# Patient Record
Sex: MALE | Race: OTHER | Employment: OTHER | URBAN - METROPOLITAN AREA
[De-identification: names, ages, dates, MRNs, and addresses within clinical notes are randomized per-mention and may not be internally consistent; named-entity substitution may affect disease eponyms.]

---

## 2019-08-02 ENCOUNTER — HOSPITAL ENCOUNTER (OUTPATIENT)
Dept: INFUSION THERAPY | Age: 64
Discharge: HOME | End: 2019-08-02
Payer: OTHER GOVERNMENT

## 2019-08-02 VITALS
DIASTOLIC BLOOD PRESSURE: 90 MMHG | SYSTOLIC BLOOD PRESSURE: 150 MMHG | TEMPERATURE: 97.9 F | HEART RATE: 88 BPM | RESPIRATION RATE: 16 BRPM

## 2019-08-02 PROCEDURE — 74011250636 HC RX REV CODE- 250/636: Performed by: NURSE PRACTITIONER

## 2019-08-02 PROCEDURE — 96365 THER/PROPH/DIAG IV INF INIT: CPT

## 2019-08-02 PROCEDURE — 96366 THER/PROPH/DIAG IV INF ADDON: CPT

## 2019-08-02 PROCEDURE — 74011250636 HC RX REV CODE- 250/636

## 2019-08-02 RX ORDER — ALLOPURINOL 100 MG/1
TABLET ORAL DAILY
COMMUNITY

## 2019-08-02 RX ORDER — ASPIRIN 81 MG/1
TABLET ORAL DAILY
COMMUNITY

## 2019-08-02 RX ORDER — SODIUM CHLORIDE 0.9 % (FLUSH) 0.9 %
5-10 SYRINGE (ML) INJECTION AS NEEDED
Status: DISCONTINUED | OUTPATIENT
Start: 2019-08-02 | End: 2019-08-03 | Stop reason: HOSPADM

## 2019-08-02 RX ORDER — HEPARIN 100 UNIT/ML
200 SYRINGE INTRAVENOUS AS NEEDED
Status: DISCONTINUED | OUTPATIENT
Start: 2019-08-02 | End: 2019-08-03 | Stop reason: HOSPADM

## 2019-08-02 RX ORDER — URSODIOL 300 MG/1
300 CAPSULE ORAL 2 TIMES DAILY
COMMUNITY
End: 2019-08-02

## 2019-08-02 RX ADMIN — Medication 10 ML: at 10:20

## 2019-08-02 RX ADMIN — Medication 10 ML: at 10:19

## 2019-08-02 RX ADMIN — VANCOMYCIN HYDROCHLORIDE 1750 MG: 1 INJECTION, POWDER, LYOPHILIZED, FOR SOLUTION INTRAVENOUS at 11:08

## 2019-08-02 RX ADMIN — Medication 200 UNITS: at 13:17

## 2019-08-02 NOTE — PROGRESS NOTES
[address] at [address]    8692 Patient arrives for Daily Antibiotics without acute problems. Please see connect care for complete assessment and education provided. All central lines follow the [facility]. Vital signs stable throughout and prior to discharge, Pt. Tolerated treatment well and discharged without incident. Patient is aware of next [facility] appointment on 8/2/2019. Medications Verified by Kely Nunes RN & Abigail Reyna RN RN via [employer]:  1. Vancomycin 1750mg  2.  Heparin flush X2    VITAL SIGNS   Patient Vitals for the past 12 hrs:   Temp Pulse Resp BP   08/02/19 1319 97.9 °F (36.6 °C) 88 16 150/90   08/02/19 1020 97.5 °F (36.4 °C) 87 16 141/90

## 2019-08-03 ENCOUNTER — HOSPITAL ENCOUNTER (OUTPATIENT)
Dept: INFUSION THERAPY | Age: 64
Discharge: HOME | End: 2019-08-03
Payer: OTHER GOVERNMENT

## 2019-08-03 VITALS
RESPIRATION RATE: 18 BRPM | HEART RATE: 76 BPM | DIASTOLIC BLOOD PRESSURE: 85 MMHG | SYSTOLIC BLOOD PRESSURE: 127 MMHG | TEMPERATURE: 97.6 F

## 2019-08-03 PROCEDURE — 74011250636 HC RX REV CODE- 250/636

## 2019-08-03 PROCEDURE — 74011250636 HC RX REV CODE- 250/636: Performed by: NURSE PRACTITIONER

## 2019-08-03 PROCEDURE — 96366 THER/PROPH/DIAG IV INF ADDON: CPT

## 2019-08-03 PROCEDURE — 96365 THER/PROPH/DIAG IV INF INIT: CPT

## 2019-08-03 RX ORDER — SODIUM CHLORIDE 0.9 % (FLUSH) 0.9 %
5-10 SYRINGE (ML) INJECTION AS NEEDED
Status: DISCONTINUED | OUTPATIENT
Start: 2019-08-03 | End: 2019-08-04 | Stop reason: HOSPADM

## 2019-08-03 RX ORDER — HEPARIN 100 UNIT/ML
500 SYRINGE INTRAVENOUS AS NEEDED
Status: DISCONTINUED | OUTPATIENT
Start: 2019-08-03 | End: 2019-08-04 | Stop reason: HOSPADM

## 2019-08-03 RX ADMIN — VANCOMYCIN HYDROCHLORIDE 1750 MG: 1 INJECTION, POWDER, LYOPHILIZED, FOR SOLUTION INTRAVENOUS at 08:30

## 2019-08-03 RX ADMIN — Medication 10 ML: at 08:29

## 2019-08-03 RX ADMIN — Medication 500 UNITS: at 10:33

## 2019-08-03 RX ADMIN — Medication 10 ML: at 10:33

## 2019-08-03 NOTE — PROGRESS NOTES
Outpatient Infusion Center Progress Note    0800 Pt admit to [facility] for daily antibiotics ambulatory in stable condition. Assessment completed. No new concerns voiced. Double lumen PICC CDI, flushed with positive blood return. Visit Vitals  /85   Pulse 76   Temp 97.6 °F (36.4 °C)   Resp 18       Medications:  Medications Administered     heparin (porcine) pf 500 Units     Admin Date  08/03/2019 Action  Given Dose  500 Units Route  IntraVENous Administered By  Lindsey Gil RN          sodium chloride (NS) flush 5-10 mL     Admin Date  08/03/2019 Action  Given Dose  10 mL Route  IntraVENous Administered By  Lindsey Gil RN           Admin Date  08/03/2019 Action  Given Dose  10 mL Route  IntraVENous Administered By  Lindsey Gil RN          vancomycin (VANCOCIN) 1,750 mg in 0.9% sodium chloride 500 mL IVPB     Admin Date  08/03/2019 Action  New Bag Dose  1750 mg Rate  250 mL/hr Route  IntraVENous Administered By  Lindsey Gil, GABRIEL                1030 Pt tolerated treatment well. D/c home ambulatory in no distress. Pt aware of next appointment scheduled for 8/4/19.

## 2019-08-04 ENCOUNTER — HOSPITAL ENCOUNTER (OUTPATIENT)
Dept: INFUSION THERAPY | Age: 64
Discharge: HOME | End: 2019-08-04
Payer: OTHER GOVERNMENT

## 2019-08-04 VITALS
RESPIRATION RATE: 18 BRPM | HEART RATE: 74 BPM | DIASTOLIC BLOOD PRESSURE: 87 MMHG | TEMPERATURE: 97.8 F | SYSTOLIC BLOOD PRESSURE: 127 MMHG

## 2019-08-04 PROCEDURE — 74011250636 HC RX REV CODE- 250/636: Performed by: INTERNAL MEDICINE

## 2019-08-04 PROCEDURE — 96365 THER/PROPH/DIAG IV INF INIT: CPT

## 2019-08-04 PROCEDURE — 96366 THER/PROPH/DIAG IV INF ADDON: CPT

## 2019-08-04 PROCEDURE — 74011250636 HC RX REV CODE- 250/636

## 2019-08-04 RX ORDER — HEPARIN 100 UNIT/ML
500 SYRINGE INTRAVENOUS AS NEEDED
Status: DISCONTINUED | OUTPATIENT
Start: 2019-08-04 | End: 2019-08-05 | Stop reason: HOSPADM

## 2019-08-04 RX ORDER — SODIUM CHLORIDE 0.9 % (FLUSH) 0.9 %
5-10 SYRINGE (ML) INJECTION AS NEEDED
Status: DISCONTINUED | OUTPATIENT
Start: 2019-08-04 | End: 2019-08-05 | Stop reason: HOSPADM

## 2019-08-04 RX ADMIN — Medication 10 ML: at 08:22

## 2019-08-04 RX ADMIN — Medication 10 ML: at 08:26

## 2019-08-04 RX ADMIN — VANCOMYCIN HYDROCHLORIDE 1750 MG: 1 INJECTION, POWDER, LYOPHILIZED, FOR SOLUTION INTRAVENOUS at 08:18

## 2019-08-04 RX ADMIN — Medication 500 UNITS: at 08:22

## 2019-08-04 RX ADMIN — Medication 500 UNITS: at 08:26

## 2019-08-04 NOTE — PROGRESS NOTES
[facility] Outpatient Infusion Center Note:  8894AL arrived at [facility] ambulatory and in no distress for daily antibiotic    Assessment stable, no new complaints voiced. Medications received:  Vancomycin     Tolerated treatment well, no adverse reaction noted. D/Cd from [facility] ambulatory and in no distress accompanied by self. Next appt 8/5  Visit Vitals  /87   Pulse 74   Temp 97.8 °F (36.6 °C)   Resp 18     No results found for this or any previous visit (from the past 12 hour(s)).

## 2019-08-05 ENCOUNTER — HOSPITAL ENCOUNTER (OUTPATIENT)
Dept: INFUSION THERAPY | Age: 64
Discharge: HOME | End: 2019-08-05
Payer: OTHER GOVERNMENT

## 2019-08-05 VITALS
TEMPERATURE: 98.1 F | SYSTOLIC BLOOD PRESSURE: 125 MMHG | DIASTOLIC BLOOD PRESSURE: 84 MMHG | RESPIRATION RATE: 18 BRPM | HEART RATE: 78 BPM

## 2019-08-05 LAB
ANION GAP SERPL CALC-SCNC: 9 MMOL/L (ref 5–15)
BASOPHILS # BLD: 0.1 K/UL (ref 0–0.1)
BASOPHILS NFR BLD: 1 % (ref 0–1)
BUN SERPL-MCNC: 21 MG/DL (ref 6–20)
BUN/CREAT SERPL: 14 (ref 12–20)
CALCIUM SERPL-MCNC: 9.2 MG/DL (ref 8.5–10.1)
CHLORIDE SERPL-SCNC: 108 MMOL/L (ref 97–108)
CO2 SERPL-SCNC: 26 MMOL/L (ref 21–32)
CREAT SERPL-MCNC: 1.54 MG/DL (ref 0.7–1.3)
DATE LAST DOSE: ABNORMAL
DIFFERENTIAL METHOD BLD: ABNORMAL
EOSINOPHIL # BLD: 0.4 K/UL (ref 0–0.4)
EOSINOPHIL NFR BLD: 4 % (ref 0–7)
ERYTHROCYTE [DISTWIDTH] IN BLOOD BY AUTOMATED COUNT: 13.7 % (ref 11.5–14.5)
GLUCOSE SERPL-MCNC: 64 MG/DL (ref 65–100)
HCT VFR BLD AUTO: 43 % (ref 36.6–50.3)
HGB BLD-MCNC: 13.6 G/DL (ref 12.1–17)
IMM GRANULOCYTES # BLD AUTO: 0.2 K/UL (ref 0–0.04)
IMM GRANULOCYTES NFR BLD AUTO: 2 % (ref 0–0.5)
LYMPHOCYTES # BLD: 1.9 K/UL (ref 0.8–3.5)
LYMPHOCYTES NFR BLD: 18 % (ref 12–49)
MCH RBC QN AUTO: 28 PG (ref 26–34)
MCHC RBC AUTO-ENTMCNC: 31.6 G/DL (ref 30–36.5)
MCV RBC AUTO: 88.5 FL (ref 80–99)
MONOCYTES # BLD: 1.1 K/UL (ref 0–1)
MONOCYTES NFR BLD: 11 % (ref 5–13)
NEUTS SEG # BLD: 6.8 K/UL (ref 1.8–8)
NEUTS SEG NFR BLD: 64 % (ref 32–75)
NRBC # BLD: 0 K/UL (ref 0–0.01)
NRBC BLD-RTO: 0 PER 100 WBC
PLATELET # BLD AUTO: 331 K/UL (ref 150–400)
PMV BLD AUTO: 10.2 FL (ref 8.9–12.9)
POTASSIUM SERPL-SCNC: 4.5 MMOL/L (ref 3.5–5.1)
RBC # BLD AUTO: 4.86 M/UL (ref 4.1–5.7)
REPORTED DOSE,DOSE: ABNORMAL UNITS
REPORTED DOSE/TIME,TMG: ABNORMAL
SODIUM SERPL-SCNC: 143 MMOL/L (ref 136–145)
VANCOMYCIN TROUGH SERPL-MCNC: 10.5 UG/ML (ref 5–10)
WBC # BLD AUTO: 10.3 K/UL (ref 4.1–11.1)

## 2019-08-05 PROCEDURE — 96365 THER/PROPH/DIAG IV INF INIT: CPT

## 2019-08-05 PROCEDURE — 80202 ASSAY OF VANCOMYCIN: CPT

## 2019-08-05 PROCEDURE — 85025 COMPLETE CBC W/AUTO DIFF WBC: CPT

## 2019-08-05 PROCEDURE — 74011000258 HC RX REV CODE- 258

## 2019-08-05 PROCEDURE — 74011250636 HC RX REV CODE- 250/636: Performed by: NURSE PRACTITIONER

## 2019-08-05 PROCEDURE — 74011250636 HC RX REV CODE- 250/636

## 2019-08-05 PROCEDURE — 96366 THER/PROPH/DIAG IV INF ADDON: CPT

## 2019-08-05 PROCEDURE — 36415 COLL VENOUS BLD VENIPUNCTURE: CPT

## 2019-08-05 PROCEDURE — 80048 BASIC METABOLIC PNL TOTAL CA: CPT

## 2019-08-05 RX ORDER — SODIUM CHLORIDE 0.9 % (FLUSH) 0.9 %
5-10 SYRINGE (ML) INJECTION AS NEEDED
Status: DISCONTINUED | OUTPATIENT
Start: 2019-08-05 | End: 2019-08-06 | Stop reason: HOSPADM

## 2019-08-05 RX ORDER — SODIUM CHLORIDE 9 MG/ML
25 INJECTION, SOLUTION INTRAVENOUS CONTINUOUS
Status: DISCONTINUED | OUTPATIENT
Start: 2019-08-05 | End: 2019-08-06 | Stop reason: HOSPADM

## 2019-08-05 RX ORDER — HEPARIN 100 UNIT/ML
500 SYRINGE INTRAVENOUS AS NEEDED
Status: DISCONTINUED | OUTPATIENT
Start: 2019-08-05 | End: 2019-08-06 | Stop reason: HOSPADM

## 2019-08-05 RX ADMIN — Medication 500 UNITS: at 15:25

## 2019-08-05 RX ADMIN — SODIUM CHLORIDE 25 ML/HR: 900 INJECTION, SOLUTION INTRAVENOUS at 15:25

## 2019-08-05 RX ADMIN — Medication 500 UNITS: at 18:04

## 2019-08-05 RX ADMIN — Medication 10 ML: at 18:04

## 2019-08-05 RX ADMIN — VANCOMYCIN HYDROCHLORIDE 1750 MG: 1 INJECTION, POWDER, LYOPHILIZED, FOR SOLUTION INTRAVENOUS at 15:59

## 2019-08-05 RX ADMIN — Medication 10 ML: at 15:25

## 2019-08-05 NOTE — PROGRESS NOTES
[facility] Short Note                       Date: 2019    Name: Misael Hart    MRN: [id number]         : 1955      1515 Pt admit to [facility] for daily Vancomycin. Pt ambulatory in stable condition. Assessment completed. No new concerns voiced. Please review further pending lab results in [address] [place] [address]. Mr. Hu Arellano vitals were reviewed prior to and after treatment. Patient Vitals for the past 12 hrs:   Temp Pulse Resp BP   19 1520 98.1 °F (36.7 °C) 78 18 125/84       Lab results were obtained and reviewed. Recent Results (from the past 12 hour(s))   CBC WITH AUTOMATED DIFF    Collection Time: 19  3:20 PM   Result Value Ref Range    WBC 10.3 4.1 - 11.1 K/uL    RBC 4.86 4.10 - 5.70 M/uL    HGB 13.6 12.1 - 17.0 g/dL    HCT 43.0 36.6 - 50.3 %    MCV 88.5 80.0 - 99.0 FL    MCH 28.0 26.0 - 34.0 PG    MCHC 31.6 30.0 - 36.5 g/dL    RDW 13.7 11.5 - 14.5 %    PLATELET 283 864 - 818 K/uL    MPV 10.2 8.9 - 12.9 FL    NRBC 0.0 0  WBC    ABSOLUTE NRBC 0.00 0.00 - 0.01 K/uL    NEUTROPHILS 64 32 - 75 %    LYMPHOCYTES 18 12 - 49 %    MONOCYTES 11 5 - 13 %    EOSINOPHILS 4 0 - 7 %    BASOPHILS 1 0 - 1 %    IMMATURE GRANULOCYTES 2 (H) 0.0 - 0.5 %    ABS. NEUTROPHILS 6.8 1.8 - 8.0 K/UL    ABS. LYMPHOCYTES 1.9 0.8 - 3.5 K/UL    ABS. MONOCYTES 1.1 (H) 0.0 - 1.0 K/UL    ABS. EOSINOPHILS 0.4 0.0 - 0.4 K/UL    ABS. BASOPHILS 0.1 0.0 - 0.1 K/UL    ABS. IMM.  GRANS. 0.2 (H) 0.00 - 0.04 K/UL    DF AUTOMATED         Medications given:   Medications Administered     0.9% sodium chloride infusion     Admin Date  2019 Action  New Bag Dose  25 mL/hr Rate  25 mL/hr Route  IntraVENous Administered By  Beth Shell RN          heparin (porcine) pf 500 Units     Admin Date  2019 Action  Given Dose  500 Units Route  IntraVENous Administered By  Beth Shell RN           Admin Date  2019 Action  Given Dose  500 Units Route  IntraVENous Administered By  Beth Shell RN          sodium chloride (NS) flush 5-10 mL     Admin Date  08/05/2019 Action  Given Dose  10 mL Route  IntraVENous Administered By  Neda Camarena RN           Admin Date  08/05/2019 Action  Given Dose  10 mL Route  IntraVENous Administered By  Neda Camarena RN          vancomycin (VANCOCIN) 1,750 mg in 0.9% sodium chloride 500 mL IVPB     Admin Date  08/05/2019 Action  New Bag Dose  1750 mg Rate  250 mL/hr Route  IntraVENous Administered By  Neda Camarena RN                PICC Line flushed, heparinized and capped with curos. Mr. Valarie Espinoza tolerated the infusion, and had no complaints. Mr. Valarie Espinoza was discharged from [place] [address] in stable condition at [address] [facility].      Future Appointments   Date Time Provider Scott Hartman   8/6/2019  3:00 PM BREMO FT CHAIR 1 [facility]   8/7/2019  3:00 PM BREMO FT CHAIR 2 [facility]   8/8/2019  3:00 PM BREMO INFUSION NURSE 3 [facility]   8/9/2019  3:00 PM BREMO INFUSION NURSE 4 [facility]   8/10/2019  8:00 AM BREMO INFUSION NURSE 4 [facility]   8/11/2019  8:00 AM BREMO INFUSION NURSE 4 [facility]   8/12/2019 11:30 AM BREMO FT CHAIR 2 [facility]   8/13/2019  3:00 PM BREMO FT CHAIR 2 [facility]   8/14/2019  3:00 PM BREMO FT CHAIR 2 [facility]   8/15/2019  3:00 PM BREMO FT CHAIR 2 [facility]   8/16/2019  3:00 PM BREMO FT CHAIR 2 [facility]   8/17/2019  8:00 AM BREMO FT CHAIR 2 [facility]   8/18/2019  8:00 AM BREMO FT CHAIR 2 [facility]   8/19/2019  3:00 PM BREMO FT CHAIR 2 Mick Laughlin RN  August 5, 2019  7:20 PM

## 2019-08-06 ENCOUNTER — HOSPITAL ENCOUNTER (OUTPATIENT)
Dept: INFUSION THERAPY | Age: 64
Discharge: HOME | End: 2019-08-06
Payer: OTHER GOVERNMENT

## 2019-08-06 VITALS
SYSTOLIC BLOOD PRESSURE: 126 MMHG | TEMPERATURE: 98.7 F | HEART RATE: 78 BPM | DIASTOLIC BLOOD PRESSURE: 87 MMHG | RESPIRATION RATE: 18 BRPM

## 2019-08-06 PROCEDURE — 74011250636 HC RX REV CODE- 250/636

## 2019-08-06 PROCEDURE — 96366 THER/PROPH/DIAG IV INF ADDON: CPT

## 2019-08-06 PROCEDURE — 74011250636 HC RX REV CODE- 250/636: Performed by: NURSE PRACTITIONER

## 2019-08-06 PROCEDURE — 96365 THER/PROPH/DIAG IV INF INIT: CPT

## 2019-08-06 RX ORDER — HEPARIN 100 UNIT/ML
500 SYRINGE INTRAVENOUS AS NEEDED
Status: DISCONTINUED | OUTPATIENT
Start: 2019-08-06 | End: 2019-08-07 | Stop reason: HOSPADM

## 2019-08-06 RX ORDER — SODIUM CHLORIDE 0.9 % (FLUSH) 0.9 %
5-10 SYRINGE (ML) INJECTION AS NEEDED
Status: DISCONTINUED | OUTPATIENT
Start: 2019-08-06 | End: 2019-08-07 | Stop reason: HOSPADM

## 2019-08-06 RX ADMIN — Medication 500 UNITS: at 15:00

## 2019-08-06 RX ADMIN — Medication 10 ML: at 15:00

## 2019-08-06 RX ADMIN — Medication 500 UNITS: at 18:40

## 2019-08-06 RX ADMIN — VANCOMYCIN HYDROCHLORIDE 1750 MG: 1 INJECTION, POWDER, LYOPHILIZED, FOR SOLUTION INTRAVENOUS at 16:06

## 2019-08-06 RX ADMIN — Medication 10 ML: at 18:40

## 2019-08-06 NOTE — PROGRESS NOTES
[facility] Outpatient Infusion Center Note:  1500Pt arrived at [facility] ambulatory and in no distress for daily antibiotic. Assessment stable, no new complaints voiced. Medications received:  *Vancomycin over 2 hours    1815 Tolerated treatment well, no adverse reaction noted. D/Cd from [facility] ambulatory and in no distress accompanied bywife. Next appt 8/7  1500    Blood pressure 126/87, pulse 78, temperature 98.7 °F (37.1 °C), resp. rate 18.

## 2019-08-07 ENCOUNTER — HOSPITAL ENCOUNTER (OUTPATIENT)
Dept: INFUSION THERAPY | Age: 64
Discharge: HOME | End: 2019-08-07
Payer: OTHER GOVERNMENT

## 2019-08-07 VITALS
DIASTOLIC BLOOD PRESSURE: 85 MMHG | HEART RATE: 79 BPM | TEMPERATURE: 98.1 F | RESPIRATION RATE: 18 BRPM | SYSTOLIC BLOOD PRESSURE: 131 MMHG

## 2019-08-07 PROCEDURE — 74011000250 HC RX REV CODE- 250

## 2019-08-07 PROCEDURE — 96375 TX/PRO/DX INJ NEW DRUG ADDON: CPT

## 2019-08-07 PROCEDURE — 96365 THER/PROPH/DIAG IV INF INIT: CPT

## 2019-08-07 PROCEDURE — 74011250636 HC RX REV CODE- 250/636: Performed by: NURSE PRACTITIONER

## 2019-08-07 PROCEDURE — 96366 THER/PROPH/DIAG IV INF ADDON: CPT

## 2019-08-07 PROCEDURE — 74011250636 HC RX REV CODE- 250/636

## 2019-08-07 PROCEDURE — 36593 DECLOT VASCULAR DEVICE: CPT

## 2019-08-07 RX ADMIN — ALTEPLASE 2 MG: 2.2 INJECTION, POWDER, LYOPHILIZED, FOR SOLUTION INTRAVENOUS at 16:13

## 2019-08-07 RX ADMIN — VANCOMYCIN HYDROCHLORIDE 1750 MG: 1 INJECTION, POWDER, LYOPHILIZED, FOR SOLUTION INTRAVENOUS at 16:14

## 2019-08-07 NOTE — PROGRESS NOTES
Outpatient Infusion Center Progress Note    7465 Pt admit to [facility] for daily antibiotics ambulatory in stable condition. Assessment completed. No new concerns voiced. Double lumen PICC CDI, flushed without positive blood return. Informed provider, Cathflow ordered and administered. PIV started with out difficulty in right hand and connected to IVF. Blood return noted 1 hour post cathflow. Visit Vitals  /85   Pulse 79   Temp 98.1 °F (36.7 °C)   Resp 18       Medications Administered     alteplase (CATHFLO) 2 mg in sterile water (preservative free) 2 mL injection     Admin Date  08/07/2019 Action  Given Dose  2 mg Route  InterCATHeter Administered By  Terry Rosales RN          vancomycin (VANCOCIN) 1,750 mg in 0.9% sodium chloride 500 mL IVPB     Admin Date  08/07/2019 Action  New Bag Dose  1750 mg Rate  250 mL/hr Route  IntraVENous Administered By  Terry Rosales, GABRIEL                8271 Pt tolerated treatment well. D/c home ambulatory in no distress. Pt aware of next appointment scheduled for 8/08/19.

## 2019-08-08 ENCOUNTER — HOSPITAL ENCOUNTER (OUTPATIENT)
Dept: INFUSION THERAPY | Age: 64
Discharge: HOME | End: 2019-08-08
Payer: OTHER GOVERNMENT

## 2019-08-08 VITALS
HEART RATE: 74 BPM | SYSTOLIC BLOOD PRESSURE: 125 MMHG | TEMPERATURE: 97.7 F | DIASTOLIC BLOOD PRESSURE: 78 MMHG | RESPIRATION RATE: 18 BRPM

## 2019-08-08 LAB
ANION GAP SERPL CALC-SCNC: 5 MMOL/L (ref 5–15)
BASOPHILS # BLD: 0.1 K/UL (ref 0–0.1)
BASOPHILS NFR BLD: 1 % (ref 0–1)
BUN SERPL-MCNC: 20 MG/DL (ref 6–20)
BUN/CREAT SERPL: 13 (ref 12–20)
CALCIUM SERPL-MCNC: 9.2 MG/DL (ref 8.5–10.1)
CHLORIDE SERPL-SCNC: 111 MMOL/L (ref 97–108)
CO2 SERPL-SCNC: 26 MMOL/L (ref 21–32)
CREAT SERPL-MCNC: 1.58 MG/DL (ref 0.7–1.3)
DATE LAST DOSE: ABNORMAL
DIFFERENTIAL METHOD BLD: ABNORMAL
EOSINOPHIL # BLD: 0.4 K/UL (ref 0–0.4)
EOSINOPHIL NFR BLD: 4 % (ref 0–7)
ERYTHROCYTE [DISTWIDTH] IN BLOOD BY AUTOMATED COUNT: 14.2 % (ref 11.5–14.5)
GLUCOSE SERPL-MCNC: 93 MG/DL (ref 65–100)
HCT VFR BLD AUTO: 41.6 % (ref 36.6–50.3)
HGB BLD-MCNC: 13.5 G/DL (ref 12.1–17)
IMM GRANULOCYTES # BLD AUTO: 0.1 K/UL (ref 0–0.04)
IMM GRANULOCYTES NFR BLD AUTO: 1 % (ref 0–0.5)
LYMPHOCYTES # BLD: 1.9 K/UL (ref 0.8–3.5)
LYMPHOCYTES NFR BLD: 19 % (ref 12–49)
MCH RBC QN AUTO: 29 PG (ref 26–34)
MCHC RBC AUTO-ENTMCNC: 32.5 G/DL (ref 30–36.5)
MCV RBC AUTO: 89.3 FL (ref 80–99)
MONOCYTES # BLD: 0.9 K/UL (ref 0–1)
MONOCYTES NFR BLD: 9 % (ref 5–13)
NEUTS SEG # BLD: 6.5 K/UL (ref 1.8–8)
NEUTS SEG NFR BLD: 66 % (ref 32–75)
NRBC # BLD: 0 K/UL (ref 0–0.01)
NRBC BLD-RTO: 0 PER 100 WBC
PLATELET # BLD AUTO: 303 K/UL (ref 150–400)
PMV BLD AUTO: 11.1 FL (ref 8.9–12.9)
POTASSIUM SERPL-SCNC: 4.5 MMOL/L (ref 3.5–5.1)
RBC # BLD AUTO: 4.66 M/UL (ref 4.1–5.7)
REPORTED DOSE,DOSE: ABNORMAL UNITS
REPORTED DOSE/TIME,TMG: ABNORMAL
SODIUM SERPL-SCNC: 142 MMOL/L (ref 136–145)
VANCOMYCIN TROUGH SERPL-MCNC: 13.2 UG/ML (ref 5–10)
WBC # BLD AUTO: 9.9 K/UL (ref 4.1–11.1)

## 2019-08-08 PROCEDURE — 80048 BASIC METABOLIC PNL TOTAL CA: CPT

## 2019-08-08 PROCEDURE — 80202 ASSAY OF VANCOMYCIN: CPT

## 2019-08-08 PROCEDURE — 36415 COLL VENOUS BLD VENIPUNCTURE: CPT

## 2019-08-08 PROCEDURE — 85025 COMPLETE CBC W/AUTO DIFF WBC: CPT

## 2019-08-08 PROCEDURE — 96365 THER/PROPH/DIAG IV INF INIT: CPT

## 2019-08-08 PROCEDURE — 74011250636 HC RX REV CODE- 250/636: Performed by: NURSE PRACTITIONER

## 2019-08-08 PROCEDURE — 96366 THER/PROPH/DIAG IV INF ADDON: CPT

## 2019-08-08 PROCEDURE — 36592 COLLECT BLOOD FROM PICC: CPT

## 2019-08-08 PROCEDURE — 77030020847 HC STATLOK BARD -A

## 2019-08-08 RX ADMIN — VANCOMYCIN HYDROCHLORIDE 1750 MG: 1 INJECTION, POWDER, LYOPHILIZED, FOR SOLUTION INTRAVENOUS at 15:14

## 2019-08-08 NOTE — PROGRESS NOTES
Outpatient Infusion Center Progress Note    1500 Pt admit to [facility] for Vancomycin ambulatory in stable condition. Assessment completed. No new concerns voiced. Visit Vitals  /78   Pulse 74   Temp 97.7 °F (36.5 °C)   Resp 18     Double lumen PICC flushed with positve blood return, both lumens, labs drawn per orders and sent. Dressing changed per protocol. Medications:  Vancomycin over 2 hours    1745 Pt tolerated treatment well. D/c home ambulatory in no distress. Pt aware of next appointment scheduled for 08/09/19. Recent Results (from the past 12 hour(s))   CBC WITH AUTOMATED DIFF    Collection Time: 08/08/19  3:03 PM   Result Value Ref Range    WBC 9.9 4.1 - 11.1 K/uL    RBC 4.66 4.10 - 5.70 M/uL    HGB 13.5 12.1 - 17.0 g/dL    HCT 41.6 36.6 - 50.3 %    MCV 89.3 80.0 - 99.0 FL    MCH 29.0 26.0 - 34.0 PG    MCHC 32.5 30.0 - 36.5 g/dL    RDW 14.2 11.5 - 14.5 %    PLATELET 492 472 - 814 K/uL    MPV 11.1 8.9 - 12.9 FL    NRBC 0.0 0  WBC    ABSOLUTE NRBC 0.00 0.00 - 0.01 K/uL    NEUTROPHILS 66 32 - 75 %    LYMPHOCYTES 19 12 - 49 %    MONOCYTES 9 5 - 13 %    EOSINOPHILS 4 0 - 7 %    BASOPHILS 1 0 - 1 %    IMMATURE GRANULOCYTES 1 (H) 0.0 - 0.5 %    ABS. NEUTROPHILS 6.5 1.8 - 8.0 K/UL    ABS. LYMPHOCYTES 1.9 0.8 - 3.5 K/UL    ABS. MONOCYTES 0.9 0.0 - 1.0 K/UL    ABS. EOSINOPHILS 0.4 0.0 - 0.4 K/UL    ABS. BASOPHILS 0.1 0.0 - 0.1 K/UL    ABS. IMM.  GRANS. 0.1 (H) 0.00 - 0.04 K/UL    DF AUTOMATED     METABOLIC PANEL, BASIC    Collection Time: 08/08/19  3:03 PM   Result Value Ref Range    Sodium 142 136 - 145 mmol/L    Potassium 4.5 3.5 - 5.1 mmol/L    Chloride 111 (H) 97 - 108 mmol/L    CO2 26 21 - 32 mmol/L    Anion gap 5 5 - 15 mmol/L    Glucose 93 65 - 100 mg/dL    BUN 20 6 - 20 MG/DL    Creatinine 1.58 (H) 0.70 - 1.30 MG/DL    BUN/Creatinine ratio 13 12 - 20      GFR est AA 54 (L) >60 ml/min/1.73m2    GFR est non-AA 45 (L) >60 ml/min/1.73m2    Calcium 9.2 8.5 - 10.1 MG/DL   VANCOMYCIN, TROUGH Collection Time: 08/08/19  3:03 PM   Result Value Ref Range    Vancomycin,trough 13.2 (H) 5.0 - 10.0 ug/mL    Reported dose date: NOT PROVIDED      Reported dose time: NOT PROVIDED      Reported dose: NOT PROVIDED UNITS

## 2019-08-09 ENCOUNTER — HOSPITAL ENCOUNTER (OUTPATIENT)
Dept: INFUSION THERAPY | Age: 64
Discharge: HOME | End: 2019-08-09
Payer: OTHER GOVERNMENT

## 2019-08-09 VITALS
SYSTOLIC BLOOD PRESSURE: 137 MMHG | TEMPERATURE: 97.9 F | RESPIRATION RATE: 18 BRPM | HEART RATE: 87 BPM | DIASTOLIC BLOOD PRESSURE: 82 MMHG

## 2019-08-09 PROCEDURE — 74011250636 HC RX REV CODE- 250/636: Performed by: NURSE PRACTITIONER

## 2019-08-09 PROCEDURE — 96366 THER/PROPH/DIAG IV INF ADDON: CPT

## 2019-08-09 PROCEDURE — 96365 THER/PROPH/DIAG IV INF INIT: CPT

## 2019-08-09 RX ORDER — SODIUM CHLORIDE 0.9 % (FLUSH) 0.9 %
10 SYRINGE (ML) INJECTION AS NEEDED
Status: DISCONTINUED | OUTPATIENT
Start: 2019-08-09 | End: 2019-08-13 | Stop reason: HOSPADM

## 2019-08-09 RX ORDER — HEPARIN 100 UNIT/ML
500 SYRINGE INTRAVENOUS AS NEEDED
Status: DISCONTINUED | OUTPATIENT
Start: 2019-08-09 | End: 2019-08-10 | Stop reason: HOSPADM

## 2019-08-09 RX ADMIN — VANCOMYCIN HYDROCHLORIDE 2000 MG: 1 INJECTION, POWDER, LYOPHILIZED, FOR SOLUTION INTRAVENOUS at 15:30

## 2019-08-09 RX ADMIN — Medication 10 ML: at 17:33

## 2019-08-09 RX ADMIN — Medication 10 ML: at 17:31

## 2019-08-09 RX ADMIN — Medication 500 UNITS: at 17:35

## 2019-08-09 RX ADMIN — Medication 10 ML: at 17:32

## 2019-08-09 RX ADMIN — Medication 10 ML: at 15:25

## 2019-08-09 RX ADMIN — Medication 500 UNITS: at 17:34

## 2019-08-09 NOTE — PROGRESS NOTES
Pharmacist Note - Vancomycin Dosing  Therapy day 8  Indication: necrotizing fasciitis   Current regimen: 1750 mg IV Q24H    A Trough Level resulted at 13.2 mcg/mL which was obtained 23 hrs post-dose. The extrapolated \"true\" trough is approximately 12.61 mcg/mL based on the patient's known kinetics. Goal trough: 15 - 20 mcg/mL     Plan: Change to 2000 mg IV Q24H for predicted trough ~ 15 mcg/mL . Pharmacy will continue to monitor this patient daily for changes in clinical status and renal function.     Patricia Umaña, KarlosD

## 2019-08-09 NOTE — PROGRESS NOTES
Outpatient Infusion Center Progress Note    4582 Pt admit to [facility] for Vancomycin ambulatory in stable condition. Assessment completed. No new concerns voiced. Double lumen PICC flushed with positve blood return. Blood pressure 137/82, pulse 87, temperature 97.9 °F (36.6 °C), resp. rate 18. Medications:  Vancomycin over 2 hours    1735 Pt tolerated treatment well. D/c home ambulatory in no distress. Pt aware of next appointment scheduled for 08/10/19.

## 2019-08-10 ENCOUNTER — HOSPITAL ENCOUNTER (OUTPATIENT)
Dept: INFUSION THERAPY | Age: 64
Discharge: HOME | End: 2019-08-10
Payer: OTHER GOVERNMENT

## 2019-08-10 VITALS
DIASTOLIC BLOOD PRESSURE: 87 MMHG | HEART RATE: 82 BPM | RESPIRATION RATE: 18 BRPM | SYSTOLIC BLOOD PRESSURE: 138 MMHG | TEMPERATURE: 99 F

## 2019-08-10 PROCEDURE — 96365 THER/PROPH/DIAG IV INF INIT: CPT

## 2019-08-10 PROCEDURE — 96366 THER/PROPH/DIAG IV INF ADDON: CPT

## 2019-08-10 PROCEDURE — 74011250636 HC RX REV CODE- 250/636: Performed by: NURSE PRACTITIONER

## 2019-08-10 RX ADMIN — VANCOMYCIN HYDROCHLORIDE 2000 MG: 1 INJECTION, POWDER, LYOPHILIZED, FOR SOLUTION INTRAVENOUS at 08:13

## 2019-08-11 ENCOUNTER — HOSPITAL ENCOUNTER (OUTPATIENT)
Dept: INFUSION THERAPY | Age: 64
Discharge: HOME | End: 2019-08-11
Payer: OTHER GOVERNMENT

## 2019-08-11 VITALS
TEMPERATURE: 98 F | SYSTOLIC BLOOD PRESSURE: 136 MMHG | HEART RATE: 81 BPM | DIASTOLIC BLOOD PRESSURE: 85 MMHG | RESPIRATION RATE: 16 BRPM

## 2019-08-11 PROCEDURE — 96366 THER/PROPH/DIAG IV INF ADDON: CPT

## 2019-08-11 PROCEDURE — 96365 THER/PROPH/DIAG IV INF INIT: CPT

## 2019-08-11 PROCEDURE — 74011250636 HC RX REV CODE- 250/636: Performed by: NURSE PRACTITIONER

## 2019-08-11 RX ADMIN — VANCOMYCIN HYDROCHLORIDE 2000 MG: 10 INJECTION, POWDER, LYOPHILIZED, FOR SOLUTION INTRAVENOUS at 08:45

## 2019-08-12 ENCOUNTER — HOSPITAL ENCOUNTER (OUTPATIENT)
Dept: INFUSION THERAPY | Age: 64
Discharge: HOME | End: 2019-08-12
Payer: OTHER GOVERNMENT

## 2019-08-12 VITALS
HEART RATE: 93 BPM | DIASTOLIC BLOOD PRESSURE: 86 MMHG | SYSTOLIC BLOOD PRESSURE: 139 MMHG | RESPIRATION RATE: 16 BRPM | TEMPERATURE: 97.9 F

## 2019-08-12 LAB
ANION GAP SERPL CALC-SCNC: 8 MMOL/L (ref 5–15)
BASOPHILS # BLD: 0.1 K/UL (ref 0–0.1)
BASOPHILS NFR BLD: 1 % (ref 0–1)
BUN SERPL-MCNC: 22 MG/DL (ref 6–20)
BUN/CREAT SERPL: 14 (ref 12–20)
CALCIUM SERPL-MCNC: 9.4 MG/DL (ref 8.5–10.1)
CHLORIDE SERPL-SCNC: 108 MMOL/L (ref 97–108)
CO2 SERPL-SCNC: 25 MMOL/L (ref 21–32)
CREAT SERPL-MCNC: 1.54 MG/DL (ref 0.7–1.3)
DATE LAST DOSE: ABNORMAL
DIFFERENTIAL METHOD BLD: ABNORMAL
EOSINOPHIL # BLD: 0.5 K/UL (ref 0–0.4)
EOSINOPHIL NFR BLD: 6 % (ref 0–7)
ERYTHROCYTE [DISTWIDTH] IN BLOOD BY AUTOMATED COUNT: 14.1 % (ref 11.5–14.5)
GLUCOSE SERPL-MCNC: 188 MG/DL (ref 65–100)
HCT VFR BLD AUTO: 41.9 % (ref 36.6–50.3)
HGB BLD-MCNC: 13.5 G/DL (ref 12.1–17)
IMM GRANULOCYTES # BLD AUTO: 0.1 K/UL (ref 0–0.04)
IMM GRANULOCYTES NFR BLD AUTO: 1 % (ref 0–0.5)
LYMPHOCYTES # BLD: 1.6 K/UL (ref 0.8–3.5)
LYMPHOCYTES NFR BLD: 18 % (ref 12–49)
MCH RBC QN AUTO: 28.4 PG (ref 26–34)
MCHC RBC AUTO-ENTMCNC: 32.2 G/DL (ref 30–36.5)
MCV RBC AUTO: 88 FL (ref 80–99)
MONOCYTES # BLD: 0.7 K/UL (ref 0–1)
MONOCYTES NFR BLD: 9 % (ref 5–13)
NEUTS SEG # BLD: 5.6 K/UL (ref 1.8–8)
NEUTS SEG NFR BLD: 65 % (ref 32–75)
NRBC # BLD: 0 K/UL (ref 0–0.01)
NRBC BLD-RTO: 0 PER 100 WBC
PLATELET # BLD AUTO: 297 K/UL (ref 150–400)
PMV BLD AUTO: 10.2 FL (ref 8.9–12.9)
POTASSIUM SERPL-SCNC: 3.8 MMOL/L (ref 3.5–5.1)
RBC # BLD AUTO: 4.76 M/UL (ref 4.1–5.7)
REPORTED DOSE,DOSE: ABNORMAL UNITS
REPORTED DOSE/TIME,TMG: ABNORMAL
SODIUM SERPL-SCNC: 141 MMOL/L (ref 136–145)
VANCOMYCIN TROUGH SERPL-MCNC: 13.3 UG/ML (ref 5–10)
WBC # BLD AUTO: 8.5 K/UL (ref 4.1–11.1)

## 2019-08-12 PROCEDURE — 80048 BASIC METABOLIC PNL TOTAL CA: CPT

## 2019-08-12 PROCEDURE — 36415 COLL VENOUS BLD VENIPUNCTURE: CPT

## 2019-08-12 PROCEDURE — 74011250636 HC RX REV CODE- 250/636: Performed by: NURSE PRACTITIONER

## 2019-08-12 PROCEDURE — 85025 COMPLETE CBC W/AUTO DIFF WBC: CPT

## 2019-08-12 PROCEDURE — 96366 THER/PROPH/DIAG IV INF ADDON: CPT

## 2019-08-12 PROCEDURE — 96365 THER/PROPH/DIAG IV INF INIT: CPT

## 2019-08-12 PROCEDURE — 80202 ASSAY OF VANCOMYCIN: CPT

## 2019-08-12 RX ADMIN — VANCOMYCIN HYDROCHLORIDE 2000 MG: 1 INJECTION, POWDER, LYOPHILIZED, FOR SOLUTION INTRAVENOUS at 11:56

## 2019-08-12 NOTE — PROGRESS NOTES
1135  Pt arrived ambulatory and in no distress for Vancomycin IV. Assessment completed. No new complaints voiced. Labs drawn per orders. Vital Signs  Patient Vitals for the past 12 hrs:   Temp Pulse Resp BP   08/12/19 1152 97.9 °F (36.6 °C) 93 16 139/86     Labs  Recent Results (from the past 12 hour(s))   VANCOMYCIN, TROUGH    Collection Time: 08/12/19 11:43 AM   Result Value Ref Range    Vancomycin,trough 13.3 (H) 5.0 - 10.0 ug/mL    Reported dose date: NOT PROVIDED      Reported dose time: NOT PROVIDED      Reported dose: NOT PROVIDED UNITS   CBC WITH AUTOMATED DIFF    Collection Time: 08/12/19 11:43 AM   Result Value Ref Range    WBC 8.5 4.1 - 11.1 K/uL    RBC 4.76 4.10 - 5.70 M/uL    HGB 13.5 12.1 - 17.0 g/dL    HCT 41.9 36.6 - 50.3 %    MCV 88.0 80.0 - 99.0 FL    MCH 28.4 26.0 - 34.0 PG    MCHC 32.2 30.0 - 36.5 g/dL    RDW 14.1 11.5 - 14.5 %    PLATELET 430 227 - 370 K/uL    MPV 10.2 8.9 - 12.9 FL    NRBC 0.0 0  WBC    ABSOLUTE NRBC 0.00 0.00 - 0.01 K/uL    NEUTROPHILS 65 32 - 75 %    LYMPHOCYTES 18 12 - 49 %    MONOCYTES 9 5 - 13 %    EOSINOPHILS 6 0 - 7 %    BASOPHILS 1 0 - 1 %    IMMATURE GRANULOCYTES 1 (H) 0.0 - 0.5 %    ABS. NEUTROPHILS 5.6 1.8 - 8.0 K/UL    ABS. LYMPHOCYTES 1.6 0.8 - 3.5 K/UL    ABS. MONOCYTES 0.7 0.0 - 1.0 K/UL    ABS. EOSINOPHILS 0.5 (H) 0.0 - 0.4 K/UL    ABS. BASOPHILS 0.1 0.0 - 0.1 K/UL    ABS. IMM.  GRANS. 0.1 (H) 0.00 - 0.04 K/UL    DF AUTOMATED     METABOLIC PANEL, BASIC    Collection Time: 08/12/19 11:43 AM   Result Value Ref Range    Sodium 141 136 - 145 mmol/L    Potassium 3.8 3.5 - 5.1 mmol/L    Chloride 108 97 - 108 mmol/L    CO2 25 21 - 32 mmol/L    Anion gap 8 5 - 15 mmol/L    Glucose 188 (H) 65 - 100 mg/dL    BUN 22 (H) 6 - 20 MG/DL    Creatinine 1.54 (H) 0.70 - 1.30 MG/DL    BUN/Creatinine ratio 14 12 - 20      GFR est AA 56 (L) >60 ml/min/1.73m2    GFR est non-AA 46 (L) >60 ml/min/1.73m2    Calcium 9.4 8.5 - 10.1 MG/DL       Medications:  Vancomycin 2g IV over 2 hours    1405:  Discharged home ambulatory and in no distress, accompanied by wife. Next appointment 8/12/19.

## 2019-08-13 ENCOUNTER — HOSPITAL ENCOUNTER (OUTPATIENT)
Dept: INFUSION THERAPY | Age: 64
Discharge: HOME | End: 2019-08-13
Payer: OTHER GOVERNMENT

## 2019-08-13 VITALS
RESPIRATION RATE: 16 BRPM | DIASTOLIC BLOOD PRESSURE: 79 MMHG | SYSTOLIC BLOOD PRESSURE: 127 MMHG | TEMPERATURE: 98 F | HEART RATE: 87 BPM

## 2019-08-13 PROCEDURE — 96366 THER/PROPH/DIAG IV INF ADDON: CPT

## 2019-08-13 PROCEDURE — 74011250636 HC RX REV CODE- 250/636: Performed by: NURSE PRACTITIONER

## 2019-08-13 PROCEDURE — 96365 THER/PROPH/DIAG IV INF INIT: CPT

## 2019-08-13 RX ADMIN — VANCOMYCIN HYDROCHLORIDE 2000 MG: 1 INJECTION, POWDER, LYOPHILIZED, FOR SOLUTION INTRAVENOUS at 15:59

## 2019-08-13 NOTE — PROGRESS NOTES
1550 Pt admit to [facility] for Vancomycin ambulatory in stable condition. Assessment completed. No new concerns voiced. PICC flushed with positive blood return. Visit Vitals  /79   Pulse 87   Temp 98 °F (36.7 °C)   Resp 16     Medications Administered     vancomycin (VANCOCIN) 2,000 mg in 0.9% sodium chloride 500 mL IVPB     Admin Date  08/13/2019 Action  New Bag Dose  2000 mg Rate  250 mL/hr Route  IntraVENous Administered By  Chelsea Encarnacion, RN                    4294 Pt tolerated treatment well. PICC maintained positive blood return throughout treatment, flushed with positive blood return at conclusion, heparinized and curos capes placed on end claves. D/c home ambulatory in no distress. Pt aware of next [facility] appointment scheduled for 8/14/19.

## 2019-08-13 NOTE — PROGRESS NOTES
Pharmacist Note - Vancomycin Dosing  Therapy day 12  Indication: Necrotizing fasciitis   Current regimen: 2000 mg IV Q24H    A Trough Level resulted at 13.3 mcg/mL which was obtained 27 hrs post-dose. The extrapolated \"true\" trough is approximately 15.19 mcg/mL based on the patient's known kinetics. Goal trough: 15 - 20 mcg/mL     Plan: Continue current regimen. Pharmacy will continue to monitor this patient daily for changes in clinical status and renal function.      Evangelina Jesus, KarlosD

## 2019-08-14 ENCOUNTER — HOSPITAL ENCOUNTER (OUTPATIENT)
Dept: INFUSION THERAPY | Age: 64
Discharge: HOME | End: 2019-08-14
Payer: OTHER GOVERNMENT

## 2019-08-14 VITALS
RESPIRATION RATE: 16 BRPM | TEMPERATURE: 97.3 F | HEART RATE: 79 BPM | SYSTOLIC BLOOD PRESSURE: 141 MMHG | DIASTOLIC BLOOD PRESSURE: 89 MMHG

## 2019-08-14 PROCEDURE — 74011250636 HC RX REV CODE- 250/636: Performed by: NURSE PRACTITIONER

## 2019-08-14 PROCEDURE — 96365 THER/PROPH/DIAG IV INF INIT: CPT

## 2019-08-14 PROCEDURE — 74011250636 HC RX REV CODE- 250/636

## 2019-08-14 PROCEDURE — 96366 THER/PROPH/DIAG IV INF ADDON: CPT

## 2019-08-14 RX ORDER — HEPARIN 100 UNIT/ML
500 SYRINGE INTRAVENOUS AS NEEDED
Status: DISCONTINUED | OUTPATIENT
Start: 2019-08-14 | End: 2019-08-15 | Stop reason: HOSPADM

## 2019-08-14 RX ORDER — SODIUM CHLORIDE 0.9 % (FLUSH) 0.9 %
5-10 SYRINGE (ML) INJECTION AS NEEDED
Status: DISCONTINUED | OUTPATIENT
Start: 2019-08-14 | End: 2019-08-15 | Stop reason: HOSPADM

## 2019-08-14 RX ADMIN — Medication 500 UNITS: at 18:29

## 2019-08-14 RX ADMIN — Medication 10 ML: at 18:30

## 2019-08-14 RX ADMIN — Medication 500 UNITS: at 18:30

## 2019-08-14 RX ADMIN — VANCOMYCIN HYDROCHLORIDE 2000 MG: 1 INJECTION, POWDER, LYOPHILIZED, FOR SOLUTION INTRAVENOUS at 16:18

## 2019-08-14 RX ADMIN — Medication 10 ML: at 18:29

## 2019-08-14 NOTE — PROGRESS NOTES
[facility] Short Note                       Date: 2019    Name: Reena Little    MRN: [id number]         : 1955      1545 Pt admit to [facility] for daily Vancomycin. Pt ambulatory in stable condition. Assessment completed. No new concerns voiced. Double lumen PICC flushes with positive blood return. Mr. Ruddy Gonzalez vitals were reviewed prior to and after treatment. Patient Vitals for the past 12 hrs:   Temp Pulse Resp BP   19 1548 97.3 °F (36.3 °C) 79 16 141/89     Medications given:   Medications Administered     heparin (porcine) pf 500 Units     Admin Date  2019 Action  Given Dose  500 Units Route  IntraVENous Administered By  Tashi Holm RN           Admin Date  2019 Action  Given Dose  500 Units Route  IntraVENous Administered By  Tashi Holm RN          sodium chloride (NS) flush 5-10 mL     Admin Date  2019 Action  Given Dose  10 mL Route  IntraVENous Administered By  Tashi Holm RN           Admin Date  2019 Action  Given Dose  10 mL Route  IntraVENous Administered By  Tashi Holm RN          vancomycin (VANCOCIN) 2,000 mg in 0.9% sodium chloride 500 mL IVPB     Admin Date  2019 Action  New Bag Dose  2000 mg Rate  250 mL/hr Route  IntraVENous Administered By  Tashi Holm RN                PICC flushed, heparinized and capped per protocol. Mr. Og Munson tolerated the infusion, and had no complaints. Mr. Og Munson was discharged from [place] [address] in stable condition at 31 75 62.      Future Appointments   Date Time Provider Scott Hartman   8/15/2019  3:00 PM [facility]   2019  3:00 PM BREMO FT CHAIR 2 [facility]   2019  8:00 AM [facility]   2019  8:00 AM BREMO FT CHAIR 2 [facility]   2019  3:00 PM BREMO FT CHAIR 2 [facility]   2019  3:00 PM BREMO FT CHAIR 2 [facility]   2019  4:00 PM BREMO FT CHAIR 2 [facility]   8/22/2019  3:00 PM TORIE FT CHAIR 2 [facility]   8/23/2019  3:00 PM TORIE FT CHAIR 2 [facility]   8/24/2019  9:00 AM TORIE FT CHAIR 2 [facility]   8/25/2019  8:00 AM TORIE FT CHAIR 2 [facility]   8/26/2019  3:00 PM TORIE FT CHAIR 2 Mick Laughlin RN  August 14, 2019  7:31 PM

## 2019-08-15 ENCOUNTER — HOSPITAL ENCOUNTER (OUTPATIENT)
Dept: INFUSION THERAPY | Age: 64
Discharge: HOME | End: 2019-08-15
Payer: OTHER GOVERNMENT

## 2019-08-15 VITALS
HEART RATE: 80 BPM | RESPIRATION RATE: 16 BRPM | DIASTOLIC BLOOD PRESSURE: 85 MMHG | SYSTOLIC BLOOD PRESSURE: 137 MMHG | TEMPERATURE: 97.1 F

## 2019-08-15 LAB
ANION GAP SERPL CALC-SCNC: 8 MMOL/L (ref 5–15)
BASOPHILS # BLD: 0.1 K/UL (ref 0–0.1)
BASOPHILS NFR BLD: 1 % (ref 0–1)
BUN SERPL-MCNC: 19 MG/DL (ref 6–20)
BUN/CREAT SERPL: 12 (ref 12–20)
CALCIUM SERPL-MCNC: 9 MG/DL (ref 8.5–10.1)
CHLORIDE SERPL-SCNC: 108 MMOL/L (ref 97–108)
CO2 SERPL-SCNC: 27 MMOL/L (ref 21–32)
CREAT SERPL-MCNC: 1.6 MG/DL (ref 0.7–1.3)
DATE LAST DOSE: ABNORMAL
DIFFERENTIAL METHOD BLD: ABNORMAL
EOSINOPHIL # BLD: 0.4 K/UL (ref 0–0.4)
EOSINOPHIL NFR BLD: 7 % (ref 0–7)
ERYTHROCYTE [DISTWIDTH] IN BLOOD BY AUTOMATED COUNT: 14.1 % (ref 11.5–14.5)
GLUCOSE SERPL-MCNC: 111 MG/DL (ref 65–100)
HCT VFR BLD AUTO: 40 % (ref 36.6–50.3)
HGB BLD-MCNC: 12.8 G/DL (ref 12.1–17)
IMM GRANULOCYTES # BLD AUTO: 0 K/UL (ref 0–0.04)
IMM GRANULOCYTES NFR BLD AUTO: 1 % (ref 0–0.5)
LYMPHOCYTES # BLD: 1.4 K/UL (ref 0.8–3.5)
LYMPHOCYTES NFR BLD: 21 % (ref 12–49)
MCH RBC QN AUTO: 28.4 PG (ref 26–34)
MCHC RBC AUTO-ENTMCNC: 32 G/DL (ref 30–36.5)
MCV RBC AUTO: 88.7 FL (ref 80–99)
MONOCYTES # BLD: 0.8 K/UL (ref 0–1)
MONOCYTES NFR BLD: 13 % (ref 5–13)
NEUTS SEG # BLD: 3.8 K/UL (ref 1.8–8)
NEUTS SEG NFR BLD: 57 % (ref 32–75)
NRBC # BLD: 0 K/UL (ref 0–0.01)
NRBC BLD-RTO: 0 PER 100 WBC
PLATELET # BLD AUTO: 264 K/UL (ref 150–400)
PMV BLD AUTO: 10.3 FL (ref 8.9–12.9)
POTASSIUM SERPL-SCNC: 4.2 MMOL/L (ref 3.5–5.1)
RBC # BLD AUTO: 4.51 M/UL (ref 4.1–5.7)
REPORTED DOSE,DOSE: ABNORMAL UNITS
REPORTED DOSE/TIME,TMG: ABNORMAL
SODIUM SERPL-SCNC: 143 MMOL/L (ref 136–145)
VANCOMYCIN TROUGH SERPL-MCNC: 14.6 UG/ML (ref 5–10)
WBC # BLD AUTO: 6.5 K/UL (ref 4.1–11.1)

## 2019-08-15 PROCEDURE — 80048 BASIC METABOLIC PNL TOTAL CA: CPT

## 2019-08-15 PROCEDURE — 96365 THER/PROPH/DIAG IV INF INIT: CPT

## 2019-08-15 PROCEDURE — 85025 COMPLETE CBC W/AUTO DIFF WBC: CPT

## 2019-08-15 PROCEDURE — 80202 ASSAY OF VANCOMYCIN: CPT

## 2019-08-15 PROCEDURE — 74011250636 HC RX REV CODE- 250/636: Performed by: NURSE PRACTITIONER

## 2019-08-15 PROCEDURE — 36415 COLL VENOUS BLD VENIPUNCTURE: CPT

## 2019-08-15 PROCEDURE — 96366 THER/PROPH/DIAG IV INF ADDON: CPT

## 2019-08-15 RX ADMIN — VANCOMYCIN HYDROCHLORIDE 2000 MG: 1 INJECTION, POWDER, LYOPHILIZED, FOR SOLUTION INTRAVENOUS at 16:30

## 2019-08-16 ENCOUNTER — HOSPITAL ENCOUNTER (OUTPATIENT)
Dept: INFUSION THERAPY | Age: 64
Discharge: HOME | End: 2019-08-16
Payer: OTHER GOVERNMENT

## 2019-08-16 VITALS
SYSTOLIC BLOOD PRESSURE: 138 MMHG | HEART RATE: 98 BPM | RESPIRATION RATE: 16 BRPM | TEMPERATURE: 97.6 F | DIASTOLIC BLOOD PRESSURE: 87 MMHG

## 2019-08-16 PROCEDURE — 96365 THER/PROPH/DIAG IV INF INIT: CPT

## 2019-08-16 PROCEDURE — 96366 THER/PROPH/DIAG IV INF ADDON: CPT

## 2019-08-16 PROCEDURE — 74011250636 HC RX REV CODE- 250/636: Performed by: NURSE PRACTITIONER

## 2019-08-16 RX ADMIN — VANCOMYCIN HYDROCHLORIDE 2000 MG: 1 INJECTION, POWDER, LYOPHILIZED, FOR SOLUTION INTRAVENOUS at 16:30

## 2019-08-16 NOTE — PROGRESS NOTES
[facility] Outpatient Infusion Center Note:  1500Pt arrived at [facility] ambulatory and in no distress for daily antibiotic. Assessment stable, no new complaints voiced. Labs drwn and dressing done    Medications received:  *Vacomycin    1900 Tolerated treatment well, no adverse reaction noted. D/Cd from [facility] ambulatory and in no distress accompanied by wife. Next appt 8/16  Visit Vitals  /85   Pulse 80   Temp 97.1 °F (36.2 °C)   Resp 16     Recent Results (from the past 12 hour(s))   VANCOMYCIN, TROUGH    Collection Time: 08/15/19  3:41 PM   Result Value Ref Range    Vancomycin,trough 14.6 (H) 5.0 - 10.0 ug/mL    Reported dose date: NOT PROVIDED      Reported dose time: NOT PROVIDED      Reported dose: NOT PROVIDED UNITS   METABOLIC PANEL, BASIC    Collection Time: 08/15/19  3:41 PM   Result Value Ref Range    Sodium 143 136 - 145 mmol/L    Potassium 4.2 3.5 - 5.1 mmol/L    Chloride 108 97 - 108 mmol/L    CO2 27 21 - 32 mmol/L    Anion gap 8 5 - 15 mmol/L    Glucose 111 (H) 65 - 100 mg/dL    BUN 19 6 - 20 MG/DL    Creatinine 1.60 (H) 0.70 - 1.30 MG/DL    BUN/Creatinine ratio 12 12 - 20      GFR est AA 53 (L) >60 ml/min/1.73m2    GFR est non-AA 44 (L) >60 ml/min/1.73m2    Calcium 9.0 8.5 - 10.1 MG/DL   CBC WITH AUTOMATED DIFF    Collection Time: 08/15/19  3:41 PM   Result Value Ref Range    WBC 6.5 4.1 - 11.1 K/uL    RBC 4.51 4.10 - 5.70 M/uL    HGB 12.8 12.1 - 17.0 g/dL    HCT 40.0 36.6 - 50.3 %    MCV 88.7 80.0 - 99.0 FL    MCH 28.4 26.0 - 34.0 PG    MCHC 32.0 30.0 - 36.5 g/dL    RDW 14.1 11.5 - 14.5 %    PLATELET 686 723 - 132 K/uL    MPV 10.3 8.9 - 12.9 FL    NRBC 0.0 0  WBC    ABSOLUTE NRBC 0.00 0.00 - 0.01 K/uL    NEUTROPHILS 57 32 - 75 %    LYMPHOCYTES 21 12 - 49 %    MONOCYTES 13 5 - 13 %    EOSINOPHILS 7 0 - 7 %    BASOPHILS 1 0 - 1 %    IMMATURE GRANULOCYTES 1 (H) 0.0 - 0.5 %    ABS. NEUTROPHILS 3.8 1.8 - 8.0 K/UL    ABS. LYMPHOCYTES 1.4 0.8 - 3.5 K/UL    ABS. MONOCYTES 0.8 0.0 - 1.0 K/UL    ABS. EOSINOPHILS 0.4 0.0 - 0.4 K/UL    ABS. BASOPHILS 0.1 0.0 - 0.1 K/UL    ABS. IMM.  GRANS. 0.0 0.00 - 0.04 K/UL    DF AUTOMATED

## 2019-08-16 NOTE — PROGRESS NOTES
5414 [facility] Pt admit to [facility] for Vancomycin ambulatory in stable condition. Assessment completed. No new concerns voiced. PICC flushed with positive blood return. Visit Vitals  /87   Pulse 98   Temp 97.6 °F (36.4 °C)   Resp 16     Medication:  Vancomycin       1845 Pt tolerated treatment well. PICC maintained positive blood return throughout treatment, flushed with positive blood return at conclusion, heparinized and curos capes placed on end claves. D/c home ambulatory in no distress. Pt aware of next [facility] appointment scheduled for 8/17/19.

## 2019-08-17 ENCOUNTER — HOSPITAL ENCOUNTER (OUTPATIENT)
Dept: INFUSION THERAPY | Age: 64
Discharge: HOME | End: 2019-08-17
Payer: OTHER GOVERNMENT

## 2019-08-17 VITALS
RESPIRATION RATE: 16 BRPM | HEART RATE: 74 BPM | DIASTOLIC BLOOD PRESSURE: 87 MMHG | SYSTOLIC BLOOD PRESSURE: 142 MMHG | TEMPERATURE: 97.4 F

## 2019-08-17 PROCEDURE — 96365 THER/PROPH/DIAG IV INF INIT: CPT

## 2019-08-17 PROCEDURE — 96366 THER/PROPH/DIAG IV INF ADDON: CPT

## 2019-08-17 PROCEDURE — 74011250636 HC RX REV CODE- 250/636

## 2019-08-17 PROCEDURE — 74011250636 HC RX REV CODE- 250/636: Performed by: NURSE PRACTITIONER

## 2019-08-17 RX ORDER — HEPARIN 100 UNIT/ML
500 SYRINGE INTRAVENOUS AS NEEDED
Status: DISCONTINUED | OUTPATIENT
Start: 2019-08-17 | End: 2019-08-18 | Stop reason: HOSPADM

## 2019-08-17 RX ORDER — SODIUM CHLORIDE 0.9 % (FLUSH) 0.9 %
5-10 SYRINGE (ML) INJECTION AS NEEDED
Status: DISCONTINUED | OUTPATIENT
Start: 2019-08-17 | End: 2019-08-18 | Stop reason: HOSPADM

## 2019-08-17 RX ADMIN — Medication 10 ML: at 10:16

## 2019-08-17 RX ADMIN — Medication 10 ML: at 10:15

## 2019-08-17 RX ADMIN — Medication 500 UNITS: at 10:16

## 2019-08-17 RX ADMIN — VANCOMYCIN HYDROCHLORIDE 2000 MG: 1 INJECTION, POWDER, LYOPHILIZED, FOR SOLUTION INTRAVENOUS at 08:11

## 2019-08-17 RX ADMIN — Medication 500 UNITS: at 10:15

## 2019-08-17 RX ADMIN — Medication 10 ML: at 08:08

## 2019-08-17 RX ADMIN — Medication 10 ML: at 08:09

## 2019-08-17 NOTE — PROGRESS NOTES
Outpatient Infusion Center Short Visit Progress Note    5166 Patient admitted to [facility] for Vancomycin ambulatory in stable condition. Assessment completed. No new concerns voiced. PICC line flushed with positive blood return. Vital Signs:  Visit Vitals  BP (!) 147/93 (BP 1 Location: Left arm, BP Patient Position: Sitting)   Pulse 85   Temp 97.6 °F (36.4 °C)   Resp 16     Patient Vitals for the past 12 hrs:   Temp Pulse Resp BP   08/17/19 1018 97.4 °F (36.3 °C) 74 16 142/87   08/17/19 0808 97.6 °F (36.4 °C) 85 16 (!) 147/93       Medications:  Medications Administered     heparin (porcine) pf 500 Units     Admin Date  08/17/2019 Action  Given Dose  500 Units Route  IntraVENous Administered By  Salty Monroe RN           Admin Date  08/17/2019 Action  Given Dose  500 Units Route  IntraVENous Administered By  Salty Monroe RN          sodium chloride (NS) flush 5-10 mL     Admin Date  08/17/2019 Action  Given Dose  10 mL Route  IntraVENous Administered By  Salty Monroe RN           Admin Date  08/17/2019 Action  Given Dose  10 mL Route  IntraVENous Administered By  Salty Monroe RN           Admin Date  08/17/2019 Action  Given Dose  10 mL Route  IntraVENous Administered By  Salty Monroe RN           Admin Date  08/17/2019 Action  Given Dose  10 mL Route  IntraVENous Administered By  Salty Monroe RN          vancomycin (VANCOCIN) 2,000 mg in 0.9% sodium chloride 500 mL IVPB     Admin Date  08/17/2019 Action  New Bag Dose  2000 mg Rate  250 mL/hr Route  IntraVENous Administered By  Salty Monroe RN              0200 Patient tolerated treatment well. PICC flushed with positive blood return, heparinized, and capped per protocol. Patient discharged from [place] [address] ambulatory in no distress at 1040. Patient aware of next appointment.     Future Appointments   Date Time Provider Scott Hartman   8/18/2019  8:00 AM [facility]    8/19/2019  3:00 PM [facility] FT CHAIR 2 [facility]  8/20/2019  3:00 PM BREMO FT CHAIR 2 [facility]   8/21/2019  4:00 PM BREMO FT CHAIR 2 [facility]   8/22/2019  3:00 PM BREMO FT CHAIR 2 [facility]   8/23/2019  3:00 PM BREMO FT CHAIR 2 [facility]   8/24/2019  9:00 AM BREMO FT CHAIR 2 [facility]   8/25/2019  8:00 AM BREMO FT CHAIR 2 [facility]   8/26/2019  3:00 PM BREMO FT CHAIR 2 [facility]   8/27/2019  3:00 PM BREMO FT CHAIR 2 [facility]   8/28/2019  4:00 PM BREMO FT CHAIR 2 [facility]   8/29/2019  3:00 PM BREMO FT CHAIR 2 [facility]   8/30/2019  3:00 PM BREMO FT CHAIR 2 [facility]   8/31/2019  9:00 AM BREMO FT CHAIR 2 [facility]   9/1/2019  9:00 AM BREMO FT CHAIR 2 [facility]   9/2/2019  9:00 AM BREMO FT CHAIR 2 [facility]   9/3/2019  3:00 PM BREMO FT CHAIR 2 [facility]   9/4/2019  4:00 PM BREMO FT CHAIR 2 [facility]   9/5/2019  3:00 PM BREMO FT CHAIR 2 [facility]   9/6/2019  3:00 PM BREMO FT CHAIR 2 [facility]   9/7/2019  3:00 PM BREMO FT CHAIR 2 [facility]   9/8/2019  3:00 PM BREMO FT CHAIR 2 [facility]

## 2019-08-18 ENCOUNTER — HOSPITAL ENCOUNTER (OUTPATIENT)
Dept: INFUSION THERAPY | Age: 64
Discharge: HOME | End: 2019-08-18
Payer: OTHER GOVERNMENT

## 2019-08-18 VITALS
SYSTOLIC BLOOD PRESSURE: 145 MMHG | HEART RATE: 74 BPM | RESPIRATION RATE: 18 BRPM | TEMPERATURE: 97.7 F | DIASTOLIC BLOOD PRESSURE: 92 MMHG

## 2019-08-18 PROCEDURE — 96365 THER/PROPH/DIAG IV INF INIT: CPT

## 2019-08-18 PROCEDURE — 74011250636 HC RX REV CODE- 250/636

## 2019-08-18 PROCEDURE — 74011250636 HC RX REV CODE- 250/636: Performed by: NURSE PRACTITIONER

## 2019-08-18 PROCEDURE — 96366 THER/PROPH/DIAG IV INF ADDON: CPT

## 2019-08-18 RX ORDER — HEPARIN 100 UNIT/ML
500 SYRINGE INTRAVENOUS AS NEEDED
Status: DISCONTINUED | OUTPATIENT
Start: 2019-08-18 | End: 2019-08-19 | Stop reason: HOSPADM

## 2019-08-18 RX ORDER — SODIUM CHLORIDE 0.9 % (FLUSH) 0.9 %
5-10 SYRINGE (ML) INJECTION AS NEEDED
Status: DISCONTINUED | OUTPATIENT
Start: 2019-08-18 | End: 2019-08-19 | Stop reason: HOSPADM

## 2019-08-18 RX ADMIN — Medication 10 ML: at 10:35

## 2019-08-18 RX ADMIN — Medication 500 UNITS: at 08:14

## 2019-08-18 RX ADMIN — VANCOMYCIN HYDROCHLORIDE 2000 MG: 1 INJECTION, POWDER, LYOPHILIZED, FOR SOLUTION INTRAVENOUS at 08:15

## 2019-08-18 RX ADMIN — Medication 500 UNITS: at 10:35

## 2019-08-18 RX ADMIN — Medication 10 ML: at 08:15

## 2019-08-18 RX ADMIN — Medication 10 ML: at 08:14

## 2019-08-18 NOTE — PROGRESS NOTES
[facility] Progress Note    Date: 2019    Name: Fly Mclaughlin    MRN: [id number]         : 1955    0810. Mr. Claudell Shock Arrived ambulatory and in no distress for Daily Antibitoics. Assessment was completed, no acute issues at this time, no new complaints voiced. RUE PICC with + blood return, dressing CDI. Mr. King Riva vitals were reviewed. Patient Vitals for the past 12 hrs:   Temp Pulse Resp BP   19 0812 97.7 °F (36.5 °C) 74 18 (!) 145/92       Medications:  Vancomycin IV    1035. Mr. Claudell Shock tolerated treatment well and was discharged from [place] [address] in stable condition. He is to return on 19 for his next appointment.     Vasu Payan RN  2019

## 2019-08-19 ENCOUNTER — HOSPITAL ENCOUNTER (OUTPATIENT)
Dept: INFUSION THERAPY | Age: 64
Discharge: HOME | End: 2019-08-19
Payer: OTHER GOVERNMENT

## 2019-08-19 LAB
ANION GAP SERPL CALC-SCNC: 8 MMOL/L (ref 5–15)
BASOPHILS # BLD: 0.1 K/UL (ref 0–0.1)
BASOPHILS NFR BLD: 1 % (ref 0–1)
BUN SERPL-MCNC: 18 MG/DL (ref 6–20)
BUN/CREAT SERPL: 10 (ref 12–20)
CALCIUM SERPL-MCNC: 8.9 MG/DL (ref 8.5–10.1)
CHLORIDE SERPL-SCNC: 106 MMOL/L (ref 97–108)
CO2 SERPL-SCNC: 26 MMOL/L (ref 21–32)
CREAT SERPL-MCNC: 1.81 MG/DL (ref 0.7–1.3)
DATE LAST DOSE: ABNORMAL
DIFFERENTIAL METHOD BLD: ABNORMAL
EOSINOPHIL # BLD: 0.5 K/UL (ref 0–0.4)
EOSINOPHIL NFR BLD: 7 % (ref 0–7)
ERYTHROCYTE [DISTWIDTH] IN BLOOD BY AUTOMATED COUNT: 14.1 % (ref 11.5–14.5)
GLUCOSE SERPL-MCNC: 104 MG/DL (ref 65–100)
HCT VFR BLD AUTO: 40.9 % (ref 36.6–50.3)
HGB BLD-MCNC: 13.4 G/DL (ref 12.1–17)
IMM GRANULOCYTES # BLD AUTO: 0.1 K/UL (ref 0–0.04)
IMM GRANULOCYTES NFR BLD AUTO: 1 % (ref 0–0.5)
LYMPHOCYTES # BLD: 1.9 K/UL (ref 0.8–3.5)
LYMPHOCYTES NFR BLD: 25 % (ref 12–49)
MCH RBC QN AUTO: 28.7 PG (ref 26–34)
MCHC RBC AUTO-ENTMCNC: 32.8 G/DL (ref 30–36.5)
MCV RBC AUTO: 87.6 FL (ref 80–99)
MONOCYTES # BLD: 1 K/UL (ref 0–1)
MONOCYTES NFR BLD: 13 % (ref 5–13)
NEUTS SEG # BLD: 4.1 K/UL (ref 1.8–8)
NEUTS SEG NFR BLD: 53 % (ref 32–75)
NRBC # BLD: 0 K/UL (ref 0–0.01)
NRBC BLD-RTO: 0 PER 100 WBC
PLATELET # BLD AUTO: 257 K/UL (ref 150–400)
PMV BLD AUTO: 10.7 FL (ref 8.9–12.9)
POTASSIUM SERPL-SCNC: 3.7 MMOL/L (ref 3.5–5.1)
RBC # BLD AUTO: 4.67 M/UL (ref 4.1–5.7)
REPORTED DOSE,DOSE: ABNORMAL UNITS
REPORTED DOSE/TIME,TMG: ABNORMAL
SODIUM SERPL-SCNC: 140 MMOL/L (ref 136–145)
VANCOMYCIN TROUGH SERPL-MCNC: 14 UG/ML (ref 5–10)
WBC # BLD AUTO: 7.7 K/UL (ref 4.1–11.1)

## 2019-08-19 PROCEDURE — 36415 COLL VENOUS BLD VENIPUNCTURE: CPT

## 2019-08-19 PROCEDURE — 80048 BASIC METABOLIC PNL TOTAL CA: CPT

## 2019-08-19 PROCEDURE — 85025 COMPLETE CBC W/AUTO DIFF WBC: CPT

## 2019-08-19 PROCEDURE — 80202 ASSAY OF VANCOMYCIN: CPT

## 2019-08-19 PROCEDURE — 74011250636 HC RX REV CODE- 250/636: Performed by: NURSE PRACTITIONER

## 2019-08-19 PROCEDURE — 96366 THER/PROPH/DIAG IV INF ADDON: CPT

## 2019-08-19 PROCEDURE — 96365 THER/PROPH/DIAG IV INF INIT: CPT

## 2019-08-19 RX ORDER — SODIUM CHLORIDE 9 MG/ML
10 INJECTION INTRAMUSCULAR; INTRAVENOUS; SUBCUTANEOUS AS NEEDED
Status: DISCONTINUED | OUTPATIENT
Start: 2019-08-19 | End: 2019-08-20 | Stop reason: HOSPADM

## 2019-08-19 RX ORDER — HEPARIN 100 UNIT/ML
500 SYRINGE INTRAVENOUS AS NEEDED
Status: DISCONTINUED | OUTPATIENT
Start: 2019-08-19 | End: 2019-08-20 | Stop reason: HOSPADM

## 2019-08-19 RX ADMIN — VANCOMYCIN HYDROCHLORIDE 2000 MG: 1 INJECTION, POWDER, LYOPHILIZED, FOR SOLUTION INTRAVENOUS at 15:54

## 2019-08-19 RX ADMIN — SODIUM CHLORIDE 10 ML: 9 INJECTION INTRAMUSCULAR; INTRAVENOUS; SUBCUTANEOUS at 18:13

## 2019-08-19 RX ADMIN — SODIUM CHLORIDE 10 ML: 9 INJECTION INTRAMUSCULAR; INTRAVENOUS; SUBCUTANEOUS at 15:17

## 2019-08-19 RX ADMIN — SODIUM CHLORIDE, PRESERVATIVE FREE 500 UNITS: 5 INJECTION INTRAVENOUS at 18:13

## 2019-08-19 RX ADMIN — SODIUM CHLORIDE, PRESERVATIVE FREE 500 UNITS: 5 INJECTION INTRAVENOUS at 15:17

## 2019-08-20 ENCOUNTER — HOSPITAL ENCOUNTER (OUTPATIENT)
Dept: INFUSION THERAPY | Age: 64
Discharge: HOME | End: 2019-08-20
Payer: OTHER GOVERNMENT

## 2019-08-20 VITALS
RESPIRATION RATE: 18 BRPM | DIASTOLIC BLOOD PRESSURE: 87 MMHG | TEMPERATURE: 97.1 F | HEART RATE: 94 BPM | SYSTOLIC BLOOD PRESSURE: 137 MMHG

## 2019-08-20 PROCEDURE — 96365 THER/PROPH/DIAG IV INF INIT: CPT

## 2019-08-20 PROCEDURE — 74011250636 HC RX REV CODE- 250/636: Performed by: NURSE PRACTITIONER

## 2019-08-20 PROCEDURE — 96366 THER/PROPH/DIAG IV INF ADDON: CPT

## 2019-08-20 RX ADMIN — VANCOMYCIN HYDROCHLORIDE 1750 MG: 1 INJECTION, POWDER, LYOPHILIZED, FOR SOLUTION INTRAVENOUS at 15:38

## 2019-08-20 NOTE — PROGRESS NOTES
hospitals Short Note                       Date: 2019    Name: Nash Huff    MRN: [id number]         : 1955      1505. Pt admit to [facility] for IV vancomycin ambulatory in stable condition. Assessment completed. No new concerns voiced. PICC line flushed with positive blood return. Mr. Diamond Kerr vitals were reviewed prior to treatment. Medications Administered     vancomycin (VANCOCIN) 1,750 mg in 0.9% sodium chloride 500 mL IVPB     Admin Date  2019 Action  New Bag Dose  1750 mg Rate  250 mL/hr Route  IntraVENous Administered By  Leslie Townsend RN              PICC line flushed and heparinized, and green capped. (NS flush, heparin flush)    Mr. Taylor Mckeon tolerated the infusion, and had no complaints.    1800. Mr. Taylor Mckeon was discharged from [place] [address] in stable condition.  .     Future Appointments   Date Time Provider Scott Hartman   2019  4:00 PM [facility]MO FT CHAIR 2 [facility]   2019  3:00 PM BREMO FT CHAIR 2 [facility]   2019  3:00 PM BREMO FT CHAIR 2 [facility]   2019  9:00 AM BREMO FT CHAIR 2 [facility]   2019  8:00 AM BREMO FT CHAIR 2 [facility]   2019  3:00 PM BREMO FT CHAIR 2 [facility]   2019  3:00 PM BREMO FT CHAIR 2 [facility]   2019  4:00 PM BREMO FT CHAIR 2 [facility]   2019  3:00 PM BREMO FT CHAIR 2 [facility]   2019  3:00 PM BREMO FT CHAIR 2 [facility]   2019  9:00 AM BREMO FT CHAIR 2 [facility]   2019  9:00 AM BREMO FT CHAIR 2 [facility]   2019  9:00 AM BREMO FT CHAIR 2 [facility]   9/3/2019  3:00 PM BREMO FT CHAIR 2 [facility]   2019  4:00 PM BREMO FT CHAIR 2 [facility]   2019  3:00 PM BREMO FT CHAIR 2 [facility]   2019  3:00 PM BREMO FT CHAIR 2 [facility]   2019  3:00 PM BREMO FT CHAIR 2 [address]   9/8/2019  3:00 PM TORIE BRASHER CHAIR Anthony Morales [address], RN  August 20, 2019

## 2019-08-20 NOTE — PROGRESS NOTES
Pharmacist Note - Vancomycin Dosing  Therapy day 19  Indication: necrotizing fasciitis   Current regimen: 2000 mg IV Q24H    A Trough Level resulted at 14 mcg/mL which was obtained 31 hrs post-dose. The extrapolated \"true\" trough is approximately 18.36 mcg/mL based on the patient's known kinetics. Goal trough: 15 - 20 mcg/mL     Plan: Change to 1750 mg IV Q24H for predicted trough 16 mcg/mL . Pharmacy will continue to monitor this patient daily for changes in clinical status and renal function.     Evangelina Jesus, KarlosD

## 2019-08-21 ENCOUNTER — HOSPITAL ENCOUNTER (OUTPATIENT)
Dept: INFUSION THERAPY | Age: 64
Discharge: HOME | End: 2019-08-21
Payer: OTHER GOVERNMENT

## 2019-08-21 VITALS
OXYGEN SATURATION: 95 % | HEART RATE: 87 BPM | DIASTOLIC BLOOD PRESSURE: 86 MMHG | SYSTOLIC BLOOD PRESSURE: 135 MMHG | TEMPERATURE: 97.1 F | RESPIRATION RATE: 16 BRPM

## 2019-08-21 PROCEDURE — 96366 THER/PROPH/DIAG IV INF ADDON: CPT

## 2019-08-21 PROCEDURE — 74011250636 HC RX REV CODE- 250/636: Performed by: NURSE PRACTITIONER

## 2019-08-21 PROCEDURE — 96365 THER/PROPH/DIAG IV INF INIT: CPT

## 2019-08-21 RX ADMIN — VANCOMYCIN HYDROCHLORIDE 1750 MG: 1 INJECTION, POWDER, LYOPHILIZED, FOR SOLUTION INTRAVENOUS at 17:00

## 2019-08-21 NOTE — PROGRESS NOTES
1605 Pt admit to [facility] for Vancomycin ambulatory in stable condition. Assessment completed. No new concerns voiced. PICC flushed with positive blood return. Patient Vitals for the past 12 hrs:   Temp Pulse Resp BP SpO2   08/21/19 1614 97.1 °F (36.2 °C) 87 16 135/86 95 %       Medication:  Vancomycin 1750 mg      1915 Pt tolerated treatment well. PICC maintained positive blood return throughout treatment, flushed with positive blood return at conclusion, heparinized and curos capes placed on end claves. D/c home ambulatory in no distress. Pt aware of next [facility] appointment scheduled for 8/22/19.

## 2019-08-22 ENCOUNTER — HOSPITAL ENCOUNTER (OUTPATIENT)
Dept: INFUSION THERAPY | Age: 64
Discharge: HOME | End: 2019-08-22
Payer: OTHER GOVERNMENT

## 2019-08-22 VITALS
RESPIRATION RATE: 16 BRPM | DIASTOLIC BLOOD PRESSURE: 85 MMHG | SYSTOLIC BLOOD PRESSURE: 141 MMHG | HEART RATE: 85 BPM | TEMPERATURE: 98 F

## 2019-08-22 LAB
ANION GAP SERPL CALC-SCNC: 8 MMOL/L (ref 5–15)
BASOPHILS # BLD: 0.1 K/UL (ref 0–0.1)
BASOPHILS NFR BLD: 1 % (ref 0–1)
BUN SERPL-MCNC: 19 MG/DL (ref 6–20)
BUN/CREAT SERPL: 12 (ref 12–20)
CALCIUM SERPL-MCNC: 9 MG/DL (ref 8.5–10.1)
CHLORIDE SERPL-SCNC: 107 MMOL/L (ref 97–108)
CO2 SERPL-SCNC: 27 MMOL/L (ref 21–32)
CREAT SERPL-MCNC: 1.59 MG/DL (ref 0.7–1.3)
DATE LAST DOSE: ABNORMAL
DIFFERENTIAL METHOD BLD: ABNORMAL
EOSINOPHIL # BLD: 0.5 K/UL (ref 0–0.4)
EOSINOPHIL NFR BLD: 7 % (ref 0–7)
ERYTHROCYTE [DISTWIDTH] IN BLOOD BY AUTOMATED COUNT: 14.4 % (ref 11.5–14.5)
GLUCOSE SERPL-MCNC: 84 MG/DL (ref 65–100)
HCT VFR BLD AUTO: 41 % (ref 36.6–50.3)
HGB BLD-MCNC: 13 G/DL (ref 12.1–17)
IMM GRANULOCYTES # BLD AUTO: 0 K/UL (ref 0–0.04)
IMM GRANULOCYTES NFR BLD AUTO: 1 % (ref 0–0.5)
LYMPHOCYTES # BLD: 1.7 K/UL (ref 0.8–3.5)
LYMPHOCYTES NFR BLD: 23 % (ref 12–49)
MCH RBC QN AUTO: 28.1 PG (ref 26–34)
MCHC RBC AUTO-ENTMCNC: 31.7 G/DL (ref 30–36.5)
MCV RBC AUTO: 88.6 FL (ref 80–99)
MONOCYTES # BLD: 0.9 K/UL (ref 0–1)
MONOCYTES NFR BLD: 13 % (ref 5–13)
NEUTS SEG # BLD: 4.2 K/UL (ref 1.8–8)
NEUTS SEG NFR BLD: 55 % (ref 32–75)
NRBC # BLD: 0 K/UL (ref 0–0.01)
NRBC BLD-RTO: 0 PER 100 WBC
PLATELET # BLD AUTO: 250 K/UL (ref 150–400)
PMV BLD AUTO: 10.4 FL (ref 8.9–12.9)
POTASSIUM SERPL-SCNC: 3.9 MMOL/L (ref 3.5–5.1)
RBC # BLD AUTO: 4.63 M/UL (ref 4.1–5.7)
REPORTED DOSE,DOSE: ABNORMAL UNITS
REPORTED DOSE/TIME,TMG: ABNORMAL
SODIUM SERPL-SCNC: 142 MMOL/L (ref 136–145)
VANCOMYCIN TROUGH SERPL-MCNC: 15.8 UG/ML (ref 5–10)
WBC # BLD AUTO: 7.4 K/UL (ref 4.1–11.1)

## 2019-08-22 PROCEDURE — 96366 THER/PROPH/DIAG IV INF ADDON: CPT

## 2019-08-22 PROCEDURE — 80048 BASIC METABOLIC PNL TOTAL CA: CPT

## 2019-08-22 PROCEDURE — 85025 COMPLETE CBC W/AUTO DIFF WBC: CPT

## 2019-08-22 PROCEDURE — 80202 ASSAY OF VANCOMYCIN: CPT

## 2019-08-22 PROCEDURE — 36592 COLLECT BLOOD FROM PICC: CPT

## 2019-08-22 PROCEDURE — 77030020847 HC STATLOK BARD -A

## 2019-08-22 PROCEDURE — 96365 THER/PROPH/DIAG IV INF INIT: CPT

## 2019-08-22 PROCEDURE — 36415 COLL VENOUS BLD VENIPUNCTURE: CPT

## 2019-08-22 PROCEDURE — 74011250636 HC RX REV CODE- 250/636: Performed by: NURSE PRACTITIONER

## 2019-08-22 RX ADMIN — VANCOMYCIN HYDROCHLORIDE 1750 MG: 1 INJECTION, POWDER, LYOPHILIZED, FOR SOLUTION INTRAVENOUS at 16:00

## 2019-08-22 NOTE — PROGRESS NOTES
Pt admit to [facility] for Vancomycin ambulatory in stable condition. Assessment completed. No new concerns voiced. PICC flushed with positive blood return, labs drawn and sent per orders. Dressing changed per protocol. Patient Vitals for the past 12 hrs:   Temp Pulse Resp BP   08/22/19 1913 98 °F (36.7 °C) 85 16 141/85       Medication:  Vancomycin 1750 mg      1900 Pt tolerated treatment well. PICC maintained positive blood return throughout treatment, flushed with positive blood return at conclusion, heparinized and curos capes placed on end claves. D/c home ambulatory in no distress. Pt aware of next [facility] appointment scheduled for 8/23/19. Recent Results (from the past 12 hour(s))   CBC WITH AUTOMATED DIFF    Collection Time: 08/22/19  3:55 PM   Result Value Ref Range    WBC 7.4 4.1 - 11.1 K/uL    RBC 4.63 4.10 - 5.70 M/uL    HGB 13.0 12.1 - 17.0 g/dL    HCT 41.0 36.6 - 50.3 %    MCV 88.6 80.0 - 99.0 FL    MCH 28.1 26.0 - 34.0 PG    MCHC 31.7 30.0 - 36.5 g/dL    RDW 14.4 11.5 - 14.5 %    PLATELET 319 754 - 265 K/uL    MPV 10.4 8.9 - 12.9 FL    NRBC 0.0 0  WBC    ABSOLUTE NRBC 0.00 0.00 - 0.01 K/uL    NEUTROPHILS 55 32 - 75 %    LYMPHOCYTES 23 12 - 49 %    MONOCYTES 13 5 - 13 %    EOSINOPHILS 7 0 - 7 %    BASOPHILS 1 0 - 1 %    IMMATURE GRANULOCYTES 1 (H) 0.0 - 0.5 %    ABS. NEUTROPHILS 4.2 1.8 - 8.0 K/UL    ABS. LYMPHOCYTES 1.7 0.8 - 3.5 K/UL    ABS. MONOCYTES 0.9 0.0 - 1.0 K/UL    ABS. EOSINOPHILS 0.5 (H) 0.0 - 0.4 K/UL    ABS. BASOPHILS 0.1 0.0 - 0.1 K/UL    ABS. IMM.  GRANS. 0.0 0.00 - 0.04 K/UL    DF AUTOMATED     METABOLIC PANEL, BASIC    Collection Time: 08/22/19  3:55 PM   Result Value Ref Range    Sodium 142 136 - 145 mmol/L    Potassium 3.9 3.5 - 5.1 mmol/L    Chloride 107 97 - 108 mmol/L    CO2 27 21 - 32 mmol/L    Anion gap 8 5 - 15 mmol/L    Glucose 84 65 - 100 mg/dL    BUN 19 6 - 20 MG/DL    Creatinine 1.59 (H) 0.70 - 1.30 MG/DL    BUN/Creatinine ratio 12 12 - 20      GFR est AA 54 (L) >60 ml/min/1.73m2    GFR est non-AA 44 (L) >60 ml/min/1.73m2    Calcium 9.0 8.5 - 10.1 MG/DL   VANCOMYCIN, TROUGH    Collection Time: 08/22/19  3:55 PM   Result Value Ref Range    Vancomycin,trough 15.8 (H) 5.0 - 10.0 ug/mL    Reported dose date: NOT PROVIDED      Reported dose time: NOT PROVIDED      Reported dose: NOT PROVIDED UNITS

## 2019-08-23 ENCOUNTER — HOSPITAL ENCOUNTER (OUTPATIENT)
Dept: INFUSION THERAPY | Age: 64
Discharge: HOME | End: 2019-08-23
Payer: OTHER GOVERNMENT

## 2019-08-23 VITALS
DIASTOLIC BLOOD PRESSURE: 90 MMHG | HEART RATE: 87 BPM | TEMPERATURE: 96.9 F | SYSTOLIC BLOOD PRESSURE: 141 MMHG | RESPIRATION RATE: 18 BRPM

## 2019-08-23 PROCEDURE — 96366 THER/PROPH/DIAG IV INF ADDON: CPT

## 2019-08-23 PROCEDURE — 74011250636 HC RX REV CODE- 250/636: Performed by: NURSE PRACTITIONER

## 2019-08-23 PROCEDURE — 96365 THER/PROPH/DIAG IV INF INIT: CPT

## 2019-08-23 RX ADMIN — VANCOMYCIN HYDROCHLORIDE 1750 MG: 1 INJECTION, POWDER, LYOPHILIZED, FOR SOLUTION INTRAVENOUS at 16:17

## 2019-08-23 NOTE — PROGRESS NOTES
[facility] Short Visit Note:    1500:  Pt arrived ambulatory and in no distress for VANCOMYCIN IV and tolerated well. PICC line with positive blood return. Flushed and capped. Patient left [facility] ambulatory and in no distress. Next visit 8/24.   Medications Administered     vancomycin (VANCOCIN) 1,750 mg in 0.9% sodium chloride 500 mL IVPB     Admin Date  08/23/2019 Action  New Bag Dose  1750 mg Rate  250 mL/hr Route  IntraVENous Administered By  Heidi Santiago RN              Visit Vitals  /90   Pulse 87   Temp 96.9 °F (36.1 °C)   Resp 18

## 2019-08-24 ENCOUNTER — HOSPITAL ENCOUNTER (OUTPATIENT)
Dept: INFUSION THERAPY | Age: 64
Discharge: HOME | End: 2019-08-24
Payer: OTHER GOVERNMENT

## 2019-08-24 VITALS
DIASTOLIC BLOOD PRESSURE: 84 MMHG | RESPIRATION RATE: 18 BRPM | TEMPERATURE: 96.8 F | HEART RATE: 86 BPM | SYSTOLIC BLOOD PRESSURE: 134 MMHG

## 2019-08-24 PROCEDURE — 96365 THER/PROPH/DIAG IV INF INIT: CPT

## 2019-08-24 PROCEDURE — 74011250636 HC RX REV CODE- 250/636: Performed by: NURSE PRACTITIONER

## 2019-08-24 PROCEDURE — 74011250636 HC RX REV CODE- 250/636

## 2019-08-24 PROCEDURE — 96366 THER/PROPH/DIAG IV INF ADDON: CPT

## 2019-08-24 RX ORDER — SODIUM CHLORIDE 0.9 % (FLUSH) 0.9 %
5-10 SYRINGE (ML) INJECTION AS NEEDED
Status: DISCONTINUED | OUTPATIENT
Start: 2019-08-24 | End: 2019-08-25 | Stop reason: HOSPADM

## 2019-08-24 RX ORDER — HEPARIN 100 UNIT/ML
500 SYRINGE INTRAVENOUS AS NEEDED
Status: DISCONTINUED | OUTPATIENT
Start: 2019-08-24 | End: 2019-08-25 | Stop reason: HOSPADM

## 2019-08-24 RX ADMIN — Medication 10 ML: at 09:20

## 2019-08-24 RX ADMIN — SODIUM CHLORIDE, PRESERVATIVE FREE 500 UNITS: 5 INJECTION INTRAVENOUS at 11:38

## 2019-08-24 RX ADMIN — VANCOMYCIN HYDROCHLORIDE 1750 MG: 1 INJECTION, POWDER, LYOPHILIZED, FOR SOLUTION INTRAVENOUS at 09:23

## 2019-08-24 RX ADMIN — SODIUM CHLORIDE, PRESERVATIVE FREE 500 UNITS: 5 INJECTION INTRAVENOUS at 09:20

## 2019-08-24 RX ADMIN — Medication 10 ML: at 11:38

## 2019-08-24 NOTE — PROGRESS NOTES
[facility] Progress Note    Date: 2019    Name: Renata Corona    MRN: [id number]         : 1955    0915. Mr. Stuart Hickey Arrived ambulatory and in no distress for Daily Antibitoics. Assessment was completed, no acute issues at this time, no new complaints voiced. RUE PICC with + blood return, dressing CDI. Mr. Yudelka Knott vitals were reviewed. Patient Vitals for the past 12 hrs:   Temp Pulse Resp BP   19 0921 96.8 °F (36 °C) 86 18 134/84       Medications:  Vancomycin IV    1040. Mr. Stuart Hickey tolerated treatment well and was discharged from [place] [address] in stable condition. He is to return on 19 for his next appointment.     Zarina Osborn RN  2019

## 2019-08-25 ENCOUNTER — HOSPITAL ENCOUNTER (OUTPATIENT)
Dept: INFUSION THERAPY | Age: 64
Discharge: HOME | End: 2019-08-25
Payer: OTHER GOVERNMENT

## 2019-08-25 VITALS — HEART RATE: 78 BPM | DIASTOLIC BLOOD PRESSURE: 87 MMHG | RESPIRATION RATE: 18 BRPM | SYSTOLIC BLOOD PRESSURE: 146 MMHG

## 2019-08-25 PROCEDURE — 74011250636 HC RX REV CODE- 250/636: Performed by: NURSE PRACTITIONER

## 2019-08-25 PROCEDURE — 96365 THER/PROPH/DIAG IV INF INIT: CPT

## 2019-08-25 PROCEDURE — 96366 THER/PROPH/DIAG IV INF ADDON: CPT

## 2019-08-25 RX ORDER — SODIUM CHLORIDE 0.9 % (FLUSH) 0.9 %
10 SYRINGE (ML) INJECTION AS NEEDED
Status: DISCONTINUED | OUTPATIENT
Start: 2019-08-25 | End: 2019-08-26 | Stop reason: HOSPADM

## 2019-08-25 RX ORDER — HEPARIN 100 UNIT/ML
500 SYRINGE INTRAVENOUS AS NEEDED
Status: DISCONTINUED | OUTPATIENT
Start: 2019-08-25 | End: 2019-08-26 | Stop reason: HOSPADM

## 2019-08-25 RX ADMIN — Medication 10 ML: at 09:17

## 2019-08-25 RX ADMIN — SODIUM CHLORIDE, PRESERVATIVE FREE 500 UNITS: 5 INJECTION INTRAVENOUS at 09:17

## 2019-08-25 RX ADMIN — VANCOMYCIN HYDROCHLORIDE 1750 MG: 10 INJECTION, POWDER, LYOPHILIZED, FOR SOLUTION INTRAVENOUS at 08:30

## 2019-08-26 ENCOUNTER — HOSPITAL ENCOUNTER (OUTPATIENT)
Dept: INFUSION THERAPY | Age: 64
Discharge: HOME | End: 2019-08-26
Payer: OTHER GOVERNMENT

## 2019-08-26 VITALS
TEMPERATURE: 97 F | DIASTOLIC BLOOD PRESSURE: 86 MMHG | HEART RATE: 85 BPM | RESPIRATION RATE: 16 BRPM | SYSTOLIC BLOOD PRESSURE: 133 MMHG

## 2019-08-26 LAB
ALBUMIN SERPL-MCNC: 3.6 G/DL (ref 3.5–5)
ALBUMIN/GLOB SERPL: 1 {RATIO} (ref 1.1–2.2)
ALP SERPL-CCNC: 66 U/L (ref 45–117)
ALT SERPL-CCNC: 34 U/L (ref 12–78)
ANION GAP SERPL CALC-SCNC: 9 MMOL/L (ref 5–15)
AST SERPL-CCNC: 23 U/L (ref 15–37)
BASOPHILS # BLD: 0.1 K/UL (ref 0–0.1)
BASOPHILS NFR BLD: 1 % (ref 0–1)
BILIRUB SERPL-MCNC: 0.4 MG/DL (ref 0.2–1)
BUN SERPL-MCNC: 19 MG/DL (ref 6–20)
BUN/CREAT SERPL: 12 (ref 12–20)
CALCIUM SERPL-MCNC: 8.9 MG/DL (ref 8.5–10.1)
CHLORIDE SERPL-SCNC: 110 MMOL/L (ref 97–108)
CO2 SERPL-SCNC: 24 MMOL/L (ref 21–32)
CREAT SERPL-MCNC: 1.62 MG/DL (ref 0.7–1.3)
DATE LAST DOSE: ABNORMAL
DIFFERENTIAL METHOD BLD: ABNORMAL
EOSINOPHIL # BLD: 0.5 K/UL (ref 0–0.4)
EOSINOPHIL NFR BLD: 5 % (ref 0–7)
ERYTHROCYTE [DISTWIDTH] IN BLOOD BY AUTOMATED COUNT: 14.3 % (ref 11.5–14.5)
GLOBULIN SER CALC-MCNC: 3.6 G/DL (ref 2–4)
GLUCOSE SERPL-MCNC: 102 MG/DL (ref 65–100)
HCT VFR BLD AUTO: 40.4 % (ref 36.6–50.3)
HGB BLD-MCNC: 13 G/DL (ref 12.1–17)
IMM GRANULOCYTES # BLD AUTO: 0 K/UL (ref 0–0.04)
IMM GRANULOCYTES NFR BLD AUTO: 1 % (ref 0–0.5)
LYMPHOCYTES # BLD: 1.7 K/UL (ref 0.8–3.5)
LYMPHOCYTES NFR BLD: 20 % (ref 12–49)
MCH RBC QN AUTO: 28.5 PG (ref 26–34)
MCHC RBC AUTO-ENTMCNC: 32.2 G/DL (ref 30–36.5)
MCV RBC AUTO: 88.6 FL (ref 80–99)
MONOCYTES # BLD: 1 K/UL (ref 0–1)
MONOCYTES NFR BLD: 12 % (ref 5–13)
NEUTS SEG # BLD: 5.1 K/UL (ref 1.8–8)
NEUTS SEG NFR BLD: 61 % (ref 32–75)
NRBC # BLD: 0 K/UL (ref 0–0.01)
NRBC BLD-RTO: 0 PER 100 WBC
PLATELET # BLD AUTO: 229 K/UL (ref 150–400)
PMV BLD AUTO: 10.1 FL (ref 8.9–12.9)
POTASSIUM SERPL-SCNC: 4.1 MMOL/L (ref 3.5–5.1)
PROT SERPL-MCNC: 7.2 G/DL (ref 6.4–8.2)
RBC # BLD AUTO: 4.56 M/UL (ref 4.1–5.7)
REPORTED DOSE,DOSE: ABNORMAL UNITS
REPORTED DOSE/TIME,TMG: ABNORMAL
SODIUM SERPL-SCNC: 143 MMOL/L (ref 136–145)
VANCOMYCIN TROUGH SERPL-MCNC: 31.3 UG/ML (ref 5–10)
WBC # BLD AUTO: 8.3 K/UL (ref 4.1–11.1)

## 2019-08-26 PROCEDURE — 36415 COLL VENOUS BLD VENIPUNCTURE: CPT

## 2019-08-26 PROCEDURE — 80053 COMPREHEN METABOLIC PANEL: CPT

## 2019-08-26 PROCEDURE — 85025 COMPLETE CBC W/AUTO DIFF WBC: CPT

## 2019-08-26 PROCEDURE — 74011250636 HC RX REV CODE- 250/636: Performed by: NURSE PRACTITIONER

## 2019-08-26 PROCEDURE — 80202 ASSAY OF VANCOMYCIN: CPT

## 2019-08-26 PROCEDURE — 96365 THER/PROPH/DIAG IV INF INIT: CPT

## 2019-08-26 PROCEDURE — 96366 THER/PROPH/DIAG IV INF ADDON: CPT

## 2019-08-26 RX ADMIN — VANCOMYCIN HYDROCHLORIDE 1750 MG: 1 INJECTION, POWDER, LYOPHILIZED, FOR SOLUTION INTRAVENOUS at 15:33

## 2019-08-26 NOTE — PROGRESS NOTES
1500 Pt admit to [facility] for Vancomycin ambulatory in stable condition. Assessment completed. No new concerns voiced. PICC flushed with positive blood return, labs drawn and sent per orders. Patient Vitals for the past 12 hrs:   Temp Pulse Resp BP   08/26/19 1521 97 °F (36.1 °C) 85 16 133/86       Medication:  Vancomycin 1750 mg    Critical Vanc trough value of 31.3 called in from the lab. IV room attempted to reach several times with no answer. [facility] pharmacist called to inform of value, she will adjust dose accordingly. 1800 Pt tolerated treatment well. PICC maintained positive blood return throughout treatment, flushed with positive blood return at conclusion, heparinized and curos capes placed on end claves. D/c home ambulatory in no distress. Pt aware of next [facility] appointment scheduled for 8/27/19. Recent Results (from the past 12 hour(s))   METABOLIC PANEL, COMPREHENSIVE    Collection Time: 08/26/19  3:40 PM   Result Value Ref Range    Sodium 143 136 - 145 mmol/L    Potassium 4.1 3.5 - 5.1 mmol/L    Chloride 110 (H) 97 - 108 mmol/L    CO2 24 21 - 32 mmol/L    Anion gap 9 5 - 15 mmol/L    Glucose 102 (H) 65 - 100 mg/dL    BUN 19 6 - 20 MG/DL    Creatinine 1.62 (H) 0.70 - 1.30 MG/DL    BUN/Creatinine ratio 12 12 - 20      GFR est AA 53 (L) >60 ml/min/1.73m2    GFR est non-AA 43 (L) >60 ml/min/1.73m2    Calcium 8.9 8.5 - 10.1 MG/DL    Bilirubin, total 0.4 0.2 - 1.0 MG/DL    ALT (SGPT) 34 12 - 78 U/L    AST (SGOT) 23 15 - 37 U/L    Alk.  phosphatase 66 45 - 117 U/L    Protein, total 7.2 6.4 - 8.2 g/dL    Albumin 3.6 3.5 - 5.0 g/dL    Globulin 3.6 2.0 - 4.0 g/dL    A-G Ratio 1.0 (L) 1.1 - 2.2     CBC WITH AUTOMATED DIFF    Collection Time: 08/26/19  3:40 PM   Result Value Ref Range    WBC 8.3 4.1 - 11.1 K/uL    RBC 4.56 4.10 - 5.70 M/uL    HGB 13.0 12.1 - 17.0 g/dL    HCT 40.4 36.6 - 50.3 %    MCV 88.6 80.0 - 99.0 FL    MCH 28.5 26.0 - 34.0 PG    MCHC 32.2 30.0 - 36.5 g/dL    RDW 14.3 11.5 - 14.5 % PLATELET 916 029 - 185 K/uL    MPV 10.1 8.9 - 12.9 FL    NRBC 0.0 0  WBC    ABSOLUTE NRBC 0.00 0.00 - 0.01 K/uL    NEUTROPHILS 61 32 - 75 %    LYMPHOCYTES 20 12 - 49 %    MONOCYTES 12 5 - 13 %    EOSINOPHILS 5 0 - 7 %    BASOPHILS 1 0 - 1 %    IMMATURE GRANULOCYTES 1 (H) 0.0 - 0.5 %    ABS. NEUTROPHILS 5.1 1.8 - 8.0 K/UL    ABS. LYMPHOCYTES 1.7 0.8 - 3.5 K/UL    ABS. MONOCYTES 1.0 0.0 - 1.0 K/UL    ABS. EOSINOPHILS 0.5 (H) 0.0 - 0.4 K/UL    ABS. BASOPHILS 0.1 0.0 - 0.1 K/UL    ABS. IMM.  GRANS. 0.0 0.00 - 0.04 K/UL    DF AUTOMATED

## 2019-08-27 ENCOUNTER — HOSPITAL ENCOUNTER (OUTPATIENT)
Dept: INFUSION THERAPY | Age: 64
Discharge: HOME | End: 2019-08-27
Payer: OTHER GOVERNMENT

## 2019-08-27 VITALS
SYSTOLIC BLOOD PRESSURE: 135 MMHG | HEART RATE: 86 BPM | RESPIRATION RATE: 18 BRPM | DIASTOLIC BLOOD PRESSURE: 84 MMHG | TEMPERATURE: 97 F

## 2019-08-27 LAB
DATE LAST DOSE: ABNORMAL
REPORTED DOSE,DOSE: ABNORMAL UNITS
REPORTED DOSE/TIME,TMG: ABNORMAL
VANCOMYCIN TROUGH SERPL-MCNC: 12.5 UG/ML (ref 5–10)

## 2019-08-27 PROCEDURE — 36415 COLL VENOUS BLD VENIPUNCTURE: CPT

## 2019-08-27 PROCEDURE — 80202 ASSAY OF VANCOMYCIN: CPT

## 2019-08-27 PROCEDURE — 74011250636 HC RX REV CODE- 250/636

## 2019-08-27 PROCEDURE — 96365 THER/PROPH/DIAG IV INF INIT: CPT

## 2019-08-27 PROCEDURE — 96366 THER/PROPH/DIAG IV INF ADDON: CPT

## 2019-08-27 RX ORDER — SODIUM CHLORIDE 0.9 % (FLUSH) 0.9 %
5-10 SYRINGE (ML) INJECTION AS NEEDED
Status: DISCONTINUED | OUTPATIENT
Start: 2019-08-27 | End: 2019-08-28 | Stop reason: HOSPADM

## 2019-08-27 RX ORDER — HEPARIN 100 UNIT/ML
500 SYRINGE INTRAVENOUS AS NEEDED
Status: DISCONTINUED | OUTPATIENT
Start: 2019-08-27 | End: 2019-08-28 | Stop reason: HOSPADM

## 2019-08-27 RX ADMIN — Medication 500 UNITS: at 17:26

## 2019-08-27 RX ADMIN — VANCOMYCIN HYDROCHLORIDE 2000 MG: 1 INJECTION, POWDER, LYOPHILIZED, FOR SOLUTION INTRAVENOUS at 17:26

## 2019-08-27 RX ADMIN — Medication 10 ML: at 17:24

## 2019-08-27 RX ADMIN — Medication 10 ML: at 17:26

## 2019-08-27 RX ADMIN — Medication 500 UNITS: at 17:24

## 2019-08-27 NOTE — PROGRESS NOTES
Pharmacist Note - Vancomycin Dosing  Therapy day 25  Indication: necrotizing fasciitis   Current regimen: 1750 mg IV Q24H    A Trough Level resulted at 31.3 mcg/mL which was obtained ~31 hrs post-dose. The extrapolated \"true\" trough is approximately 36.28 mcg/mL based on the patient's known kinetics. Confirmed trough drawn prior to hanging dose with patient's nurse. Goal trough: 15 - 20 mcg/mL     Plan: Discussed trough level with Cachorro Rosario. Plan to order another trough prior to dose 8/27/19. Will hold dose x 1 if trough is still elevated. Pharmacy will continue to monitor this patient daily for changes in clinical status and renal function.      Deb Martell, KarlosD

## 2019-08-27 NOTE — PROGRESS NOTES
[facility] Out[facility]fusion [facility] Note:  1500Pt arrived at [facility] ambulatory and in no distress for daily antibiotic and had trough repeated. Assessment stable no new complaints voiced. Medications received:  vancomycin     Tolerated treatment well, no adverse reaction noted. D/Cd from [facility] ambulatory and in no distress accompanied by self.   Next appt 8/28  Visit Vitals  /84   Pulse 86   Temp 97 °F (36.1 °C)   Resp 18     Recent Results (from the past 12 hour(s))   VANCOMYCIN, TROUGH    Collection Time: 08/27/19  3:15 PM   Result Value Ref Range    Vancomycin,trough 12.5 (H) 5.0 - 10.0 ug/mL    Reported dose date: NOT PROVIDED      Reported dose time: NOT PROVIDED      Reported dose: NOT PROVIDED UNITS

## 2019-08-28 ENCOUNTER — HOSPITAL ENCOUNTER (OUTPATIENT)
Dept: INFUSION THERAPY | Age: 64
Discharge: HOME | End: 2019-08-28
Payer: OTHER GOVERNMENT

## 2019-08-28 VITALS
TEMPERATURE: 96.9 F | SYSTOLIC BLOOD PRESSURE: 137 MMHG | RESPIRATION RATE: 18 BRPM | HEART RATE: 88 BPM | DIASTOLIC BLOOD PRESSURE: 88 MMHG

## 2019-08-28 PROCEDURE — 96365 THER/PROPH/DIAG IV INF INIT: CPT

## 2019-08-28 PROCEDURE — 74011250636 HC RX REV CODE- 250/636

## 2019-08-28 PROCEDURE — 96366 THER/PROPH/DIAG IV INF ADDON: CPT

## 2019-08-28 RX ORDER — HEPARIN 100 UNIT/ML
500 SYRINGE INTRAVENOUS AS NEEDED
Status: DISCONTINUED | OUTPATIENT
Start: 2019-08-28 | End: 2019-08-29 | Stop reason: HOSPADM

## 2019-08-28 RX ORDER — SODIUM CHLORIDE 0.9 % (FLUSH) 0.9 %
5-10 SYRINGE (ML) INJECTION AS NEEDED
Status: DISCONTINUED | OUTPATIENT
Start: 2019-08-28 | End: 2019-08-29 | Stop reason: HOSPADM

## 2019-08-28 RX ADMIN — Medication 500 UNITS: at 15:54

## 2019-08-28 RX ADMIN — VANCOMYCIN HYDROCHLORIDE 2000 MG: 1 INJECTION, POWDER, LYOPHILIZED, FOR SOLUTION INTRAVENOUS at 16:12

## 2019-08-28 RX ADMIN — Medication 10 ML: at 15:53

## 2019-08-28 NOTE — PROGRESS NOTES
[facility] Outpatient Infusion Center Note:  1430Pt arrived at [facility] ambulatory and in no distress for Daily antibiotic. Assessment stable, no new complaints voiced. Medications received:  *Vancomycin  Total volume is 550 ml.    1815 Tolerated treatment well, no adverse reaction noted. D/Cd from [facility] ambulatory and in no distress accompanied by self. Next appt 8/29  Visit Vitals  /88   Pulse 88   Temp 96.9 °F (36.1 °C)   Resp 18     No results found for this or any previous visit (from the past 12 hour(s)).

## 2019-08-29 ENCOUNTER — HOSPITAL ENCOUNTER (OUTPATIENT)
Dept: INFUSION THERAPY | Age: 64
Discharge: HOME | End: 2019-08-29
Payer: OTHER GOVERNMENT

## 2019-08-29 VITALS
HEART RATE: 84 BPM | TEMPERATURE: 97.1 F | DIASTOLIC BLOOD PRESSURE: 86 MMHG | SYSTOLIC BLOOD PRESSURE: 136 MMHG | RESPIRATION RATE: 18 BRPM

## 2019-08-29 LAB
ANION GAP SERPL CALC-SCNC: 8 MMOL/L (ref 5–15)
BASOPHILS # BLD: 0.1 K/UL (ref 0–0.1)
BASOPHILS NFR BLD: 1 % (ref 0–1)
BUN SERPL-MCNC: 20 MG/DL (ref 6–20)
BUN/CREAT SERPL: 13 (ref 12–20)
CALCIUM SERPL-MCNC: 9.1 MG/DL (ref 8.5–10.1)
CHLORIDE SERPL-SCNC: 108 MMOL/L (ref 97–108)
CO2 SERPL-SCNC: 26 MMOL/L (ref 21–32)
CREAT SERPL-MCNC: 1.53 MG/DL (ref 0.7–1.3)
DATE LAST DOSE: ABNORMAL
DIFFERENTIAL METHOD BLD: ABNORMAL
EOSINOPHIL # BLD: 0.3 K/UL (ref 0–0.4)
EOSINOPHIL NFR BLD: 4 % (ref 0–7)
ERYTHROCYTE [DISTWIDTH] IN BLOOD BY AUTOMATED COUNT: 14.5 % (ref 11.5–14.5)
GLUCOSE SERPL-MCNC: 83 MG/DL (ref 65–100)
HCT VFR BLD AUTO: 40.2 % (ref 36.6–50.3)
HGB BLD-MCNC: 12.8 G/DL (ref 12.1–17)
IMM GRANULOCYTES # BLD AUTO: 0 K/UL (ref 0–0.04)
IMM GRANULOCYTES NFR BLD AUTO: 1 % (ref 0–0.5)
LYMPHOCYTES # BLD: 1.4 K/UL (ref 0.8–3.5)
LYMPHOCYTES NFR BLD: 20 % (ref 12–49)
MCH RBC QN AUTO: 28.1 PG (ref 26–34)
MCHC RBC AUTO-ENTMCNC: 31.8 G/DL (ref 30–36.5)
MCV RBC AUTO: 88.4 FL (ref 80–99)
MONOCYTES # BLD: 0.9 K/UL (ref 0–1)
MONOCYTES NFR BLD: 13 % (ref 5–13)
NEUTS SEG # BLD: 4.3 K/UL (ref 1.8–8)
NEUTS SEG NFR BLD: 61 % (ref 32–75)
NRBC # BLD: 0 K/UL (ref 0–0.01)
NRBC BLD-RTO: 0 PER 100 WBC
PLATELET # BLD AUTO: 228 K/UL (ref 150–400)
PMV BLD AUTO: 10.9 FL (ref 8.9–12.9)
POTASSIUM SERPL-SCNC: 4 MMOL/L (ref 3.5–5.1)
RBC # BLD AUTO: 4.55 M/UL (ref 4.1–5.7)
REPORTED DOSE,DOSE: ABNORMAL UNITS
REPORTED DOSE/TIME,TMG: ABNORMAL
SODIUM SERPL-SCNC: 142 MMOL/L (ref 136–145)
VANCOMYCIN TROUGH SERPL-MCNC: 16.6 UG/ML (ref 5–10)
WBC # BLD AUTO: 7.1 K/UL (ref 4.1–11.1)

## 2019-08-29 PROCEDURE — 36592 COLLECT BLOOD FROM PICC: CPT

## 2019-08-29 PROCEDURE — 96365 THER/PROPH/DIAG IV INF INIT: CPT

## 2019-08-29 PROCEDURE — 96366 THER/PROPH/DIAG IV INF ADDON: CPT

## 2019-08-29 PROCEDURE — 36415 COLL VENOUS BLD VENIPUNCTURE: CPT

## 2019-08-29 PROCEDURE — 80048 BASIC METABOLIC PNL TOTAL CA: CPT

## 2019-08-29 PROCEDURE — 80202 ASSAY OF VANCOMYCIN: CPT

## 2019-08-29 PROCEDURE — 77030020847 HC STATLOK BARD -A

## 2019-08-29 PROCEDURE — 74011250636 HC RX REV CODE- 250/636

## 2019-08-29 PROCEDURE — 85025 COMPLETE CBC W/AUTO DIFF WBC: CPT

## 2019-08-29 RX ADMIN — VANCOMYCIN HYDROCHLORIDE 2000 MG: 1 INJECTION, POWDER, LYOPHILIZED, FOR SOLUTION INTRAVENOUS at 16:20

## 2019-08-29 NOTE — PROGRESS NOTES
1520 Pt admit to [facility] for Vancomycin ambulatory in stable condition. Assessment completed. No new concerns voiced. PICC flushed with positive blood return, labs drawn and sent per orders. Dressing changed per protocol. Patient Vitals for the past 12 hrs:   Temp Pulse Resp BP   08/29/19 1523 97.1 °F (36.2 °C) 84 18 136/86       Medication:  Vancomycin 1750 mg      1830 Pt tolerated treatment well. PICC maintained positive blood return throughout treatment, flushed with positive blood return at conclusion, heparinized and curos capes placed on end claves. D/c home ambulatory in no distress. Pt aware of next [facility] appointment scheduled for 8/30/19. Recent Results (from the past 12 hour(s))   CBC WITH AUTOMATED DIFF    Collection Time: 08/29/19  4:03 PM   Result Value Ref Range    WBC 7.1 4.1 - 11.1 K/uL    RBC 4.55 4.10 - 5.70 M/uL    HGB 12.8 12.1 - 17.0 g/dL    HCT 40.2 36.6 - 50.3 %    MCV 88.4 80.0 - 99.0 FL    MCH 28.1 26.0 - 34.0 PG    MCHC 31.8 30.0 - 36.5 g/dL    RDW 14.5 11.5 - 14.5 %    PLATELET 463 556 - 146 K/uL    MPV 10.9 8.9 - 12.9 FL    NRBC 0.0 0  WBC    ABSOLUTE NRBC 0.00 0.00 - 0.01 K/uL    NEUTROPHILS 61 32 - 75 %    LYMPHOCYTES 20 12 - 49 %    MONOCYTES 13 5 - 13 %    EOSINOPHILS 4 0 - 7 %    BASOPHILS 1 0 - 1 %    IMMATURE GRANULOCYTES 1 (H) 0.0 - 0.5 %    ABS. NEUTROPHILS 4.3 1.8 - 8.0 K/UL    ABS. LYMPHOCYTES 1.4 0.8 - 3.5 K/UL    ABS. MONOCYTES 0.9 0.0 - 1.0 K/UL    ABS. EOSINOPHILS 0.3 0.0 - 0.4 K/UL    ABS. BASOPHILS 0.1 0.0 - 0.1 K/UL    ABS. IMM.  GRANS. 0.0 0.00 - 0.04 K/UL    DF AUTOMATED     METABOLIC PANEL, BASIC    Collection Time: 08/29/19  4:03 PM   Result Value Ref Range    Sodium 142 136 - 145 mmol/L    Potassium 4.0 3.5 - 5.1 mmol/L    Chloride 108 97 - 108 mmol/L    CO2 26 21 - 32 mmol/L    Anion gap 8 5 - 15 mmol/L    Glucose 83 65 - 100 mg/dL    BUN 20 6 - 20 MG/DL    Creatinine 1.53 (H) 0.70 - 1.30 MG/DL    BUN/Creatinine ratio 13 12 - 20      GFR est AA 56 (L) >60 ml/min/1.73m2    GFR est non-AA 46 (L) >60 ml/min/1.73m2    Calcium 9.1 8.5 - 10.1 MG/DL   VANCOMYCIN, TROUGH    Collection Time: 08/29/19  4:03 PM   Result Value Ref Range    Vancomycin,trough 16.6 (H) 5.0 - 10.0 ug/mL    Reported dose date: NOT PROVIDED      Reported dose time: NOT PROVIDED      Reported dose: NOT PROVIDED UNITS

## 2019-08-30 ENCOUNTER — HOSPITAL ENCOUNTER (OUTPATIENT)
Dept: INFUSION THERAPY | Age: 64
Discharge: HOME | End: 2019-08-30
Payer: OTHER GOVERNMENT

## 2019-08-30 VITALS
HEART RATE: 84 BPM | RESPIRATION RATE: 16 BRPM | DIASTOLIC BLOOD PRESSURE: 81 MMHG | SYSTOLIC BLOOD PRESSURE: 125 MMHG | TEMPERATURE: 97.6 F | OXYGEN SATURATION: 99 %

## 2019-08-30 PROCEDURE — 74011250636 HC RX REV CODE- 250/636: Performed by: NURSE PRACTITIONER

## 2019-08-30 PROCEDURE — 96366 THER/PROPH/DIAG IV INF ADDON: CPT

## 2019-08-30 PROCEDURE — 96365 THER/PROPH/DIAG IV INF INIT: CPT

## 2019-08-30 RX ORDER — HEPARIN 100 UNIT/ML
500 SYRINGE INTRAVENOUS AS NEEDED
Status: DISCONTINUED | OUTPATIENT
Start: 2019-08-30 | End: 2019-08-31 | Stop reason: HOSPADM

## 2019-08-30 RX ORDER — SODIUM CHLORIDE 9 MG/ML
10 INJECTION INTRAMUSCULAR; INTRAVENOUS; SUBCUTANEOUS AS NEEDED
Status: DISCONTINUED | OUTPATIENT
Start: 2019-08-30 | End: 2019-08-31 | Stop reason: HOSPADM

## 2019-08-30 RX ADMIN — SODIUM CHLORIDE, PRESERVATIVE FREE 500 UNITS: 5 INJECTION INTRAVENOUS at 18:03

## 2019-08-30 RX ADMIN — SODIUM CHLORIDE 10 ML: 9 INJECTION INTRAMUSCULAR; INTRAVENOUS; SUBCUTANEOUS at 18:03

## 2019-08-30 RX ADMIN — VANCOMYCIN HYDROCHLORIDE 2000 MG: 1 INJECTION, POWDER, LYOPHILIZED, FOR SOLUTION INTRAVENOUS at 15:31

## 2019-08-30 NOTE — PROGRESS NOTES
Outpatient Infusion Center Short Visit Progress Note    Name: Josue Chauhan  MRN: [id number]  : 1955         Pt admit to [facility] for antibiotics ambulatory in stable condition. Assessment completed. No new concerns voiced. Patient Vitals for the past 12 hrs:   Temp Pulse Resp BP SpO2   19 1750 97.6 °F (36.4 °C) 84 16 125/81 99 %   19 1513 97.5 °F (36.4 °C) 85 16 134/85 99 %       PICC with positive blood return. Line flushed and heparinized per protocol. Medications:  Vancomycin 2g     Pt tolerated treatment well. D/c home ambulatory in no distress. Pt aware of next appointment scheduled for 2019.       Shekhar Mc RN

## 2019-08-31 ENCOUNTER — HOSPITAL ENCOUNTER (OUTPATIENT)
Dept: INFUSION THERAPY | Age: 64
Discharge: HOME | End: 2019-08-31
Payer: OTHER GOVERNMENT

## 2019-08-31 VITALS
DIASTOLIC BLOOD PRESSURE: 72 MMHG | RESPIRATION RATE: 16 BRPM | TEMPERATURE: 97.3 F | OXYGEN SATURATION: 99 % | SYSTOLIC BLOOD PRESSURE: 166 MMHG | HEART RATE: 82 BPM

## 2019-08-31 PROCEDURE — 96365 THER/PROPH/DIAG IV INF INIT: CPT

## 2019-08-31 PROCEDURE — 74011250636 HC RX REV CODE- 250/636: Performed by: NURSE PRACTITIONER

## 2019-08-31 PROCEDURE — 96366 THER/PROPH/DIAG IV INF ADDON: CPT

## 2019-08-31 RX ORDER — SODIUM CHLORIDE 0.9 % (FLUSH) 0.9 %
5-10 SYRINGE (ML) INJECTION AS NEEDED
Status: DISCONTINUED | OUTPATIENT
Start: 2019-08-31 | End: 2019-09-01 | Stop reason: HOSPADM

## 2019-08-31 RX ORDER — HEPARIN 100 UNIT/ML
500 SYRINGE INTRAVENOUS AS NEEDED
Status: DISCONTINUED | OUTPATIENT
Start: 2019-08-31 | End: 2019-09-01 | Stop reason: HOSPADM

## 2019-08-31 RX ADMIN — Medication 500 UNITS: at 11:41

## 2019-08-31 RX ADMIN — Medication 500 UNITS: at 09:18

## 2019-08-31 RX ADMIN — Medication 10 ML: at 09:15

## 2019-08-31 RX ADMIN — Medication 10 ML: at 09:17

## 2019-08-31 RX ADMIN — VANCOMYCIN HYDROCHLORIDE 2000 MG: 1 INJECTION, POWDER, LYOPHILIZED, FOR SOLUTION INTRAVENOUS at 09:15

## 2019-09-01 ENCOUNTER — HOSPITAL ENCOUNTER (OUTPATIENT)
Dept: INFUSION THERAPY | Age: 64
Discharge: HOME | End: 2019-09-01
Payer: OTHER GOVERNMENT

## 2019-09-01 VITALS
SYSTOLIC BLOOD PRESSURE: 143 MMHG | TEMPERATURE: 97.1 F | RESPIRATION RATE: 18 BRPM | DIASTOLIC BLOOD PRESSURE: 89 MMHG | HEART RATE: 88 BPM

## 2019-09-01 PROCEDURE — 74011250636 HC RX REV CODE- 250/636: Performed by: NURSE PRACTITIONER

## 2019-09-01 PROCEDURE — 96366 THER/PROPH/DIAG IV INF ADDON: CPT

## 2019-09-01 PROCEDURE — 96365 THER/PROPH/DIAG IV INF INIT: CPT

## 2019-09-01 PROCEDURE — 74011250636 HC RX REV CODE- 250/636: Performed by: INTERNAL MEDICINE

## 2019-09-01 RX ORDER — HEPARIN 100 UNIT/ML
500 SYRINGE INTRAVENOUS AS NEEDED
Status: DISCONTINUED | OUTPATIENT
Start: 2019-09-01 | End: 2019-09-02 | Stop reason: HOSPADM

## 2019-09-01 RX ORDER — SODIUM CHLORIDE 9 MG/ML
10 INJECTION INTRAMUSCULAR; INTRAVENOUS; SUBCUTANEOUS AS NEEDED
Status: DISCONTINUED | OUTPATIENT
Start: 2019-09-01 | End: 2019-09-02 | Stop reason: HOSPADM

## 2019-09-01 RX ADMIN — VANCOMYCIN HYDROCHLORIDE 2000 MG: 10 INJECTION, POWDER, LYOPHILIZED, FOR SOLUTION INTRAVENOUS at 09:15

## 2019-09-01 RX ADMIN — Medication 500 UNITS: at 11:34

## 2019-09-01 RX ADMIN — Medication 500 UNITS: at 11:30

## 2019-09-01 RX ADMIN — SODIUM CHLORIDE 10 ML: 9 INJECTION INTRAMUSCULAR; INTRAVENOUS; SUBCUTANEOUS at 11:34

## 2019-09-01 RX ADMIN — SODIUM CHLORIDE 10 ML: 9 INJECTION INTRAMUSCULAR; INTRAVENOUS; SUBCUTANEOUS at 11:30

## 2019-09-01 NOTE — PROGRESS NOTES
0915 Pt admit to [facility] for Vancomycin ambulatory in stable condition. Assessment completed. No new concerns voiced. PICC flushed with positive blood return,     Patient Vitals for the past 12 hrs:   Temp Pulse Resp BP   09/01/19 0917 97.1 °F (36.2 °C) 88 18 143/89       Medication:  Vancomycin 2000 mg IVPB over 2 hours      1135 Pt tolerated treatment well. PICC maintained positive blood return throughout treatment, flushed with positive blood return at conclusion, heparinized and curos capes placed on end claves. D/c home ambulatory in no distress. Pt aware of next [facility] appointment scheduled for 9/2/19.

## 2019-09-02 ENCOUNTER — HOSPITAL ENCOUNTER (OUTPATIENT)
Dept: INFUSION THERAPY | Age: 64
Discharge: HOME | End: 2019-09-02
Payer: OTHER GOVERNMENT

## 2019-09-02 VITALS
HEART RATE: 80 BPM | TEMPERATURE: 96.8 F | SYSTOLIC BLOOD PRESSURE: 138 MMHG | DIASTOLIC BLOOD PRESSURE: 90 MMHG | RESPIRATION RATE: 18 BRPM

## 2019-09-02 LAB
ANION GAP SERPL CALC-SCNC: 10 MMOL/L (ref 5–15)
BASOPHILS # BLD: 0.1 K/UL (ref 0–0.1)
BASOPHILS NFR BLD: 1 % (ref 0–1)
BUN SERPL-MCNC: 21 MG/DL (ref 6–20)
BUN/CREAT SERPL: 12 (ref 12–20)
CALCIUM SERPL-MCNC: 9.2 MG/DL (ref 8.5–10.1)
CHLORIDE SERPL-SCNC: 106 MMOL/L (ref 97–108)
CO2 SERPL-SCNC: 24 MMOL/L (ref 21–32)
CREAT SERPL-MCNC: 1.73 MG/DL (ref 0.7–1.3)
DATE LAST DOSE: ABNORMAL
DIFFERENTIAL METHOD BLD: NORMAL
EOSINOPHIL # BLD: 0.4 K/UL (ref 0–0.4)
EOSINOPHIL NFR BLD: 6 % (ref 0–7)
ERYTHROCYTE [DISTWIDTH] IN BLOOD BY AUTOMATED COUNT: 14.2 % (ref 11.5–14.5)
GLUCOSE SERPL-MCNC: 108 MG/DL (ref 65–100)
HCT VFR BLD AUTO: 42.5 % (ref 36.6–50.3)
HGB BLD-MCNC: 13.6 G/DL (ref 12.1–17)
IMM GRANULOCYTES # BLD AUTO: 0 K/UL (ref 0–0.04)
IMM GRANULOCYTES NFR BLD AUTO: 0 % (ref 0–0.5)
LYMPHOCYTES # BLD: 1.6 K/UL (ref 0.8–3.5)
LYMPHOCYTES NFR BLD: 22 % (ref 12–49)
MCH RBC QN AUTO: 28.3 PG (ref 26–34)
MCHC RBC AUTO-ENTMCNC: 32 G/DL (ref 30–36.5)
MCV RBC AUTO: 88.5 FL (ref 80–99)
MONOCYTES # BLD: 0.8 K/UL (ref 0–1)
MONOCYTES NFR BLD: 11 % (ref 5–13)
NEUTS SEG # BLD: 4.4 K/UL (ref 1.8–8)
NEUTS SEG NFR BLD: 60 % (ref 32–75)
NRBC # BLD: 0 K/UL (ref 0–0.01)
NRBC BLD-RTO: 0 PER 100 WBC
PLATELET # BLD AUTO: 243 K/UL (ref 150–400)
PMV BLD AUTO: 10.8 FL (ref 8.9–12.9)
POTASSIUM SERPL-SCNC: 3.6 MMOL/L (ref 3.5–5.1)
RBC # BLD AUTO: 4.8 M/UL (ref 4.1–5.7)
REPORTED DOSE,DOSE: ABNORMAL UNITS
REPORTED DOSE/TIME,TMG: ABNORMAL
SODIUM SERPL-SCNC: 140 MMOL/L (ref 136–145)
VANCOMYCIN TROUGH SERPL-MCNC: 17.4 UG/ML (ref 5–10)
WBC # BLD AUTO: 7.3 K/UL (ref 4.1–11.1)

## 2019-09-02 PROCEDURE — 96365 THER/PROPH/DIAG IV INF INIT: CPT

## 2019-09-02 PROCEDURE — 74011250636 HC RX REV CODE- 250/636: Performed by: NURSE PRACTITIONER

## 2019-09-02 PROCEDURE — 96366 THER/PROPH/DIAG IV INF ADDON: CPT

## 2019-09-02 PROCEDURE — 85025 COMPLETE CBC W/AUTO DIFF WBC: CPT

## 2019-09-02 PROCEDURE — 36415 COLL VENOUS BLD VENIPUNCTURE: CPT

## 2019-09-02 PROCEDURE — 80048 BASIC METABOLIC PNL TOTAL CA: CPT

## 2019-09-02 PROCEDURE — 74011250636 HC RX REV CODE- 250/636

## 2019-09-02 PROCEDURE — 80202 ASSAY OF VANCOMYCIN: CPT

## 2019-09-02 RX ORDER — HEPARIN 100 UNIT/ML
500 SYRINGE INTRAVENOUS AS NEEDED
Status: DISCONTINUED | OUTPATIENT
Start: 2019-09-02 | End: 2019-09-03 | Stop reason: HOSPADM

## 2019-09-02 RX ORDER — SODIUM CHLORIDE 0.9 % (FLUSH) 0.9 %
10-40 SYRINGE (ML) INJECTION AS NEEDED
Status: DISCONTINUED | OUTPATIENT
Start: 2019-09-02 | End: 2019-09-03 | Stop reason: HOSPADM

## 2019-09-02 RX ADMIN — Medication 10 ML: at 09:09

## 2019-09-02 RX ADMIN — Medication 10 ML: at 11:21

## 2019-09-02 RX ADMIN — Medication 500 UNITS: at 11:20

## 2019-09-02 RX ADMIN — Medication 500 UNITS: at 11:21

## 2019-09-02 RX ADMIN — Medication 10 ML: at 11:20

## 2019-09-02 RX ADMIN — Medication 10 ML: at 09:10

## 2019-09-02 RX ADMIN — VANCOMYCIN HYDROCHLORIDE 2000 MG: 1 INJECTION, POWDER, LYOPHILIZED, FOR SOLUTION INTRAVENOUS at 09:09

## 2019-09-02 NOTE — PROGRESS NOTES
[facility] VISIT NOTE    2288  Pt arrived at [facility] ambulatory and in no distress for IV Vancomycin. Assessment completed, patient denies any complaints today. Blood pressure 138/90, pulse 80, temperature 96.8 °F (36 °C), resp. rate 18. Right arm DL PICC line flushes easily. Positive blood return noted and labs drawn. Medications received:  Vancomycin IV over 2 hours    Tolerated treatment well, no adverse reaction noted. PICC line flushed with saline and heparin both lumens. 1140  D/C'd from [facility] ambulatory and in no distress. Next appointment is 9/3/19 at 1500.

## 2019-09-03 ENCOUNTER — HOSPITAL ENCOUNTER (OUTPATIENT)
Dept: INFUSION THERAPY | Age: 64
Discharge: HOME | End: 2019-09-03
Payer: OTHER GOVERNMENT

## 2019-09-03 VITALS — SYSTOLIC BLOOD PRESSURE: 134 MMHG | DIASTOLIC BLOOD PRESSURE: 84 MMHG | TEMPERATURE: 98.4 F | RESPIRATION RATE: 18 BRPM

## 2019-09-03 PROCEDURE — 96366 THER/PROPH/DIAG IV INF ADDON: CPT

## 2019-09-03 PROCEDURE — 96365 THER/PROPH/DIAG IV INF INIT: CPT

## 2019-09-03 PROCEDURE — 74011250636 HC RX REV CODE- 250/636: Performed by: NURSE PRACTITIONER

## 2019-09-03 RX ADMIN — VANCOMYCIN HYDROCHLORIDE 1750 MG: 1 INJECTION, POWDER, LYOPHILIZED, FOR SOLUTION INTRAVENOUS at 15:52

## 2019-09-03 NOTE — PROGRESS NOTES
Pharmacist Note - Vancomycin Dosing  Therapy day 33  Indication: necrotizing fasciitis  Current regimen: 2000 mg IV Q24H    A Trough Level resulted at 17.4 mcg/mL which was obtained 24 hrs post-dose. SCr with slight increase from 1.53 on 8/29/19 to 1.73 on 9/2/19. Goal trough: 15 - 20 mcg/mL     Plan: Change to 1750 mg IV Q24H for predicted trough of 15.25 mcg/mL due to increase in trough level and SCr from last visit . Pharmacy will continue to monitor this patient daily for changes in clinical status and renal function.       Ervin Whitman, KarlosD

## 2019-09-03 NOTE — PROGRESS NOTES
[facility]                       Date: September 3, 2019    Name: Jenni Stout    MRN: [id number]         : 1955    1510. Pt admit to [facility] for IV Vancomycin ambulatory in stable condition. Assessment completed. No new concerns voiced. PICC line flushes easily, positive blood return noted. Mr. Barber Lovell vitals were reviewed prior to treatment. Patient Vitals for the past 12 hrs:   Temp Resp BP   19 1514 98.4 °F (36.9 °C) 18 134/84       Medications Administered     vancomycin (VANCOCIN) 1,750 mg in 0.9% sodium chloride 500 mL IVPB     Admin Date  2019 Action  New Bag Dose  1750 mg Rate  250 mL/hr Route  IntraVENous Administered By  Lisa Khan RN              Flushed PIC with saline and heparin + capped. PICC line dressing changed 9/3/2019. Mr. Onesimo Dupree tolerated the infusion, and had no complaints. 1830. Mr. Onesimo Dupree was discharged from [place] [address] in stable condition.      Future Appointments   Date Time Provider Scott Hartman   2019  4:00 [facility]    2019  3:00 [facility]    2019  3:00 PM [facility]    2019  8:30 AM [facility]    2019  8:30 [facility]  September 3, 2019  5:58 PM

## 2019-09-04 ENCOUNTER — HOSPITAL ENCOUNTER (OUTPATIENT)
Dept: INFUSION THERAPY | Age: 64
Discharge: HOME | End: 2019-09-04
Payer: OTHER GOVERNMENT

## 2019-09-04 VITALS
RESPIRATION RATE: 18 BRPM | TEMPERATURE: 97 F | DIASTOLIC BLOOD PRESSURE: 74 MMHG | HEART RATE: 91 BPM | SYSTOLIC BLOOD PRESSURE: 122 MMHG

## 2019-09-04 PROCEDURE — 74011250636 HC RX REV CODE- 250/636

## 2019-09-04 PROCEDURE — 96365 THER/PROPH/DIAG IV INF INIT: CPT

## 2019-09-04 PROCEDURE — 74011250636 HC RX REV CODE- 250/636: Performed by: NURSE PRACTITIONER

## 2019-09-04 PROCEDURE — 96366 THER/PROPH/DIAG IV INF ADDON: CPT

## 2019-09-04 RX ORDER — HEPARIN 100 UNIT/ML
500 SYRINGE INTRAVENOUS AS NEEDED
Status: DISCONTINUED | OUTPATIENT
Start: 2019-09-04 | End: 2019-09-05 | Stop reason: HOSPADM

## 2019-09-04 RX ORDER — SODIUM CHLORIDE 9 MG/ML
10 INJECTION INTRAMUSCULAR; INTRAVENOUS; SUBCUTANEOUS AS NEEDED
Status: DISCONTINUED | OUTPATIENT
Start: 2019-09-04 | End: 2019-09-05 | Stop reason: HOSPADM

## 2019-09-04 RX ADMIN — VANCOMYCIN HYDROCHLORIDE 1750 MG: 1 INJECTION, POWDER, LYOPHILIZED, FOR SOLUTION INTRAVENOUS at 16:53

## 2019-09-04 RX ADMIN — Medication 500 UNITS: at 19:13

## 2019-09-04 RX ADMIN — Medication 500 UNITS: at 19:12

## 2019-09-04 RX ADMIN — SODIUM CHLORIDE 10 ML: 9 INJECTION INTRAMUSCULAR; INTRAVENOUS; SUBCUTANEOUS at 19:10

## 2019-09-04 RX ADMIN — SODIUM CHLORIDE 10 ML: 9 INJECTION INTRAMUSCULAR; INTRAVENOUS; SUBCUTANEOUS at 19:11

## 2019-09-05 ENCOUNTER — HOSPITAL ENCOUNTER (OUTPATIENT)
Dept: INFUSION THERAPY | Age: 64
Discharge: HOME | End: 2019-09-05
Payer: OTHER GOVERNMENT

## 2019-09-05 LAB
ANION GAP SERPL CALC-SCNC: 10 MMOL/L (ref 5–15)
BASOPHILS # BLD: 0.1 K/UL (ref 0–0.1)
BASOPHILS NFR BLD: 1 % (ref 0–1)
BUN SERPL-MCNC: 20 MG/DL (ref 6–20)
BUN/CREAT SERPL: 14 (ref 12–20)
CALCIUM SERPL-MCNC: 8.9 MG/DL (ref 8.5–10.1)
CHLORIDE SERPL-SCNC: 111 MMOL/L (ref 97–108)
CO2 SERPL-SCNC: 23 MMOL/L (ref 21–32)
CREAT SERPL-MCNC: 1.48 MG/DL (ref 0.7–1.3)
DATE LAST DOSE: ABNORMAL
DIFFERENTIAL METHOD BLD: NORMAL
EOSINOPHIL # BLD: 0.4 K/UL (ref 0–0.4)
EOSINOPHIL NFR BLD: 6 % (ref 0–7)
ERYTHROCYTE [DISTWIDTH] IN BLOOD BY AUTOMATED COUNT: 14.3 % (ref 11.5–14.5)
GLUCOSE SERPL-MCNC: 88 MG/DL (ref 65–100)
HCT VFR BLD AUTO: 39.3 % (ref 36.6–50.3)
HGB BLD-MCNC: 12.9 G/DL (ref 12.1–17)
IMM GRANULOCYTES # BLD AUTO: 0 K/UL (ref 0–0.04)
IMM GRANULOCYTES NFR BLD AUTO: 0 % (ref 0–0.5)
LYMPHOCYTES # BLD: 1.6 K/UL (ref 0.8–3.5)
LYMPHOCYTES NFR BLD: 23 % (ref 12–49)
MCH RBC QN AUTO: 28.9 PG (ref 26–34)
MCHC RBC AUTO-ENTMCNC: 32.8 G/DL (ref 30–36.5)
MCV RBC AUTO: 87.9 FL (ref 80–99)
MONOCYTES # BLD: 0.8 K/UL (ref 0–1)
MONOCYTES NFR BLD: 12 % (ref 5–13)
NEUTS SEG # BLD: 3.9 K/UL (ref 1.8–8)
NEUTS SEG NFR BLD: 58 % (ref 32–75)
NRBC # BLD: 0 K/UL (ref 0–0.01)
NRBC BLD-RTO: 0 PER 100 WBC
PLATELET # BLD AUTO: 228 K/UL (ref 150–400)
PMV BLD AUTO: 10.2 FL (ref 8.9–12.9)
POTASSIUM SERPL-SCNC: 4 MMOL/L (ref 3.5–5.1)
RBC # BLD AUTO: 4.47 M/UL (ref 4.1–5.7)
REPORTED DOSE,DOSE: ABNORMAL UNITS
REPORTED DOSE/TIME,TMG: ABNORMAL
SODIUM SERPL-SCNC: 144 MMOL/L (ref 136–145)
VANCOMYCIN TROUGH SERPL-MCNC: 14.3 UG/ML (ref 5–10)
WBC # BLD AUTO: 6.9 K/UL (ref 4.1–11.1)

## 2019-09-05 PROCEDURE — 96365 THER/PROPH/DIAG IV INF INIT: CPT

## 2019-09-05 PROCEDURE — 85025 COMPLETE CBC W/AUTO DIFF WBC: CPT

## 2019-09-05 PROCEDURE — 80048 BASIC METABOLIC PNL TOTAL CA: CPT

## 2019-09-05 PROCEDURE — 96366 THER/PROPH/DIAG IV INF ADDON: CPT

## 2019-09-05 PROCEDURE — 74011250636 HC RX REV CODE- 250/636: Performed by: NURSE PRACTITIONER

## 2019-09-05 PROCEDURE — 80202 ASSAY OF VANCOMYCIN: CPT

## 2019-09-05 PROCEDURE — 36415 COLL VENOUS BLD VENIPUNCTURE: CPT

## 2019-09-05 RX ADMIN — VANCOMYCIN HYDROCHLORIDE 1750 MG: 1 INJECTION, POWDER, LYOPHILIZED, FOR SOLUTION INTRAVENOUS at 15:42

## 2019-09-05 NOTE — PROGRESS NOTES
1 Pt admit to [facility] for Vancomycin ambulatory in stable condition. Assessment completed. No new concerns voiced. PICC with positive blood return. Labs drawn per order and sent for processing. Medications:  Medications Administered     vancomycin (VANCOCIN) 1,750 mg in 0.9% sodium chloride 500 mL IVPB     Admin Date  09/05/2019 Action  New Bag Dose  1750 mg Rate  250 mL/hr Route  IntraVENous Administered By  Narinder King RN                  1750 Pt tolerated treatment well. PICC maintained positive blood return throughout treatment, flushed with positive blood return at conclusion and PICC heparinized and curos cap placed on end clave. D/c home ambulatory in no distress. Pt aware of next [facility] appointment scheduled for 9/6/19. Recent Results (from the past 12 hour(s))   CBC WITH AUTOMATED DIFF    Collection Time: 09/05/19  3:41 PM   Result Value Ref Range    WBC 6.9 4.1 - 11.1 K/uL    RBC 4.47 4.10 - 5.70 M/uL    HGB 12.9 12.1 - 17.0 g/dL    HCT 39.3 36.6 - 50.3 %    MCV 87.9 80.0 - 99.0 FL    MCH 28.9 26.0 - 34.0 PG    MCHC 32.8 30.0 - 36.5 g/dL    RDW 14.3 11.5 - 14.5 %    PLATELET 577 461 - 442 K/uL    MPV 10.2 8.9 - 12.9 FL    NRBC 0.0 0  WBC    ABSOLUTE NRBC 0.00 0.00 - 0.01 K/uL    NEUTROPHILS 58 32 - 75 %    LYMPHOCYTES 23 12 - 49 %    MONOCYTES 12 5 - 13 %    EOSINOPHILS 6 0 - 7 %    BASOPHILS 1 0 - 1 %    IMMATURE GRANULOCYTES 0 0.0 - 0.5 %    ABS. NEUTROPHILS 3.9 1.8 - 8.0 K/UL    ABS. LYMPHOCYTES 1.6 0.8 - 3.5 K/UL    ABS. MONOCYTES 0.8 0.0 - 1.0 K/UL    ABS. EOSINOPHILS 0.4 0.0 - 0.4 K/UL    ABS. BASOPHILS 0.1 0.0 - 0.1 K/UL    ABS. IMM.  GRANS. 0.0 0.00 - 0.04 K/UL    DF AUTOMATED     METABOLIC PANEL, BASIC    Collection Time: 09/05/19  3:41 PM   Result Value Ref Range    Sodium 144 136 - 145 mmol/L    Potassium 4.0 3.5 - 5.1 mmol/L    Chloride 111 (H) 97 - 108 mmol/L    CO2 23 21 - 32 mmol/L    Anion gap 10 5 - 15 mmol/L    Glucose 88 65 - 100 mg/dL    BUN 20 6 - 20 MG/DL Creatinine 1.48 (H) 0.70 - 1.30 MG/DL    BUN/Creatinine ratio 14 12 - 20      GFR est AA 58 (L) >60 ml/min/1.73m2    GFR est non-AA 48 (L) >60 ml/min/1.73m2    Calcium 8.9 8.5 - 10.1 MG/DL   VANCOMYCIN, TROUGH    Collection Time: 09/05/19  3:41 PM   Result Value Ref Range    Vancomycin,trough 14.3 (H) 5.0 - 10.0 ug/mL    Reported dose date: NOT PROVIDED      Reported dose time: NOT PROVIDED      Reported dose: NOT PROVIDED UNITS

## 2019-09-06 ENCOUNTER — HOSPITAL ENCOUNTER (OUTPATIENT)
Dept: INFUSION THERAPY | Age: 64
Discharge: HOME | End: 2019-09-06
Payer: OTHER GOVERNMENT

## 2019-09-06 VITALS
TEMPERATURE: 97.3 F | OXYGEN SATURATION: 98 % | HEART RATE: 85 BPM | RESPIRATION RATE: 18 BRPM | DIASTOLIC BLOOD PRESSURE: 72 MMHG | SYSTOLIC BLOOD PRESSURE: 144 MMHG

## 2019-09-06 PROCEDURE — 74011250636 HC RX REV CODE- 250/636: Performed by: NURSE PRACTITIONER

## 2019-09-06 PROCEDURE — 96365 THER/PROPH/DIAG IV INF INIT: CPT

## 2019-09-06 PROCEDURE — 96366 THER/PROPH/DIAG IV INF ADDON: CPT

## 2019-09-06 RX ADMIN — VANCOMYCIN HYDROCHLORIDE 2000 MG: 1 INJECTION, POWDER, LYOPHILIZED, FOR SOLUTION INTRAVENOUS at 16:00

## 2019-09-06 NOTE — PROGRESS NOTES
Pt admit to [facility] for Vancomycin ambulatory in stable condition. Assessment completed. No new concerns voiced. PICC with positive blood return. Labs drawn per order and sent for processing. Medications:  Medications Administered     vancomycin (VANCOCIN) 2,000 mg in 0.9% sodium chloride 500 mL IVPB     Admin Date  09/06/2019 Action  New Bag Dose  2000 mg Rate  250 mL/hr Route  IntraVENous Administered By  Rudi Trivedi, RN                     8779 Pt tolerated treatment well. PICC maintained positive blood return throughout treatment, flushed with positive blood return at conclusion and PICC heparinized and curos cap placed on end clave. D/c home ambulatory in no distress. Pt aware of next [facility] appointment scheduled for 9/7/19.

## 2019-09-06 NOTE — PROGRESS NOTES
Pharmacist Note - Vancomycin Dosing  Therapy day 36  Indication: necrotizing fasciitis   Current regimen: 1750 mg IV Q24H    A Trough Level resulted at 14.3 mcg/mL which was obtained 23 hrs post-dose. The extrapolated \"true\" trough is approximately 13.69 mcg/mL based on the patient's known kinetics. Goal trough: 15 - 20 mcg/mL     Plan: Change to 2000 mg IV Q24H for predicted trough of 15.62 mcg/mL . Pharmacy will continue to monitor this patient daily for changes in clinical status and renal function.      Fernanda Bradshaw, PharmD

## 2019-09-07 ENCOUNTER — HOSPITAL ENCOUNTER (OUTPATIENT)
Dept: INFUSION THERAPY | Age: 64
Discharge: HOME | End: 2019-09-07
Payer: OTHER GOVERNMENT

## 2019-09-07 VITALS
SYSTOLIC BLOOD PRESSURE: 150 MMHG | TEMPERATURE: 97 F | RESPIRATION RATE: 18 BRPM | DIASTOLIC BLOOD PRESSURE: 92 MMHG | HEART RATE: 89 BPM

## 2019-09-07 PROCEDURE — 96365 THER/PROPH/DIAG IV INF INIT: CPT

## 2019-09-07 PROCEDURE — 74011250636 HC RX REV CODE- 250/636: Performed by: NURSE PRACTITIONER

## 2019-09-07 PROCEDURE — 96366 THER/PROPH/DIAG IV INF ADDON: CPT

## 2019-09-07 RX ADMIN — VANCOMYCIN HYDROCHLORIDE 2000 MG: 1 INJECTION, POWDER, LYOPHILIZED, FOR SOLUTION INTRAVENOUS at 08:45

## 2019-09-07 NOTE — PROGRESS NOTES
Outpatient Infusion Center    Visit Progress Note    0845 Patient admitted to [facility] for Vancomycin ambulatory in stable condition. Assessment completed. No new concerns voiced. Patient Vitals for the past 12 hrs:   Temp Pulse Resp BP   09/07/19 0850 97 °F (36.1 °C) 89 18 (!) 150/92       No labs were drawn today    PICC with positive blood return to both lumens. Medications:  Vancomycin    1115 Patient tolerated treatment well. Patient discharged from [facility] ambulatory in no distress.

## 2019-09-08 ENCOUNTER — HOSPITAL ENCOUNTER (OUTPATIENT)
Dept: INFUSION THERAPY | Age: 64
Discharge: HOME | End: 2019-09-08
Payer: OTHER GOVERNMENT

## 2019-09-08 VITALS
DIASTOLIC BLOOD PRESSURE: 92 MMHG | TEMPERATURE: 97.5 F | RESPIRATION RATE: 18 BRPM | SYSTOLIC BLOOD PRESSURE: 135 MMHG | HEART RATE: 75 BPM

## 2019-09-08 PROCEDURE — 74011000250 HC RX REV CODE- 250

## 2019-09-08 PROCEDURE — 96366 THER/PROPH/DIAG IV INF ADDON: CPT

## 2019-09-08 PROCEDURE — 74011250636 HC RX REV CODE- 250/636: Performed by: NURSE PRACTITIONER

## 2019-09-08 PROCEDURE — 96365 THER/PROPH/DIAG IV INF INIT: CPT

## 2019-09-08 RX ORDER — BACITRACIN 500 UNIT/G
1 PACKET (EA) TOPICAL
Status: COMPLETED | OUTPATIENT
Start: 2019-09-08 | End: 2019-09-08

## 2019-09-08 RX ADMIN — VANCOMYCIN HYDROCHLORIDE 2000 MG: 1 INJECTION, POWDER, LYOPHILIZED, FOR SOLUTION INTRAVENOUS at 09:00

## 2019-09-08 RX ADMIN — BACITRACIN 1 PACKET: 500 OINTMENT TOPICAL at 11:14

## 2020-12-23 ENCOUNTER — HOSPITAL ENCOUNTER (OUTPATIENT)
Dept: INFUSION THERAPY | Age: 65
Discharge: HOME | End: 2020-12-23
Payer: OTHER GOVERNMENT

## 2020-12-23 VITALS
TEMPERATURE: 97.5 F | SYSTOLIC BLOOD PRESSURE: 114 MMHG | DIASTOLIC BLOOD PRESSURE: 76 MMHG | RESPIRATION RATE: 18 BRPM | HEART RATE: 76 BPM

## 2020-12-23 LAB
ALBUMIN SERPL-MCNC: 3.2 G/DL (ref 3.5–5)
ALBUMIN/GLOB SERPL: 0.6 {RATIO} (ref 1.1–2.2)
ALP SERPL-CCNC: 43 U/L (ref 45–117)
ALT SERPL-CCNC: 24 U/L (ref 12–78)
ANION GAP SERPL CALC-SCNC: 6 MMOL/L (ref 5–15)
AST SERPL-CCNC: 28 U/L (ref 15–37)
BASOPHILS # BLD: 0.1 K/UL (ref 0–0.1)
BASOPHILS NFR BLD: 1 % (ref 0–1)
BILIRUB SERPL-MCNC: 0.5 MG/DL (ref 0.2–1)
BUN SERPL-MCNC: 24 MG/DL (ref 6–20)
BUN/CREAT SERPL: 16 (ref 12–20)
CALCIUM SERPL-MCNC: 9.6 MG/DL (ref 8.5–10.1)
CHLORIDE SERPL-SCNC: 104 MMOL/L (ref 97–108)
CK SERPL-CCNC: 136 U/L (ref 39–308)
CO2 SERPL-SCNC: 25 MMOL/L (ref 21–32)
CREAT SERPL-MCNC: 1.5 MG/DL (ref 0.7–1.3)
DIFFERENTIAL METHOD BLD: ABNORMAL
EOSINOPHIL # BLD: 0.2 K/UL (ref 0–0.4)
EOSINOPHIL NFR BLD: 2 % (ref 0–7)
ERYTHROCYTE [DISTWIDTH] IN BLOOD BY AUTOMATED COUNT: 13.3 % (ref 11.5–14.5)
GLOBULIN SER CALC-MCNC: 5 G/DL (ref 2–4)
GLUCOSE SERPL-MCNC: 129 MG/DL (ref 65–100)
HCT VFR BLD AUTO: 39.7 % (ref 36.6–50.3)
HGB BLD-MCNC: 13 G/DL (ref 12.1–17)
IMM GRANULOCYTES # BLD AUTO: 0.1 K/UL (ref 0–0.04)
IMM GRANULOCYTES NFR BLD AUTO: 1 % (ref 0–0.5)
LYMPHOCYTES # BLD: 1.7 K/UL (ref 0.8–3.5)
LYMPHOCYTES NFR BLD: 14 % (ref 12–49)
MCH RBC QN AUTO: 28.1 PG (ref 26–34)
MCHC RBC AUTO-ENTMCNC: 32.7 G/DL (ref 30–36.5)
MCV RBC AUTO: 85.9 FL (ref 80–99)
MONOCYTES # BLD: 1.1 K/UL (ref 0–1)
MONOCYTES NFR BLD: 9 % (ref 5–13)
NEUTS SEG # BLD: 9.5 K/UL (ref 1.8–8)
NEUTS SEG NFR BLD: 73 % (ref 32–75)
NRBC # BLD: 0 K/UL (ref 0–0.01)
NRBC BLD-RTO: 0 PER 100 WBC
PLATELET # BLD AUTO: 380 K/UL (ref 150–400)
PMV BLD AUTO: 10.2 FL (ref 8.9–12.9)
POTASSIUM SERPL-SCNC: 3.7 MMOL/L (ref 3.5–5.1)
PROT SERPL-MCNC: 8.2 G/DL (ref 6.4–8.2)
RBC # BLD AUTO: 4.62 M/UL (ref 4.1–5.7)
SODIUM SERPL-SCNC: 135 MMOL/L (ref 136–145)
WBC # BLD AUTO: 12.7 K/UL (ref 4.1–11.1)

## 2020-12-23 PROCEDURE — 74011000250 HC RX REV CODE- 250: Performed by: NURSE PRACTITIONER

## 2020-12-23 PROCEDURE — 74011000258 HC RX REV CODE- 258: Performed by: NURSE PRACTITIONER

## 2020-12-23 PROCEDURE — 85025 COMPLETE CBC W/AUTO DIFF WBC: CPT

## 2020-12-23 PROCEDURE — 82550 ASSAY OF CK (CPK): CPT

## 2020-12-23 PROCEDURE — 74011250636 HC RX REV CODE- 250/636

## 2020-12-23 PROCEDURE — 36415 COLL VENOUS BLD VENIPUNCTURE: CPT

## 2020-12-23 PROCEDURE — 74011250636 HC RX REV CODE- 250/636: Performed by: NURSE PRACTITIONER

## 2020-12-23 PROCEDURE — 96365 THER/PROPH/DIAG IV INF INIT: CPT

## 2020-12-23 PROCEDURE — 96375 TX/PRO/DX INJ NEW DRUG ADDON: CPT

## 2020-12-23 PROCEDURE — 80053 COMPREHEN METABOLIC PANEL: CPT

## 2020-12-23 RX ORDER — SODIUM CHLORIDE 0.9 % (FLUSH) 0.9 %
10 SYRINGE (ML) INJECTION AS NEEDED
Status: DISCONTINUED | OUTPATIENT
Start: 2020-12-23 | End: 2020-12-24 | Stop reason: HOSPADM

## 2020-12-23 RX ORDER — SODIUM CHLORIDE 900 MG/100ML
INJECTION INTRAVENOUS
Status: DISPENSED
Start: 2020-12-23 | End: 2020-12-23

## 2020-12-23 RX ORDER — HEPARIN 100 UNIT/ML
500 SYRINGE INTRAVENOUS AS NEEDED
Status: DISCONTINUED | OUTPATIENT
Start: 2020-12-23 | End: 2020-12-24 | Stop reason: HOSPADM

## 2020-12-23 RX ADMIN — DAPTOMYCIN 800 MG: 500 INJECTION, POWDER, LYOPHILIZED, FOR SOLUTION INTRAVENOUS at 11:45

## 2020-12-23 RX ADMIN — Medication 500 UNITS: at 11:31

## 2020-12-23 RX ADMIN — Medication 500 UNITS: at 11:48

## 2020-12-23 RX ADMIN — Medication 10 ML: at 11:43

## 2020-12-23 RX ADMIN — SODIUM CHLORIDE 1 G: 900 INJECTION INTRAVENOUS at 10:46

## 2020-12-23 RX ADMIN — Medication 10 ML: at 11:30

## 2020-12-23 RX ADMIN — Medication 10 ML: at 10:15

## 2020-12-23 RX ADMIN — Medication 10 ML: at 11:47

## 2020-12-23 NOTE — PROGRESS NOTES
[address] [facility] @ [facility] VISIT NOTE    1005 Patient arrives for Daily IV antibiotics without acute problems. Please see connect care for complete assessment and education provided. Vital signs stable throughout and prior to discharge, Pt. Tolerated treatment well and discharged without incident. Patient is aware of next [facility] appointment on 12/24/2020. Appointment card given to patient. Medications Verified by Federica Santos RN via [employer]:  1. NS Flush  2. Invanz 1gm IV  3. Daptomycin 800mg IV push    VITAL SIGNS   Patient Vitals for the past 12 hrs:   Temp Pulse Resp BP   12/23/20 1015 97.5 °F (36.4 °C) 76 18 114/76       LAB WORK Lab results pending, please see Connect Care for results. Recent Results (from the past 12 hour(s))   METABOLIC PANEL, COMPREHENSIVE    Collection Time: 12/23/20 10:10 AM   Result Value Ref Range    Sodium 135 (L) 136 - 145 mmol/L    Potassium 3.7 3.5 - 5.1 mmol/L    Chloride 104 97 - 108 mmol/L    CO2 25 21 - 32 mmol/L    Anion gap 6 5 - 15 mmol/L    Glucose 129 (H) 65 - 100 mg/dL    BUN 24 (H) 6 - 20 MG/DL    Creatinine 1.50 (H) 0.70 - 1.30 MG/DL    BUN/Creatinine ratio 16 12 - 20      GFR est AA 57 (L) >60 ml/min/1.73m2    GFR est non-AA 47 (L) >60 ml/min/1.73m2    Calcium 9.6 8.5 - 10.1 MG/DL    Bilirubin, total 0.5 0.2 - 1.0 MG/DL    ALT (SGPT) 24 12 - 78 U/L    AST (SGOT) 28 15 - 37 U/L    Alk.  phosphatase 43 (L) 45 - 117 U/L    Protein, total 8.2 6.4 - 8.2 g/dL    Albumin 3.2 (L) 3.5 - 5.0 g/dL    Globulin 5.0 (H) 2.0 - 4.0 g/dL    A-G Ratio 0.6 (L) 1.1 - 2.2     CBC WITH AUTOMATED DIFF    Collection Time: 12/23/20 10:10 AM   Result Value Ref Range    WBC 12.7 (H) 4.1 - 11.1 K/uL    RBC 4.62 4.10 - 5.70 M/uL    HGB 13.0 12.1 - 17.0 g/dL    HCT 39.7 36.6 - 50.3 %    MCV 85.9 80.0 - 99.0 FL    MCH 28.1 26.0 - 34.0 PG    MCHC 32.7 30.0 - 36.5 g/dL    RDW 13.3 11.5 - 14.5 %    PLATELET 169 172 - 744 K/uL    MPV 10.2 8.9 - 12.9 FL    NRBC 0.0 0  WBC    ABSOLUTE NRBC 0.00 0.00 - 0.01 K/uL    NEUTROPHILS 73 32 - 75 %    LYMPHOCYTES 14 12 - 49 %    MONOCYTES 9 5 - 13 %    EOSINOPHILS 2 0 - 7 %    BASOPHILS 1 0 - 1 %    IMMATURE GRANULOCYTES 1 (H) 0.0 - 0.5 %    ABS. NEUTROPHILS 9.5 (H) 1.8 - 8.0 K/UL    ABS. LYMPHOCYTES 1.7 0.8 - 3.5 K/UL    ABS. MONOCYTES 1.1 (H) 0.0 - 1.0 K/UL    ABS. EOSINOPHILS 0.2 0.0 - 0.4 K/UL    ABS. BASOPHILS 0.1 0.0 - 0.1 K/UL    ABS. IMM.  GRANS. 0.1 (H) 0.00 - 0.04 K/UL    DF AUTOMATED     CK    Collection Time: 12/23/20 10:10 AM   Result Value Ref Range     39 - 308 U/L

## 2020-12-24 ENCOUNTER — HOSPITAL ENCOUNTER (OUTPATIENT)
Dept: INFUSION THERAPY | Age: 65
Discharge: HOME | End: 2020-12-24
Payer: OTHER GOVERNMENT

## 2020-12-24 VITALS
RESPIRATION RATE: 18 BRPM | TEMPERATURE: 97.6 F | DIASTOLIC BLOOD PRESSURE: 67 MMHG | HEART RATE: 84 BPM | SYSTOLIC BLOOD PRESSURE: 104 MMHG

## 2020-12-24 PROCEDURE — 74011250636 HC RX REV CODE- 250/636: Performed by: NURSE PRACTITIONER

## 2020-12-24 PROCEDURE — 74011000250 HC RX REV CODE- 250: Performed by: NURSE PRACTITIONER

## 2020-12-24 PROCEDURE — 74011250636 HC RX REV CODE- 250/636

## 2020-12-24 PROCEDURE — 96365 THER/PROPH/DIAG IV INF INIT: CPT

## 2020-12-24 PROCEDURE — 74011000258 HC RX REV CODE- 258: Performed by: NURSE PRACTITIONER

## 2020-12-24 PROCEDURE — 96375 TX/PRO/DX INJ NEW DRUG ADDON: CPT

## 2020-12-24 RX ORDER — HEPARIN 100 UNIT/ML
500 SYRINGE INTRAVENOUS AS NEEDED
Status: DISCONTINUED | OUTPATIENT
Start: 2020-12-24 | End: 2020-12-25 | Stop reason: HOSPADM

## 2020-12-24 RX ORDER — SODIUM CHLORIDE 0.9 % (FLUSH) 0.9 %
10 SYRINGE (ML) INJECTION AS NEEDED
Status: DISCONTINUED | OUTPATIENT
Start: 2020-12-24 | End: 2020-12-25 | Stop reason: HOSPADM

## 2020-12-24 RX ORDER — SODIUM CHLORIDE 9 MG/ML
25 INJECTION, SOLUTION INTRAVENOUS AS NEEDED
Status: DISCONTINUED | OUTPATIENT
Start: 2020-12-24 | End: 2020-12-25 | Stop reason: HOSPADM

## 2020-12-24 RX ADMIN — Medication 10 ML: at 11:18

## 2020-12-24 RX ADMIN — Medication 500 UNITS: at 11:18

## 2020-12-24 RX ADMIN — Medication 10 ML: at 11:20

## 2020-12-24 RX ADMIN — SODIUM CHLORIDE 1 G: 900 INJECTION INTRAVENOUS at 10:24

## 2020-12-24 RX ADMIN — Medication 10 ML: at 10:15

## 2020-12-24 RX ADMIN — SODIUM CHLORIDE 25 ML/HR: 900 INJECTION, SOLUTION INTRAVENOUS at 10:14

## 2020-12-24 RX ADMIN — DAPTOMYCIN 800 MG: 500 INJECTION, POWDER, LYOPHILIZED, FOR SOLUTION INTRAVENOUS at 11:16

## 2020-12-24 RX ADMIN — Medication 500 UNITS: at 11:20

## 2020-12-24 NOTE — PROGRESS NOTES
[address] @ [facility] VISIT NOTE    1005 Patient arrives for Daily IV Invanz & Daptomycin without acute problems. Please see connect care for complete assessment and education provided. Vital signs stable throughout and prior to discharge, Pt. Tolerated treatment well and discharged without incident. Patient is aware of next [facility] appointment on 12/25/2020. Appointment card given to patient. Medications Verified by Katelyn Shankar RN via [employer]:  1. Invanz 1gm IV over 30 mins  2. Daptomycin 800mg IVP over 2 mins  3. NS flush & IVF kvo  4. Heparin flush    VITAL SIGNS   Patient Vitals for the past 12 hrs:   Temp Pulse Resp BP   12/24/20 1120  84 18 104/67   12/24/20 1009 97.6 °F (36.4 °C) 93 18 115/75       LAB WORK Lab results pending, please see Connect Care for results. No results found for this or any previous visit (from the past 12 hour(s)).

## 2020-12-25 ENCOUNTER — HOSPITAL ENCOUNTER (OUTPATIENT)
Dept: INFUSION THERAPY | Age: 65
Discharge: HOME | End: 2020-12-25
Payer: OTHER GOVERNMENT

## 2020-12-25 VITALS
SYSTOLIC BLOOD PRESSURE: 124 MMHG | RESPIRATION RATE: 18 BRPM | DIASTOLIC BLOOD PRESSURE: 82 MMHG | OXYGEN SATURATION: 96 % | TEMPERATURE: 96.4 F | HEART RATE: 95 BPM

## 2020-12-25 PROCEDURE — 74011250636 HC RX REV CODE- 250/636: Performed by: NURSE PRACTITIONER

## 2020-12-25 PROCEDURE — 96365 THER/PROPH/DIAG IV INF INIT: CPT

## 2020-12-25 PROCEDURE — 96375 TX/PRO/DX INJ NEW DRUG ADDON: CPT

## 2020-12-25 PROCEDURE — 74011000258 HC RX REV CODE- 258: Performed by: NURSE PRACTITIONER

## 2020-12-25 PROCEDURE — 74011000250 HC RX REV CODE- 250: Performed by: NURSE PRACTITIONER

## 2020-12-25 RX ADMIN — DAPTOMYCIN 800 MG: 500 INJECTION, POWDER, LYOPHILIZED, FOR SOLUTION INTRAVENOUS at 09:10

## 2020-12-25 RX ADMIN — SODIUM CHLORIDE 1 G: 900 INJECTION INTRAVENOUS at 09:15

## 2020-12-26 ENCOUNTER — HOSPITAL ENCOUNTER (OUTPATIENT)
Dept: INFUSION THERAPY | Age: 65
Discharge: HOME | End: 2020-12-26
Payer: OTHER GOVERNMENT

## 2020-12-26 VITALS
HEART RATE: 83 BPM | RESPIRATION RATE: 18 BRPM | DIASTOLIC BLOOD PRESSURE: 78 MMHG | TEMPERATURE: 97.1 F | SYSTOLIC BLOOD PRESSURE: 130 MMHG

## 2020-12-26 PROCEDURE — 96375 TX/PRO/DX INJ NEW DRUG ADDON: CPT

## 2020-12-26 PROCEDURE — 74011000258 HC RX REV CODE- 258: Performed by: NURSE PRACTITIONER

## 2020-12-26 PROCEDURE — 74011250636 HC RX REV CODE- 250/636: Performed by: NURSE PRACTITIONER

## 2020-12-26 PROCEDURE — 96365 THER/PROPH/DIAG IV INF INIT: CPT

## 2020-12-26 PROCEDURE — 74011000250 HC RX REV CODE- 250: Performed by: NURSE PRACTITIONER

## 2020-12-26 RX ADMIN — SODIUM CHLORIDE 1 G: 900 INJECTION INTRAVENOUS at 08:57

## 2020-12-26 RX ADMIN — DAPTOMYCIN 800 MG: 500 INJECTION, POWDER, LYOPHILIZED, FOR SOLUTION INTRAVENOUS at 09:34

## 2020-12-26 NOTE — PROGRESS NOTES
Outpatient Infusion Center   Visit Progress Note    Patient admitted to [facility] for Daily ABX ambulatory in stable condition. Assessment completed. No new concerns voiced. Visit Vitals  /78   Pulse 83   Temp 97.1 °F (36.2 °C)   Resp 18       MAYKEL PICC with positive blood return. Medications:  Medications Administered     DAPTOmycin (CUBICIN) 800 mg in sterile water (preservative free) 16 mL IV syringe RF formulation     Admin Date  12/26/2020 Action  Given Dose  800 mg Route  IntraVENous Administered By  [facility] [place] [employer]          ertapenem [facility]) 1 g in 0.9% sodium chloride (MBP/ADV) 50 mL MBP     Admin Date  12/26/2020 Action  New Bag Dose  1 g Rate  100 mL/hr Route  IntraVENous Administered By  [facility] [place] [employer]              Patient tolerated treatment well. Patient discharged from [place] [address] ambulatory in no distress. Patient aware of next appointment.     Future Appointments   Date Time Provider Scott Hartman   12/27/2020  9:00 AM G2 CINDY FASTRACK [facility]   12/28/2020  5:00 PM H2 CINDY FASTRACK [facility]

## 2020-12-27 ENCOUNTER — HOSPITAL ENCOUNTER (OUTPATIENT)
Dept: INFUSION THERAPY | Age: 65
Discharge: HOME | End: 2020-12-27
Payer: OTHER GOVERNMENT

## 2020-12-27 VITALS
SYSTOLIC BLOOD PRESSURE: 126 MMHG | HEART RATE: 89 BPM | RESPIRATION RATE: 18 BRPM | DIASTOLIC BLOOD PRESSURE: 81 MMHG | TEMPERATURE: 97.5 F

## 2020-12-27 PROCEDURE — 74011000258 HC RX REV CODE- 258: Performed by: NURSE PRACTITIONER

## 2020-12-27 PROCEDURE — 74011250636 HC RX REV CODE- 250/636: Performed by: NURSE PRACTITIONER

## 2020-12-27 PROCEDURE — 96375 TX/PRO/DX INJ NEW DRUG ADDON: CPT

## 2020-12-27 PROCEDURE — 96365 THER/PROPH/DIAG IV INF INIT: CPT

## 2020-12-27 PROCEDURE — 74011000250 HC RX REV CODE- 250: Performed by: NURSE PRACTITIONER

## 2020-12-27 RX ADMIN — DAPTOMYCIN 800 MG: 500 INJECTION, POWDER, LYOPHILIZED, FOR SOLUTION INTRAVENOUS at 09:50

## 2020-12-27 RX ADMIN — SODIUM CHLORIDE 1 G: 900 INJECTION INTRAVENOUS at 09:16

## 2020-12-27 NOTE — PROGRESS NOTES
Outpatient Infusion Center Short Visit Progress Note    Patient admitted to [facility] for daily antibiotics ambulatory in stable condition. Assessment completed. No new concerns voiced. Covid Screening      1. Do you have any symptoms of COVID-19? SOB, coughing, fever, or generally not feeling well ? no  2. Have you been exposed to COVID-19 recently? no  3. Have you had any recent contact with family/friend that has a pending COVID test? no    Vital Signs:  Visit Vitals  /81   Pulse 89   Temp 97.5 °F (36.4 °C)   Resp 18         R arm Dual lumen PICC with positive blood return. Medications:  Medications Administered     DAPTOmycin (CUBICIN) 800 mg in sterile water (preservative free) 16 mL IV syringe RF formulation     Admin Date  12/27/2020 Action  Given Dose  800 mg Route  IntraVENous Administered By  Teresa Goss          ertapenem [facility]) 1 g in 0.9% sodium chloride (MBP/ADV) 50 mL MBP     Admin Date  12/27/2020 Action  New Bag Dose  1 g Rate  100 mL/hr Route  IntraVENous Administered By  Teresa Goss                 Patient tolerated treatment well. Patient discharged from [place] [address] ambulatory in no distress. Patient aware of next appointment.     Messi Baez RN    Future Appointments   Date Time Provider Scott Hartman   12/28/2020  5:00 PM H2 CINDY DONALDSON [facility]

## 2020-12-28 ENCOUNTER — HOSPITAL ENCOUNTER (OUTPATIENT)
Dept: INFUSION THERAPY | Age: 65
Discharge: HOME | End: 2020-12-28
Payer: OTHER GOVERNMENT

## 2020-12-28 VITALS
DIASTOLIC BLOOD PRESSURE: 80 MMHG | SYSTOLIC BLOOD PRESSURE: 106 MMHG | RESPIRATION RATE: 18 BRPM | TEMPERATURE: 96.8 F | HEART RATE: 89 BPM

## 2020-12-28 PROCEDURE — 96375 TX/PRO/DX INJ NEW DRUG ADDON: CPT

## 2020-12-28 PROCEDURE — 74011000258 HC RX REV CODE- 258: Performed by: NURSE PRACTITIONER

## 2020-12-28 PROCEDURE — 96365 THER/PROPH/DIAG IV INF INIT: CPT

## 2020-12-28 PROCEDURE — 74011250636 HC RX REV CODE- 250/636: Performed by: NURSE PRACTITIONER

## 2020-12-28 PROCEDURE — 74011000250 HC RX REV CODE- 250: Performed by: NURSE PRACTITIONER

## 2020-12-28 RX ADMIN — SODIUM CHLORIDE 1 G: 900 INJECTION INTRAVENOUS at 17:06

## 2020-12-28 RX ADMIN — DAPTOMYCIN 800 MG: 500 INJECTION, POWDER, LYOPHILIZED, FOR SOLUTION INTRAVENOUS at 17:41

## 2020-12-28 NOTE — PROGRESS NOTES
[facility] Progress Note    Date: 2020    Name: Tasneem Espino    MRN: [id number]         : 1955        1655: Pt arrived ambulatory to [facility] for Invanz/Daptomycin in stable condition. Assessment completed. No other complaints voiced at this time. PICC with positive blood return. Patient denies SOB, fever, cough, general not feeling well. Patient denies recent exposure to someone who has tested positive for COVID-19. Patient denies having contact with anyone who has a pending COVID test.      Patient Vitals for the past 12 hrs:   Temp Pulse Resp BP   20 1658 96.8 °F (36 °C) 89 18 106/80         Medications Administered     DAPTOmycin (CUBICIN) 800 mg in sterile water (preservative free) 16 mL IV syringe RF formulation     Admin Date  2020 Action  Given Dose  800 mg Route  IntraVENous Administered By  Pratima Vale RN          ertapenem [facility]) 1 g in 0.9% sodium chloride (MBP/ADV) 50 mL MBP     Admin Date  2020 Action  New Bag Dose  1 g Rate  100 mL/hr Route  IntraVENous Administered By  Pratima Vale RN                  6798: Tolerated treatment well, no adverse reactions noted. PICC flushed and heparinized and curos caps placed on end claves. D/Cd from [facility] ambulatory and in no distress.       Future Appointments   Date Time Provider Scott Hartman   2020  5:00 PM H2 CINDY FASTRACK [facility]   2020  5:00 PM H2 CINDY FASTRACK [facility]   2020 10:00 AM H2 CINDY FASTRACK [facility]   2021 11:00 AM H2 CINDY FASTRACK [facility]   2021 11:00 AM H2 CINDY FASTRACK [facility]   1/3/2021 10:00 AM H2 CINDY FASTRACK [facility]   2021  5:00 PM H2 CINDY FASTRACK [facility]   2021  5:00 PM H2 CINDY FASTRACK [facility]   2021  5:00 [facility]   2021  5:00 PM [facility]   2021  5:00 PM H2 CINDY FASTRACK [facility] Josh Avelar RN  December 28, 2020

## 2020-12-29 ENCOUNTER — HOSPITAL ENCOUNTER (OUTPATIENT)
Dept: INFUSION THERAPY | Age: 65
Discharge: HOME | End: 2020-12-29
Payer: OTHER GOVERNMENT

## 2020-12-29 VITALS
HEART RATE: 81 BPM | DIASTOLIC BLOOD PRESSURE: 72 MMHG | SYSTOLIC BLOOD PRESSURE: 110 MMHG | RESPIRATION RATE: 16 BRPM | TEMPERATURE: 97.2 F

## 2020-12-29 PROCEDURE — 77030020847 HC STATLOK BARD -A

## 2020-12-29 PROCEDURE — 74011000258 HC RX REV CODE- 258: Performed by: NURSE PRACTITIONER

## 2020-12-29 PROCEDURE — 96375 TX/PRO/DX INJ NEW DRUG ADDON: CPT

## 2020-12-29 PROCEDURE — 96365 THER/PROPH/DIAG IV INF INIT: CPT

## 2020-12-29 PROCEDURE — 74011250636 HC RX REV CODE- 250/636: Performed by: NURSE PRACTITIONER

## 2020-12-29 PROCEDURE — 74011000250 HC RX REV CODE- 250: Performed by: NURSE PRACTITIONER

## 2020-12-29 RX ADMIN — DAPTOMYCIN 800 MG: 500 INJECTION, POWDER, LYOPHILIZED, FOR SOLUTION INTRAVENOUS at 17:51

## 2020-12-29 RX ADMIN — SODIUM CHLORIDE 1 G: 900 INJECTION INTRAVENOUS at 17:03

## 2020-12-29 NOTE — PROGRESS NOTES
[facility] Progress Note    Date: 2020    Name: Kaushik Sheppard    MRN: [id number]         : 1955        1650: Pt arrived ambulatory to [facility] for daily Invanz/Daptomycin in stable condition. Assessment completed. No other complaints voiced at this time. PICC with positive blood return. Dressing changed per protocol. Patient denies SOB, fever, cough, general not feeling well. Patient denies recent exposure to someone who has tested positive for COVID-19. Patient denies having contact with anyone who has a pending COVID test.      Patient Vitals for the past 12 hrs:   Temp Pulse Resp BP   20 1657 97.2 °F (36.2 °C) 81 16 110/72         Medications Administered     DAPTOmycin (CUBICIN) 800 mg in sterile water (preservative free) 16 mL IV syringe RF formulation     Admin Date  2020 Action  Given Dose  800 mg Route  IntraVENous Administered By  Katya Baxter, GABRIEL          ertapenem [facility]) 1 g in 0.9% sodium chloride (MBP/ADV) 50 mL MBP     Admin Date  2020 Action  New Bag Dose  1 g Rate  100 mL/hr Route  IntraVENous Administered By  Katya Baxter, RN                       9186: Tolerated treatment well, no adverse reactions noted. PICC flushed and heparinized and curos caps placed on end claves. D/Cd from [facility] ambulatory and in no distress.       Future Appointments   Date Time Provider Scott Hartman   2020  5:00 PM H2 CINDY FASTRACK [facility]   2020 10:00 AM H2 [facility]INDY FASTRACK [facility]   2021 11:00 AM H2 [facility]INDY FASTRACK [facility]   2021 11:00 AM H2 CINDY FASTRACK [facility]   1/3/2021 10:00 AM H2 CINDY FASTRACK [facility]   2021  5:00 PM H2 CINDY FASTRACK [facility]   2021  5:00 PM H2 CINDY FASTRACK [facility]   2021  5:00 PM H2 CINDY FASTRACK [facility]   2021  5:00 [facility]   2021  5:00 PM Barbara Rodriguez Micah Jewell  December 29, 2020

## 2020-12-30 ENCOUNTER — HOSPITAL ENCOUNTER (OUTPATIENT)
Dept: INFUSION THERAPY | Age: 65
Discharge: HOME | End: 2020-12-30
Payer: OTHER GOVERNMENT

## 2020-12-30 VITALS
HEART RATE: 79 BPM | SYSTOLIC BLOOD PRESSURE: 107 MMHG | RESPIRATION RATE: 18 BRPM | DIASTOLIC BLOOD PRESSURE: 72 MMHG | TEMPERATURE: 97.3 F

## 2020-12-30 LAB
ALBUMIN SERPL-MCNC: 3.4 G/DL (ref 3.5–5)
ALBUMIN/GLOB SERPL: 0.8 {RATIO} (ref 1.1–2.2)
ALP SERPL-CCNC: 45 U/L (ref 45–117)
ALT SERPL-CCNC: 38 U/L (ref 12–78)
ANION GAP SERPL CALC-SCNC: 8 MMOL/L (ref 5–15)
AST SERPL-CCNC: 41 U/L (ref 15–37)
BASOPHILS # BLD: 0.1 K/UL (ref 0–0.1)
BASOPHILS NFR BLD: 1 % (ref 0–1)
BILIRUB SERPL-MCNC: 0.4 MG/DL (ref 0.2–1)
BUN SERPL-MCNC: 20 MG/DL (ref 6–20)
BUN/CREAT SERPL: 14 (ref 12–20)
CALCIUM SERPL-MCNC: 9 MG/DL (ref 8.5–10.1)
CHLORIDE SERPL-SCNC: 109 MMOL/L (ref 97–108)
CK SERPL-CCNC: 204 U/L (ref 39–308)
CO2 SERPL-SCNC: 24 MMOL/L (ref 21–32)
CREAT SERPL-MCNC: 1.42 MG/DL (ref 0.7–1.3)
DIFFERENTIAL METHOD BLD: ABNORMAL
EOSINOPHIL # BLD: 0.3 K/UL (ref 0–0.4)
EOSINOPHIL NFR BLD: 5 % (ref 0–7)
ERYTHROCYTE [DISTWIDTH] IN BLOOD BY AUTOMATED COUNT: 13.6 % (ref 11.5–14.5)
GLOBULIN SER CALC-MCNC: 4.4 G/DL (ref 2–4)
GLUCOSE SERPL-MCNC: 93 MG/DL (ref 65–100)
HCT VFR BLD AUTO: 39.7 % (ref 36.6–50.3)
HGB BLD-MCNC: 12.8 G/DL (ref 12.1–17)
IMM GRANULOCYTES # BLD AUTO: 0 K/UL (ref 0–0.04)
IMM GRANULOCYTES NFR BLD AUTO: 1 % (ref 0–0.5)
LYMPHOCYTES # BLD: 2 K/UL (ref 0.8–3.5)
LYMPHOCYTES NFR BLD: 28 % (ref 12–49)
MCH RBC QN AUTO: 27.9 PG (ref 26–34)
MCHC RBC AUTO-ENTMCNC: 32.2 G/DL (ref 30–36.5)
MCV RBC AUTO: 86.5 FL (ref 80–99)
MONOCYTES # BLD: 0.7 K/UL (ref 0–1)
MONOCYTES NFR BLD: 10 % (ref 5–13)
NEUTS SEG # BLD: 3.9 K/UL (ref 1.8–8)
NEUTS SEG NFR BLD: 55 % (ref 32–75)
NRBC # BLD: 0 K/UL (ref 0–0.01)
NRBC BLD-RTO: 0 PER 100 WBC
PLATELET # BLD AUTO: 380 K/UL (ref 150–400)
PMV BLD AUTO: 10.1 FL (ref 8.9–12.9)
POTASSIUM SERPL-SCNC: 4 MMOL/L (ref 3.5–5.1)
PROT SERPL-MCNC: 7.8 G/DL (ref 6.4–8.2)
RBC # BLD AUTO: 4.59 M/UL (ref 4.1–5.7)
SODIUM SERPL-SCNC: 141 MMOL/L (ref 136–145)
WBC # BLD AUTO: 7.1 K/UL (ref 4.1–11.1)

## 2020-12-30 PROCEDURE — 85025 COMPLETE CBC W/AUTO DIFF WBC: CPT

## 2020-12-30 PROCEDURE — 74011250636 HC RX REV CODE- 250/636: Performed by: NURSE PRACTITIONER

## 2020-12-30 PROCEDURE — 74011000250 HC RX REV CODE- 250: Performed by: NURSE PRACTITIONER

## 2020-12-30 PROCEDURE — 82550 ASSAY OF CK (CPK): CPT

## 2020-12-30 PROCEDURE — 36415 COLL VENOUS BLD VENIPUNCTURE: CPT

## 2020-12-30 PROCEDURE — 80053 COMPREHEN METABOLIC PANEL: CPT

## 2020-12-30 PROCEDURE — 96375 TX/PRO/DX INJ NEW DRUG ADDON: CPT

## 2020-12-30 PROCEDURE — 96365 THER/PROPH/DIAG IV INF INIT: CPT

## 2020-12-30 PROCEDURE — 74011000258 HC RX REV CODE- 258: Performed by: NURSE PRACTITIONER

## 2020-12-30 RX ADMIN — DAPTOMYCIN 800 MG: 500 INJECTION, POWDER, LYOPHILIZED, FOR SOLUTION INTRAVENOUS at 17:43

## 2020-12-30 RX ADMIN — SODIUM CHLORIDE 1 G: 900 INJECTION INTRAVENOUS at 16:58

## 2020-12-30 NOTE — PROGRESS NOTES
[facility]                       Date: December 30, 2020      1655 Pt admit to [address] [place] [address] for Daily Antibiotics ambulatory in stable condition. Assessment completed. No new concerns voiced. DL PICC flushed with positive blood return. Labs drawn and sent to lab for processing. Patient denies any symptoms of COVID-19, including SOB, coughing, fever, or generally not feeling well. Patient denies any recent exposure to COVID-19. Patient denies any recent contact with family or friends that have a pending COVID-19 test.    Mr. Ike Baer vitals were reviewed prior to and after treatment. Patient Vitals for the past 12 hrs:   Temp Pulse Resp BP   12/30/20 1654 97.3 °F (36.3 °C) 79 18 107/72       Medications given:   Medications Administered     DAPTOmycin (CUBICIN) 800 mg in sterile water (preservative free) 16 mL IV syringe RF formulation     Admin Date  12/30/2020 Action  Given Dose  800 mg Route  IntraVENous Administered By  Lynda Dux          ertapenem [facility]) 1 g in 0.9% sodium chloride (MBP/ADV) 50 mL MBP     Admin Date  12/30/2020 Action  New Bag Dose  1 g Rate  100 mL/hr Route  IntraVENous Administered By  [employer]                Mr. Ag Vidal tolerated the infusion, and had no complaints. DL PICC flushed, heparinized, and capped. Mr. Ag Vidal was discharged from [place] [address] in stable condition at 1750.      Future Appointments   Date Time Provider Scott Hartman   12/31/2020 10:00 AM H2 CINDY FASTRACK [facility]   1/1/2021 11:00 AM H2 CINDY FASTRACK [facility]   1/2/2021 11:00 AM H2 CINDY FASTRACK [facility]   1/3/2021 10:00 AM H2 CINDY FASTRACK [facility]   1/4/2021  5:00 PM H2 [facility]INDY FASTRACK [facility]   1/5/2021  5:00 PM H2 [facility]INDY FASTRACK [facility]   1/6/2021  5:00 PM H2 [facility]INDY FASTRACK [facility]   1/7/2021  5:[facility]    1/8/2021  5:00 PM [facility]  Sarina Hunt RN  December 30, 2020

## 2020-12-31 ENCOUNTER — HOSPITAL ENCOUNTER (OUTPATIENT)
Dept: INFUSION THERAPY | Age: 65
Discharge: HOME | End: 2020-12-31
Payer: OTHER GOVERNMENT

## 2020-12-31 VITALS — HEART RATE: 80 BPM | TEMPERATURE: 96.6 F | SYSTOLIC BLOOD PRESSURE: 117 MMHG | DIASTOLIC BLOOD PRESSURE: 72 MMHG

## 2020-12-31 PROCEDURE — 74011250636 HC RX REV CODE- 250/636: Performed by: NURSE PRACTITIONER

## 2020-12-31 PROCEDURE — 96375 TX/PRO/DX INJ NEW DRUG ADDON: CPT

## 2020-12-31 PROCEDURE — 96365 THER/PROPH/DIAG IV INF INIT: CPT

## 2020-12-31 PROCEDURE — 74011000250 HC RX REV CODE- 250: Performed by: NURSE PRACTITIONER

## 2020-12-31 PROCEDURE — 74011000258 HC RX REV CODE- 258: Performed by: NURSE PRACTITIONER

## 2020-12-31 RX ADMIN — SODIUM CHLORIDE 1 G: 900 INJECTION INTRAVENOUS at 10:16

## 2020-12-31 RX ADMIN — DAPTOMYCIN 800 MG: 500 INJECTION, POWDER, LYOPHILIZED, FOR SOLUTION INTRAVENOUS at 11:00

## 2020-12-31 NOTE — PROGRESS NOTES
[facility] Short Note                       Date: December 31, 2020    1000  Pt admit to [facility] for Daily Antibiotics ambulatory in stable condition. Assessment completed. No new concerns voiced. DL PICC flushed with positive blood return. Patient denies any symptoms of COVID-19, including SOB, coughing, fever, or generally not feeling well. Patient denies any recent exposure to COVID-19. Patient denies any recent contact with family or friends that have a pending COVID-19 test.    Mr. Hannah Mejia vitals were reviewed prior to and after treatment. Patient Vitals for the past 12 hrs:   Temp Pulse BP   12/31/20 1004 (!) 96.6 °F (35.9 °C) 80 117/72       Medications given:   NS at [address] [place] IV  Daptomycin IVP    Mr. Carina Burciaga tolerated the infusion, and had no complaints. DL PICC flushed, heparinized, and capped. Mr. Carina Burciaga was discharged from [place] [address] in stable condition at 1105.      Future Appointments   Date Time Provider Scott Hartman   1/1/2021 11:00 AM H2 CINDY FASTRACK [facility]   1/2/2021 11:00 AM H2 CINDY FASTRACK [facility]   1/3/2021 10:00 AM H2 CINDY FASTRACK [facility]   1/4/2021  5:00 PM H2 CINDY FASTRACK [facility]   1/5/2021  5:00 PM H2 CINDY FASTRACK [facility]   1/6/2021  5:00 PM H2 CINDY FASTRACK [facility]   1/7/2021  5:00 PM H2 CINDY FASTRACK [facility]   1/8/2021  5:00 PM H2 CINDY FASTRACK [facility]       Lon Quijano RN,   December 31, 2020

## 2021-01-01 ENCOUNTER — HOSPITAL ENCOUNTER (OUTPATIENT)
Dept: INFUSION THERAPY | Age: 66
Discharge: HOME | End: 2021-01-01
Payer: OTHER GOVERNMENT

## 2021-01-01 VITALS
TEMPERATURE: 96.8 F | HEART RATE: 77 BPM | DIASTOLIC BLOOD PRESSURE: 76 MMHG | RESPIRATION RATE: 18 BRPM | SYSTOLIC BLOOD PRESSURE: 125 MMHG

## 2021-01-01 PROCEDURE — 96374 THER/PROPH/DIAG INJ IV PUSH: CPT

## 2021-01-01 PROCEDURE — 74011250636 HC RX REV CODE- 250/636: Performed by: INTERNAL MEDICINE

## 2021-01-01 PROCEDURE — 74011250636 HC RX REV CODE- 250/636: Performed by: NURSE PRACTITIONER

## 2021-01-01 PROCEDURE — 96365 THER/PROPH/DIAG IV INF INIT: CPT

## 2021-01-01 PROCEDURE — 74011000250 HC RX REV CODE- 250: Performed by: NURSE PRACTITIONER

## 2021-01-01 PROCEDURE — 96375 TX/PRO/DX INJ NEW DRUG ADDON: CPT

## 2021-01-01 PROCEDURE — 74011000258 HC RX REV CODE- 258: Performed by: NURSE PRACTITIONER

## 2021-01-01 RX ORDER — SODIUM CHLORIDE 0.9 % (FLUSH) 0.9 %
5-10 SYRINGE (ML) INJECTION AS NEEDED
Status: DISCONTINUED | OUTPATIENT
Start: 2021-01-01 | End: 2021-01-02 | Stop reason: HOSPADM

## 2021-01-01 RX ORDER — HEPARIN 100 UNIT/ML
500 SYRINGE INTRAVENOUS AS NEEDED
Status: DISCONTINUED | OUTPATIENT
Start: 2021-01-01 | End: 2021-01-02 | Stop reason: HOSPADM

## 2021-01-01 RX ADMIN — Medication 10 ML: at 11:51

## 2021-01-01 RX ADMIN — DAPTOMYCIN 800 MG: 500 INJECTION, POWDER, LYOPHILIZED, FOR SOLUTION INTRAVENOUS at 11:45

## 2021-01-01 RX ADMIN — SODIUM CHLORIDE 1 G: 900 INJECTION INTRAVENOUS at 11:11

## 2021-01-01 RX ADMIN — HEPARIN 500 UNITS: 100 SYRINGE at 11:50

## 2021-01-01 RX ADMIN — HEPARIN 500 UNITS: 100 SYRINGE at 11:51

## 2021-01-01 RX ADMIN — Medication 10 ML: at 11:50

## 2021-01-01 NOTE — PROGRESS NOTES
[facility] Short Note                       Date: January 1, 2021        1100 Pt admit to [facility] for Daily Antibiotics ambulatory in stable condition. Assessment completed. No new concerns voiced. PICC line flushed with positive blood return. Patient denies any symptoms of COVID-19, including SOB, coughing, fever, or generally not feeling well. Patient denies any recent exposure to COVID-19. Patient denies any recent contact with family or friends that have a pending COVID-19 test.      Mr. Weston Marquez vitals were reviewed prior to and after treatment. Patient Vitals for the past 12 hrs:   Temp Pulse Resp BP   01/01/21 1105 96.8 °F (36 °C) 77 18 125/76       Medications given:   Medications Administered     DAPTOmycin (CUBICIN) 800 mg in sterile water (preservative free) 16 mL IV syringe RF formulation     Admin Date  01/01/2021 Action  Given Dose  800 mg Route  IntraVENous Administered By  Ez Mast, RN          ertapenem [facility]) 1 g in 0.9% sodium chloride (MBP/ADV) 50 mL MBP     Admin Date  01/01/2021 Action  New Bag Dose  1 g Rate  100 mL/hr Route  IntraVENous Administered By  Didier Singh          heparin (porcine) pf 500 Units     Admin Date  01/01/2021 Action  Given Dose  500 Units Route  IntraVENous Administered By  Veronika Ronquillo Date  01/01/2021 Action  Given Dose  500 Units Route  IntraVENous Administered By  Didier Singh          sodium chloride (NS) flush 5-10 mL     Admin Date  01/01/2021 Action  Given Dose  10 mL Route  IntraVENous Administered By  Veronika Ronquillo Date  01/01/2021 Action  Given Dose  10 mL Route  IntraVENous Administered By  Didier Singh                Mr. Antonio Santos tolerated the infusion, and had no complaints. DL PICC flushed, heparinized, and capped. Mr. Antonio Santos was discharged from [place] [address] in stable condition at 1155.       Future Appointments   Date Time Provider Scott Hartman   1/2/2021 11:00 AM [facility]   1/3/2021 10:00 AM H2 [facility]ACK [facility]   1/4/2021  5:00 PM H2 CINDY FASTRACK [facility]   1/5/2021  5:00 PM H2 CINDY FASTRACK [facility]   1/6/2021  5:00 PM H2 CINDY FASTRACK [facility]   1/7/2021  5:00 PM H2 CINDY FASTRACK [facility]   1/8/2021  5:00 PM H2 CINDY FASTRACK [facility]       Shabnam Brown RN  January 1, 2021

## 2021-01-01 NOTE — PROGRESS NOTES
Outpatient Infusion Center Short Visit Progress Note    1545Patient admitted to [facility] for Vancomycin ambulatory in stable condition. Assessment completed. No new concerns voiced. PICC line flushed with positive blood return. Labs drawn and sent for processing. Vital Signs:  Visit Vitals  BP (P) 145/88 (BP 1 Location: Left arm, BP Patient Position: At rest)   Pulse (P) 76   Temp (P) 97.6 °F (36.4 °C)   Resp (P) 18     Patient Vitals for the past 12 hrs:   Temp Pulse Resp BP   08/19/19 1819 (P) 97.6 °F (36.4 °C) (P) 76 (P) 18 (P) 145/88       Medications:  Medications Administered     heparin (porcine) pf 500 Units     Admin Date  08/17/2019 Action  Given Dose  500 Units Route  IntraVENous Administered By  Christianne Saavedra RN           Admin Date  08/17/2019 Action  Given Dose  500 Units Route  IntraVENous Administered By  Christianne Saavedra RN          sodium chloride (NS) flush 5-10 mL     Admin Date  08/17/2019 Action  Given Dose  10 mL Route  IntraVENous Administered By  Christianne Saavedra RN           Admin Date  08/17/2019 Action  Given Dose  10 mL Route  IntraVENous Administered By  Christianne Saavedra RN           Admin Date  08/17/2019 Action  Given Dose  10 mL Route  IntraVENous Administered By  Christianne Saavedra RN           Admin Date  08/17/2019 Action  Given Dose  10 mL Route  IntraVENous Administered By  Christianne Saavedra RN          vancomycin (VANCOCIN) 2,000 mg in 0.9% sodium chloride 500 mL IVPB     Admin Date  08/17/2019 Action  New Bag Dose  2000 mg Rate  250 mL/hr Route  IntraVENous Administered By  Fabien Card [facility] Patient tolerated treatment well. PICC flushed with positive blood return, heparinized, and capped per protocol. Patient discharged from [place] [address] ambulatory in no distress at 8/20/19. Patient aware of next appointment.     Future Appointments   Date Time Provider Scott Hartman   8/20/2019  3:00 PM [facility] FT CHAIR 2 [facility]   8/21/2019  4:00 PM [facility] FT CHAIR 2 [facility]   8/22/2019  3:00 PM [facility] FT CHAIR 2 [facility]   8/23/2019  3:00 PM [facility] FT CHAIR 2 [facility]   8/24/2019  9:00 AM [facility] FT CHAIR 2 [facility]   8/25/2019  8:00 AM [facility] FT CHAIR 2 [facility]   8/26/2019  3:00 PM [facility] FT CHAIR 2 [facility]   8/27/2019  3:00 PM [facility] FT CHAIR 2 [facility]   8/28/2019  4:00 PM [facility] FT CHAIR 2 [facility]   8/29/2019  3:00 PM [facility] FT CHAIR 2 [facility]   8/30/2019  3:00 PM [facility] FT CHAIR 2 [facility]   8/31/2019  9:00 AM [facility] FT CHAIR 2 [facility]   9/1/2019  9:00 AM [facility] FT CHAIR 2 [facility]   9/2/2019  9:00 AM [facility] FT CHAIR 2 [facility]   9/3/2019  3:00 PM [facility] FT CHAIR 2 [facility]   9/4/2019  4:00 PM [facility] FT CHAIR 2 [facility]   9/5/2019  3:00 PM [facility] FT CHAIR 2 [facility]   9/6/2019  3:00 PM [facility] FT CHAIR 2 [facility]   9/7/2019  3:00 PM [facility] FT CHAIR 2 [facility]   9/8/2019  3:00 PM [facility] FT CHAIR 2 [facility]     Recent Results (from the past 12 hour(s))   CBC WITH AUTOMATED DIFF    Collection Time: 08/19/19  3:07 PM   Result Value Ref Range    WBC 7.7 4.1 - 11.1 K/uL    RBC 4.67 4.10 - 5.70 M/uL    HGB 13.4 12.1 - 17.0 g/dL    HCT 40.9 36.6 - 50.3 %    MCV 87.6 80.0 - 99.0 FL    MCH 28.7 26.0 - 34.0 PG    MCHC 32.8 30.0 - 36.5 g/dL    RDW 14.1 11.5 - 14.5 %    PLATELET 766 646 - 289 K/uL    MPV 10.7 8.9 - 12.9 FL    NRBC 0.0 0  WBC    ABSOLUTE NRBC 0.00 0.00 - 0.01 K/uL    NEUTROPHILS 53 32 - 75 %    LYMPHOCYTES 25 12 - 49 %    MONOCYTES 13 5 - 13 %    EOSINOPHILS 7 0 - 7 %    BASOPHILS 1 0 - 1 %    IMMATURE GRANULOCYTES 1 (H) 0.0 - 0.5 %    ABS. NEUTROPHILS 4.1 1.8 - 8.0 K/UL    ABS. LYMPHOCYTES 1.9 0.8 - 3.5 K/UL    ABS. MONOCYTES 1.0 0.0 - 1.0 K/UL    ABS. EOSINOPHILS 0.5 (H) 0.0 - 0.4 K/UL    ABS. BASOPHILS 0.1 0.0 - 0.1 K/UL    ABS. IMM. GRANS. 0.1 (H) 0.00 - 0.04 K/UL    DF AUTOMATED     METABOLIC PANEL, BASIC    Collection Time: 08/19/19  3:07 PM   Result Value Ref Range    Sodium 140 136 - 145 mmol/L    Potassium 3.7 3.5 - 5.1 mmol/L    Chloride 106 97 - 108 mmol/L    CO2 26 21 - 32 mmol/L    Anion gap 8 5 - 15 mmol/L    Glucose 104 (H) 65 - 100 mg/dL    BUN 18 6 - 20 MG/DL    Creatinine 1.81 (H) 0.70 - 1.30 MG/DL    BUN/Creatinine ratio 10 (L) 12 - 20      GFR est AA 46 (L) >60 ml/min/1.73m2    GFR est non-AA 38 (L) >60 ml/min/1.73m2    Calcium 8.9 8.5 - 10.1 MG/DL   VANCOMYCIN, TROUGH    Collection Time: 08/19/19  3:07 PM   Result Value Ref Range    Vancomycin,trough 14.0 (H) 5.0 - 10.0 ug/mL    Reported dose date: NOT PROVIDED      Reported dose time: NOT PROVIDED      Reported dose: NOT PROVIDED UNITS No

## 2021-01-02 ENCOUNTER — HOSPITAL ENCOUNTER (OUTPATIENT)
Dept: INFUSION THERAPY | Age: 66
Discharge: HOME | End: 2021-01-02
Payer: OTHER GOVERNMENT

## 2021-01-02 VITALS
TEMPERATURE: 97.1 F | SYSTOLIC BLOOD PRESSURE: 133 MMHG | DIASTOLIC BLOOD PRESSURE: 76 MMHG | HEART RATE: 82 BPM | RESPIRATION RATE: 18 BRPM

## 2021-01-02 PROCEDURE — 74011250636 HC RX REV CODE- 250/636: Performed by: NURSE PRACTITIONER

## 2021-01-02 PROCEDURE — 96375 TX/PRO/DX INJ NEW DRUG ADDON: CPT

## 2021-01-02 PROCEDURE — 74011250636 HC RX REV CODE- 250/636: Performed by: INTERNAL MEDICINE

## 2021-01-02 PROCEDURE — 96365 THER/PROPH/DIAG IV INF INIT: CPT

## 2021-01-02 PROCEDURE — 74011000250 HC RX REV CODE- 250: Performed by: NURSE PRACTITIONER

## 2021-01-02 PROCEDURE — 74011000258 HC RX REV CODE- 258: Performed by: NURSE PRACTITIONER

## 2021-01-02 RX ORDER — SODIUM CHLORIDE 0.9 % (FLUSH) 0.9 %
5-10 SYRINGE (ML) INJECTION AS NEEDED
Status: DISCONTINUED | OUTPATIENT
Start: 2021-01-02 | End: 2021-01-03 | Stop reason: HOSPADM

## 2021-01-02 RX ORDER — HEPARIN 100 UNIT/ML
500 SYRINGE INTRAVENOUS AS NEEDED
Status: DISCONTINUED | OUTPATIENT
Start: 2021-01-02 | End: 2021-01-03 | Stop reason: HOSPADM

## 2021-01-02 RX ADMIN — Medication 10 ML: at 11:39

## 2021-01-02 RX ADMIN — HEPARIN 500 UNITS: 100 SYRINGE at 11:40

## 2021-01-02 RX ADMIN — SODIUM CHLORIDE 1 G: 900 INJECTION INTRAVENOUS at 11:00

## 2021-01-02 RX ADMIN — DAPTOMYCIN 800 MG: 500 INJECTION, POWDER, LYOPHILIZED, FOR SOLUTION INTRAVENOUS at 11:33

## 2021-01-02 RX ADMIN — HEPARIN 500 UNITS: 100 SYRINGE at 11:39

## 2021-01-02 RX ADMIN — Medication 10 ML: at 11:40

## 2021-01-02 NOTE — PROGRESS NOTES
OPIC Short Note                       Date: January 2, 2021        1055 Pt admit to [facility] for Daily Antibiotics ambulatory in stable condition. Assessment completed. No new concerns voiced. PICC flushed with positive blood return. Patient denies any symptoms of COVID-19, including SOB, coughing, fever, or generally not feeling well. Patient denies any recent exposure to COVID-19. Patient denies any recent contact with family or friends that have a pending COVID-19 test.      Mr. Seema Bajwa vitals were reviewed prior to and after treatment. Patient Vitals for the past 12 hrs:   Temp Pulse Resp BP   01/02/21 1055 97.1 °F (36.2 °C) 82 18 133/76       Medications given:   Medications Administered     DAPTOmycin (CUBICIN) 800 mg in sterile water (preservative free) 16 mL IV syringe RF formulation     Admin Date  01/02/2021 Action  Given Dose  800 mg Route  IntraVENous Administered By  Jones Childs          ertapenem [facility]) 1 g in 0.9% sodium chloride (MBP/ADV) 50 mL MBP     Admin Date  01/02/2021 Action  New Bag Dose  1 g Rate  100 mL/hr Route  IntraVENous Administered By  Jones Childs          heparin (porcine) pf 500 Units     Admin Date  01/02/2021 Action  Given Dose  500 Units Route  IntraVENous Administered By  Kailee Raw Date  01/02/2021 Action  Given Dose  500 Units Route  IntraVENous Administered By  Jones Childs          sodium chloride (NS) flush 5-10 mL     Admin Date  01/02/2021 Action  Given Dose  10 mL Route  IntraVENous Administered By  Kailee Raw Date  01/02/2021 Action  Given Dose  10 mL Route  IntraVENous Administered By  Jonesfrancois Childs                Mr. Gabriella Martinez tolerated the infusion, and had no complaints. DL PICC flushed, heparinized, and capped per protocol. Mr. Gabriella Martinez was discharged from [place] [address] in stable condition at 1140.      Future Appointments   Date Time Provider Scott Hartman   1/3/2021 10:00 AM H2 CINDY FASTRACK [facility]   1/4/2021  5:00 PM H2 CINDY FASTRACK [facility]   1/5/2021  5:00 PM H2 CINDY FASTRACK [facility]   1/6/2021  5:00 PM H2 CINDY FASTRACK [facility]   1/7/2021  5:00 PM H2 [facility]INDY FASTRACK [facility]   1/8/2021  5:00 PM H2 [facility]INDY [facility]ACK [facility]       Flaca Godinez RN  January 2, 2021

## 2021-01-03 ENCOUNTER — HOSPITAL ENCOUNTER (OUTPATIENT)
Dept: INFUSION THERAPY | Age: 66
Discharge: HOME | End: 2021-01-03
Payer: OTHER GOVERNMENT

## 2021-01-03 VITALS
DIASTOLIC BLOOD PRESSURE: 82 MMHG | SYSTOLIC BLOOD PRESSURE: 127 MMHG | TEMPERATURE: 98.7 F | HEART RATE: 88 BPM | RESPIRATION RATE: 18 BRPM

## 2021-01-03 PROCEDURE — 74011250636 HC RX REV CODE- 250/636: Performed by: NURSE PRACTITIONER

## 2021-01-03 PROCEDURE — 74011000258 HC RX REV CODE- 258: Performed by: NURSE PRACTITIONER

## 2021-01-03 PROCEDURE — 96365 THER/PROPH/DIAG IV INF INIT: CPT

## 2021-01-03 PROCEDURE — 74011000250 HC RX REV CODE- 250: Performed by: NURSE PRACTITIONER

## 2021-01-03 PROCEDURE — 96375 TX/PRO/DX INJ NEW DRUG ADDON: CPT

## 2021-01-03 RX ADMIN — DAPTOMYCIN 800 MG: 500 INJECTION, POWDER, LYOPHILIZED, FOR SOLUTION INTRAVENOUS at 10:46

## 2021-01-03 RX ADMIN — SODIUM CHLORIDE 1 G: 900 INJECTION INTRAVENOUS at 10:14

## 2021-01-03 NOTE — PROGRESS NOTES
[address] [facility] @ [facility] Visit Note    1000 Patient arrives for Antibiotic without acute problems. Please see [facility] for complete assessment and education provided. Prior to treatment, patient was screened for COVID 19. Patient denies any signs or symptoms of COVID. Denies any known physical contact with anyone diagnosed with, or with pending or positive COVID test. Denies a pending or positive COVID test himself. Vital signs stable throughout and prior to discharge. Patient tolerated procedure well and was discharged without incident. Patient is aware of next hospitals appointment on 01/04/2021 Appointment card give to the Patient    Medications verified by Ramakrishna Guillaume RN via [facility] [facility]:    1. Ertapenem 1 gm  2.    Daptomycin 800 mg    Vital signs:   Patient Vitals for the past 12 hrs:   Temp Pulse Resp BP   01/03/21 1004 98.7 °F (37.1 °C) 88 18 127/82

## 2021-01-04 ENCOUNTER — HOSPITAL ENCOUNTER (OUTPATIENT)
Dept: INFUSION THERAPY | Age: 66
Discharge: HOME | End: 2021-01-04
Payer: OTHER GOVERNMENT

## 2021-01-04 VITALS
TEMPERATURE: 96.8 F | DIASTOLIC BLOOD PRESSURE: 79 MMHG | SYSTOLIC BLOOD PRESSURE: 111 MMHG | RESPIRATION RATE: 18 BRPM | HEART RATE: 93 BPM

## 2021-01-04 PROCEDURE — 74011000258 HC RX REV CODE- 258: Performed by: NURSE PRACTITIONER

## 2021-01-04 PROCEDURE — 74011000250 HC RX REV CODE- 250: Performed by: NURSE PRACTITIONER

## 2021-01-04 PROCEDURE — 96375 TX/PRO/DX INJ NEW DRUG ADDON: CPT

## 2021-01-04 PROCEDURE — 74011250636 HC RX REV CODE- 250/636: Performed by: NURSE PRACTITIONER

## 2021-01-04 PROCEDURE — 96365 THER/PROPH/DIAG IV INF INIT: CPT

## 2021-01-04 RX ADMIN — SODIUM CHLORIDE 1 G: 900 INJECTION INTRAVENOUS at 16:48

## 2021-01-04 RX ADMIN — DAPTOMYCIN 800 MG: 500 INJECTION, POWDER, LYOPHILIZED, FOR SOLUTION INTRAVENOUS at 16:53

## 2021-01-04 NOTE — PROGRESS NOTES
Outpatient Infusion Center Progress Note    0004 Pt admit to [facility] for Daptomycin and ertapenem ambulatory in stable condition. Assessment completed. No new concerns voiced. Patient denies all covid exposure, denies, sob, cough, fever and general feelings of being un well. Double lumen PICC line with good blood return to both ports, flushed and capped per protocol at the end of infusion    Visit Vitals  /79 (BP 1 Location: Right arm)   Pulse 93   Temp 96.8 °F (36 °C)   Resp 18       Medications:  Medications Administered     DAPTOmycin (CUBICIN) 800 mg in sterile water (preservative free) 16 mL IV syringe RF formulation     Admin Date  01/04/2021 Action  Given Dose  800 mg Route  IntraVENous Administered By  Aidee Dennis RN          ertapenem [facility]) 1 g in 0.9% sodium chloride (MBP/ADV) 50 mL MBP     Admin Date  01/04/2021 Action  New Bag Dose  1 g Rate  100 mL/hr Route  IntraVENous Administered By  Aidee Dennis, GABRIEL                2566 Pt tolerated treatment well. D/c home ambulatory in no distress. Pt aware of next appointment scheduled for 1/5/21.

## 2021-01-05 ENCOUNTER — HOSPITAL ENCOUNTER (OUTPATIENT)
Dept: INFUSION THERAPY | Age: 66
Discharge: HOME | End: 2021-01-05
Payer: OTHER GOVERNMENT

## 2021-01-05 VITALS
SYSTOLIC BLOOD PRESSURE: 129 MMHG | HEART RATE: 80 BPM | DIASTOLIC BLOOD PRESSURE: 77 MMHG | RESPIRATION RATE: 16 BRPM | TEMPERATURE: 96.6 F

## 2021-01-05 PROCEDURE — 74011250636 HC RX REV CODE- 250/636: Performed by: NURSE PRACTITIONER

## 2021-01-05 PROCEDURE — 74011000258 HC RX REV CODE- 258: Performed by: NURSE PRACTITIONER

## 2021-01-05 PROCEDURE — 96365 THER/PROPH/DIAG IV INF INIT: CPT

## 2021-01-05 PROCEDURE — 74011000250 HC RX REV CODE- 250: Performed by: NURSE PRACTITIONER

## 2021-01-05 PROCEDURE — 77030020847 HC STATLOK BARD -A

## 2021-01-05 RX ADMIN — DAPTOMYCIN 800 MG: 500 INJECTION, POWDER, LYOPHILIZED, FOR SOLUTION INTRAVENOUS at 18:05

## 2021-01-05 RX ADMIN — SODIUM CHLORIDE 1 G: 900 INJECTION INTRAVENOUS at 17:25

## 2021-01-05 NOTE — PROGRESS NOTES
Outpatient Infusion Center Progress Note    Patient denies SOB, fever, cough, and/or general not feeling well. Patient denies recent exposure to someone who has tested positive for COVID-19. Patient denies contact with anyone who has a pending COVID test.     1710 Patient admit to infusion center ambulatory in no distress for daily antibiotics. No changes since last visit. PICC flushed with good blood return. Visit Vitals  /77 (BP 1 Location: Right arm, BP Patient Position: Sitting)   Pulse 80   Temp (!) 96.6 °F (35.9 °C)   Resp 16     Medications Administered     DAPTOmycin (CUBICIN) 800 mg in sterile water (preservative free) 16 mL IV syringe RF formulation     Admin Date  01/05/2021 Action  Given Dose  800 mg Route  IntraVENous Administered By  Steffan Holstein, RN          ertapenem [facility]) 1 g in 0.9% sodium chloride (MBP/ADV) 50 mL MBP     Admin Date  01/05/2021 Action  New Bag Dose  1 g Rate  100 mL/hr Route  IntraVENous Administered By  Steffan Holstein, RN              7521 Patient tolerated treatment well. PICC flushed following infusion. PICC dressing changed per policy. A dressing change was performed on Thais Crespo for his PICC line. The present dressing was removed carefully to minimize trauma and to prevent accidental dislodgement of the catheter. The skin and catheter site were inspected for signs of infection, leakage, or other mechanical problems. All documentation for the dressing change and condition of the insertion site was completed on the Doc Flowsheet in [address] [place] [address]. Patient has no questions or concerns at this time. D/C ambulatory home in no distress.      Future Appointments   Date Time Provider Scott Hartman   1/6/2021  5:00 PM H2 [facility]INDY [facility]STRACK [facility]   1/7/2021  5:00 PM H2 [facility]INDY [facility]   1/8/2021  5:00 PM H2 [facility]INDY [facility]   1/9/2021 10:00 AM H2 [facility]INDY [facility]   1/10/2021 11:00 AM H2 [facility]INDY [facility]   1/11/2021  5:00 PM H2 [facility]Y [facility]   1/12/2021  5:00 PM H2 [facility]Y [facility]   1/13/2021  5:00 PM H2 [facility]INDY [facility]   1/14/2021  5:00 PM H2 [facility]Y [facility]   1/15/2021  5:00 PM H2 [facility]Y [facility]   1/16/2021  9:00 AM H2 [facility]Y [facility]         Princess Sandoval RN

## 2021-01-06 ENCOUNTER — HOSPITAL ENCOUNTER (OUTPATIENT)
Dept: INFUSION THERAPY | Age: 66
Discharge: HOME | End: 2021-01-06
Payer: OTHER GOVERNMENT

## 2021-01-06 VITALS
HEART RATE: 87 BPM | DIASTOLIC BLOOD PRESSURE: 78 MMHG | TEMPERATURE: 96.9 F | SYSTOLIC BLOOD PRESSURE: 122 MMHG | RESPIRATION RATE: 17 BRPM

## 2021-01-06 LAB
ALBUMIN SERPL-MCNC: 2.9 G/DL (ref 3.5–5)
ALBUMIN/GLOB SERPL: 0.7 {RATIO} (ref 1.1–2.2)
ALP SERPL-CCNC: 37 U/L (ref 45–117)
ALT SERPL-CCNC: 30 U/L (ref 12–78)
ANION GAP SERPL CALC-SCNC: 6 MMOL/L (ref 5–15)
AST SERPL-CCNC: 23 U/L (ref 15–37)
BASOPHILS # BLD: 0.1 K/UL (ref 0–0.1)
BASOPHILS NFR BLD: 1 % (ref 0–1)
BILIRUB SERPL-MCNC: 0.3 MG/DL (ref 0.2–1)
BUN SERPL-MCNC: 20 MG/DL (ref 6–20)
BUN/CREAT SERPL: 13 (ref 12–20)
CALCIUM SERPL-MCNC: 8 MG/DL (ref 8.5–10.1)
CHLORIDE SERPL-SCNC: 115 MMOL/L (ref 97–108)
CK SERPL-CCNC: 79 U/L (ref 39–308)
CO2 SERPL-SCNC: 24 MMOL/L (ref 21–32)
CREAT SERPL-MCNC: 1.59 MG/DL (ref 0.7–1.3)
DIFFERENTIAL METHOD BLD: ABNORMAL
EOSINOPHIL # BLD: 0.3 K/UL (ref 0–0.4)
EOSINOPHIL NFR BLD: 5 % (ref 0–7)
ERYTHROCYTE [DISTWIDTH] IN BLOOD BY AUTOMATED COUNT: 13.6 % (ref 11.5–14.5)
GLOBULIN SER CALC-MCNC: 3.9 G/DL (ref 2–4)
GLUCOSE SERPL-MCNC: 93 MG/DL (ref 65–100)
HCT VFR BLD AUTO: 34.1 % (ref 36.6–50.3)
HGB BLD-MCNC: 11.3 G/DL (ref 12.1–17)
IMM GRANULOCYTES # BLD AUTO: 0 K/UL (ref 0–0.04)
IMM GRANULOCYTES NFR BLD AUTO: 1 % (ref 0–0.5)
LYMPHOCYTES # BLD: 1.8 K/UL (ref 0.8–3.5)
LYMPHOCYTES NFR BLD: 30 % (ref 12–49)
MCH RBC QN AUTO: 29 PG (ref 26–34)
MCHC RBC AUTO-ENTMCNC: 33.1 G/DL (ref 30–36.5)
MCV RBC AUTO: 87.7 FL (ref 80–99)
MONOCYTES # BLD: 0.7 K/UL (ref 0–1)
MONOCYTES NFR BLD: 11 % (ref 5–13)
NEUTS SEG # BLD: 3.2 K/UL (ref 1.8–8)
NEUTS SEG NFR BLD: 52 % (ref 32–75)
NRBC # BLD: 0 K/UL (ref 0–0.01)
NRBC BLD-RTO: 0 PER 100 WBC
PLATELET # BLD AUTO: 284 K/UL (ref 150–400)
PMV BLD AUTO: 10.7 FL (ref 8.9–12.9)
POTASSIUM SERPL-SCNC: 3.4 MMOL/L (ref 3.5–5.1)
PROT SERPL-MCNC: 6.8 G/DL (ref 6.4–8.2)
RBC # BLD AUTO: 3.89 M/UL (ref 4.1–5.7)
SODIUM SERPL-SCNC: 145 MMOL/L (ref 136–145)
WBC # BLD AUTO: 6.1 K/UL (ref 4.1–11.1)

## 2021-01-06 PROCEDURE — 74011250636 HC RX REV CODE- 250/636: Performed by: NURSE PRACTITIONER

## 2021-01-06 PROCEDURE — 85025 COMPLETE CBC W/AUTO DIFF WBC: CPT

## 2021-01-06 PROCEDURE — 96365 THER/PROPH/DIAG IV INF INIT: CPT

## 2021-01-06 PROCEDURE — 74011000250 HC RX REV CODE- 250: Performed by: NURSE PRACTITIONER

## 2021-01-06 PROCEDURE — 80053 COMPREHEN METABOLIC PANEL: CPT

## 2021-01-06 PROCEDURE — 36415 COLL VENOUS BLD VENIPUNCTURE: CPT

## 2021-01-06 PROCEDURE — 96375 TX/PRO/DX INJ NEW DRUG ADDON: CPT

## 2021-01-06 PROCEDURE — 74011000258 HC RX REV CODE- 258: Performed by: NURSE PRACTITIONER

## 2021-01-06 PROCEDURE — 82550 ASSAY OF CK (CPK): CPT

## 2021-01-06 PROCEDURE — 36592 COLLECT BLOOD FROM PICC: CPT

## 2021-01-06 RX ADMIN — DAPTOMYCIN 800 MG: 500 INJECTION, POWDER, LYOPHILIZED, FOR SOLUTION INTRAVENOUS at 17:18

## 2021-01-06 RX ADMIN — SODIUM CHLORIDE 1 G: 900 INJECTION INTRAVENOUS at 17:10

## 2021-01-06 NOTE — PROGRESS NOTES
Outpatient Infusion Center Progress Note    1700 Pt admit to [facility] for Daily daptomycin and ertapenem ambulatory in stable condition. Assessment completed. No new concerns voiced 2 lumen picc line with good blood return to both ports, labs drawn and sent for processing, maintained good blood return throughout infusion, flushed per protocol at the end of infusion. Patient denies any exposure to COVID, denies fever, sob, cough and feeling un well    Visit Vitals  /78   Pulse 87   Temp 96.9 °F (36.1 °C)   Resp 17       Medications:  Medications Administered     DAPTOmycin (CUBICIN) 800 mg in sterile water (preservative free) 16 mL IV syringe RF formulation     Admin Date  01/06/2021 Action  Given Dose  800 mg Route  IntraVENous Administered By  Priyanka Kemp RN          ertapenem [facility]) 1 g in 0.9% sodium chloride (MBP/ADV) 50 mL MBP     Admin Date  01/06/2021 Action  New Bag Dose  1 g Rate  100 mL/hr Route  IntraVENous Administered By  Priyanka Kemp RN                1750 Pt tolerated treatment well. D/c home ambulatory in no distress. Pt aware of next appointment scheduled for 1/7/21. Recent Results (from the past 12 hour(s))   CBC WITH AUTOMATED DIFF    Collection Time: 01/06/21  5:14 PM   Result Value Ref Range    WBC 6.1 4.1 - 11.1 K/uL    RBC 3.89 (L) 4.10 - 5.70 M/uL    HGB 11.3 (L) 12.1 - 17.0 g/dL    HCT 34.1 (L) 36.6 - 50.3 %    MCV 87.7 80.0 - 99.0 FL    MCH 29.0 26.0 - 34.0 PG    MCHC 33.1 30.0 - 36.5 g/dL    RDW 13.6 11.5 - 14.5 %    PLATELET 237 798 - 230 K/uL    MPV 10.7 8.9 - 12.9 FL    NRBC 0.0 0  WBC    ABSOLUTE NRBC 0.00 0.00 - 0.01 K/uL    NEUTROPHILS 52 32 - 75 %    LYMPHOCYTES 30 12 - 49 %    MONOCYTES 11 5 - 13 %    EOSINOPHILS 5 0 - 7 %    BASOPHILS 1 0 - 1 %    IMMATURE GRANULOCYTES 1 (H) 0.0 - 0.5 %    ABS. NEUTROPHILS 3.2 1.8 - 8.0 K/UL    ABS. LYMPHOCYTES 1.8 0.8 - 3.5 K/UL    ABS. MONOCYTES 0.7 0.0 - 1.0 K/UL    ABS. EOSINOPHILS 0.3 0.0 - 0.4 K/UL    ABS. BASOPHILS 0.1 0.0 - 0.1 K/UL    ABS. IMM.  GRANS. 0.0 0.00 - 0.04 K/UL    DF AUTOMATED

## 2021-01-07 ENCOUNTER — HOSPITAL ENCOUNTER (OUTPATIENT)
Dept: INFUSION THERAPY | Age: 66
Discharge: HOME | End: 2021-01-07
Payer: OTHER GOVERNMENT

## 2021-01-07 VITALS
HEART RATE: 81 BPM | TEMPERATURE: 97 F | RESPIRATION RATE: 18 BRPM | DIASTOLIC BLOOD PRESSURE: 77 MMHG | SYSTOLIC BLOOD PRESSURE: 120 MMHG

## 2021-01-07 PROCEDURE — 74011250636 HC RX REV CODE- 250/636: Performed by: NURSE PRACTITIONER

## 2021-01-07 PROCEDURE — 74011000258 HC RX REV CODE- 258: Performed by: NURSE PRACTITIONER

## 2021-01-07 PROCEDURE — 96375 TX/PRO/DX INJ NEW DRUG ADDON: CPT

## 2021-01-07 PROCEDURE — 74011000250 HC RX REV CODE- 250: Performed by: NURSE PRACTITIONER

## 2021-01-07 PROCEDURE — 96365 THER/PROPH/DIAG IV INF INIT: CPT

## 2021-01-07 RX ORDER — HEPARIN 100 UNIT/ML
500 SYRINGE INTRAVENOUS AS NEEDED
Status: DISCONTINUED | OUTPATIENT
Start: 2021-01-07 | End: 2021-01-08 | Stop reason: HOSPADM

## 2021-01-07 RX ORDER — SODIUM CHLORIDE 0.9 % (FLUSH) 0.9 %
5-10 SYRINGE (ML) INJECTION AS NEEDED
Status: DISCONTINUED | OUTPATIENT
Start: 2021-01-07 | End: 2021-01-08 | Stop reason: HOSPADM

## 2021-01-07 RX ADMIN — Medication 10 ML: at 18:03

## 2021-01-07 RX ADMIN — HEPARIN 500 UNITS: 100 SYRINGE at 18:03

## 2021-01-07 RX ADMIN — DAPTOMYCIN 800 MG: 500 INJECTION, POWDER, LYOPHILIZED, FOR SOLUTION INTRAVENOUS at 18:04

## 2021-01-07 RX ADMIN — Medication 10 ML: at 18:00

## 2021-01-07 RX ADMIN — HEPARIN 500 UNITS: 100 SYRINGE at 18:00

## 2021-01-07 RX ADMIN — SODIUM CHLORIDE 1 G: 900 INJECTION INTRAVENOUS at 17:22

## 2021-01-07 NOTE — PROGRESS NOTES
Outpatient Infusion Center Progress Note    7450 Pt admit to [facility] for Daptomycin and ertapenem ambulatory in stable condition. Assessment completed. No new concerns voiced. Patient denies all covid exposure, denies, sob, cough, fever and general feelings of being un well. Double lumen PICC line with good blood return to both ports, flushed and capped per protocol at the end of infusion    Visit Vitals  /77 (BP 1 Location: Right arm, BP Patient Position: At rest)   Pulse 81   Temp 97 °F (36.1 °C)   Resp 18       Medications:  Medications Administered     DAPTOmycin (CUBICIN) 800 mg in sterile water (preservative free) 16 mL IV syringe RF formulation     Admin Date  01/07/2021 Action  Given Dose  800 mg Route  IntraVENous Administered By  Juani Myles RN          ertapenem [facility]) 1 g in 0.9% sodium chloride (MBP/ADV) 50 mL MBP     Admin Date  01/07/2021 Action  New Bag Dose  1 g Rate  100 mL/hr Route  IntraVENous Administered By  Juani Myles RN          heparin (porcine) pf 500 Units     Admin Date  01/07/2021 Action  Given Dose  500 Units Route  IntraVENous Administered By  Juani Myles RN           Admin Date  01/07/2021 Action  Given Dose  500 Units Route  IntraVENous Administered By  Juani Myles RN          sodium chloride (NS) flush 5-10 mL     Admin Date  01/07/2021 Action  Given Dose  10 mL Route  IntraVENous Administered By  Juani Myles RN           Admin Date  01/07/2021 Action  Given Dose  10 mL Route  IntraVENous Administered By  Juani Myles RN                      1800 Pt tolerated treatment well. D/c home ambulatory in no distress. Pt aware of next appointment scheduled for 1/8/21.

## 2021-01-08 ENCOUNTER — HOSPITAL ENCOUNTER (OUTPATIENT)
Dept: INFUSION THERAPY | Age: 66
Discharge: HOME | End: 2021-01-08
Payer: OTHER GOVERNMENT

## 2021-01-08 VITALS
TEMPERATURE: 98 F | HEART RATE: 94 BPM | SYSTOLIC BLOOD PRESSURE: 120 MMHG | DIASTOLIC BLOOD PRESSURE: 76 MMHG | RESPIRATION RATE: 18 BRPM

## 2021-01-08 PROCEDURE — 96375 TX/PRO/DX INJ NEW DRUG ADDON: CPT

## 2021-01-08 PROCEDURE — 96365 THER/PROPH/DIAG IV INF INIT: CPT

## 2021-01-08 PROCEDURE — 74011250636 HC RX REV CODE- 250/636: Performed by: NURSE PRACTITIONER

## 2021-01-08 PROCEDURE — 74011000258 HC RX REV CODE- 258: Performed by: NURSE PRACTITIONER

## 2021-01-08 PROCEDURE — 74011000250 HC RX REV CODE- 250: Performed by: NURSE PRACTITIONER

## 2021-01-08 RX ADMIN — DAPTOMYCIN 800 MG: 500 INJECTION, POWDER, LYOPHILIZED, FOR SOLUTION INTRAVENOUS at 17:11

## 2021-01-08 RX ADMIN — SODIUM CHLORIDE 1 G: 900 INJECTION INTRAVENOUS at 17:09

## 2021-01-08 NOTE — PROGRESS NOTES
Outpatient Infusion Center Progress Note    1700 Pt admit to [facility] for Daily ertapenem and Daptomycin ambulatory in stable condition. Assessment completed. No new concerns voiced. 2 lumen Picc line with good blood return to both ports, maintained good blood return throughout infusion, flushed and capped per protocol. Patient denies any Covid contacts, denies cough, fever, sob, and feeling un well. Visit Vitals  /76 (BP 1 Location: Right arm)   Pulse 94   Temp 98 °F (36.7 °C)   Resp 18       Medications:  Medications Administered     DAPTOmycin (CUBICIN) 800 mg in sterile water (preservative free) 16 mL IV syringe RF formulation     Admin Date  01/08/2021 Action  Given Dose  800 mg Route  IntraVENous Administered By  Palak Winchester RN          ertapenem [facility]) 1 g in 0.9% sodium chloride (MBP/ADV) 50 mL MBP     Admin Date  01/08/2021 Action  New Bag Dose  1 g Rate  100 mL/hr Route  IntraVENous Administered By  Palak Winchester, RN                6395 Pt tolerated treatment well. D/c home ambulatory in no distress. Pt aware of next appointment scheduled for 1/9/21.

## 2021-01-09 ENCOUNTER — HOSPITAL ENCOUNTER (OUTPATIENT)
Dept: INFUSION THERAPY | Age: 66
Discharge: HOME | End: 2021-01-09
Payer: OTHER GOVERNMENT

## 2021-01-09 VITALS
RESPIRATION RATE: 18 BRPM | TEMPERATURE: 96.9 F | SYSTOLIC BLOOD PRESSURE: 118 MMHG | HEART RATE: 87 BPM | DIASTOLIC BLOOD PRESSURE: 78 MMHG

## 2021-01-09 PROCEDURE — 74011250636 HC RX REV CODE- 250/636: Performed by: NURSE PRACTITIONER

## 2021-01-09 PROCEDURE — 74011000250 HC RX REV CODE- 250: Performed by: NURSE PRACTITIONER

## 2021-01-09 PROCEDURE — 96365 THER/PROPH/DIAG IV INF INIT: CPT

## 2021-01-09 PROCEDURE — 96375 TX/PRO/DX INJ NEW DRUG ADDON: CPT

## 2021-01-09 PROCEDURE — 74011000258 HC RX REV CODE- 258: Performed by: NURSE PRACTITIONER

## 2021-01-09 RX ORDER — SODIUM CHLORIDE 0.9 % (FLUSH) 0.9 %
5-10 SYRINGE (ML) INJECTION AS NEEDED
Status: DISCONTINUED | OUTPATIENT
Start: 2021-01-09 | End: 2021-01-10 | Stop reason: HOSPADM

## 2021-01-09 RX ORDER — HEPARIN 100 UNIT/ML
500 SYRINGE INTRAVENOUS AS NEEDED
Status: DISCONTINUED | OUTPATIENT
Start: 2021-01-09 | End: 2021-01-10 | Stop reason: HOSPADM

## 2021-01-09 RX ADMIN — Medication 10 ML: at 10:16

## 2021-01-09 RX ADMIN — DAPTOMYCIN 800 MG: 500 INJECTION, POWDER, LYOPHILIZED, FOR SOLUTION INTRAVENOUS at 10:10

## 2021-01-09 RX ADMIN — HEPARIN 500 UNITS: 100 SYRINGE at 10:16

## 2021-01-09 RX ADMIN — HEPARIN 500 UNITS: 100 SYRINGE at 10:15

## 2021-01-09 RX ADMIN — Medication 10 ML: at 10:15

## 2021-01-09 RX ADMIN — SODIUM CHLORIDE 1 G: 900 INJECTION INTRAVENOUS at 09:35

## 2021-01-09 NOTE — PROGRESS NOTES
OPIC Short Note                       Date: January 9, 2021        0930 Pt admit to [facility] for Daily Antibiotics ambulatory in stable condition. Assessment completed. No new concerns voiced. PICC flushed with positive blood return. Patient denies any symptoms of COVID-19, including SOB, coughing, fever, or generally not feeling well. Patient denies any recent exposure to COVID-19. Patient denies any recent contact with family or friends that have a pending COVID-19 test.    Mr. Lalo Allison vitals were reviewed prior to and after treatment. Patient Vitals for the past 12 hrs:   Temp Pulse Resp BP   01/09/21 0933 96.9 °F (36.1 °C) 87 18 118/78       Medications given:   Medications Administered     DAPTOmycin (CUBICIN) 800 mg in sterile water (preservative free) 16 mL IV syringe RF formulation     Admin Date  01/09/2021 Action  Given Dose  800 mg Route  IntraVENous Administered By  Savage Keto          ertapenem [facility]) 1 g in 0.9% sodium chloride (MBP/ADV) 50 mL MBP     Admin Date  01/09/2021 Action  New Bag Dose  1 g Rate  100 mL/hr Route  IntraVENous Administered By  Savage Keto          heparin (porcine) pf 500 Units     Admin Date  01/09/2021 Action  Given Dose  500 Units Route  IntraVENous Administered By  Teresa Postal Date  01/09/2021 Action  Given Dose  500 Units Route  IntraVENous Administered By  Savage Keto          sodium chloride (NS) flush 5-10 mL     Admin Date  01/09/2021 Action  Given Dose  10 mL Route  IntraVENous Administered By  Teresa Postal Date  01/09/2021 Action  Given Dose  10 mL Route  IntraVENous Administered By  Savage Keto                  Mr. Sascha Piper tolerated the infusion, and had no complaints. DL PICC flushed, heparinized, and capped. Mr. Sascha Piper was discharged from [place] [address] in stable condition at 1025.      Future Appointments   Date Time Provider Scott Hartman   1/9/2021 10:00 AM  [facility] [address]   1/10/2021 11:00 AM H2 CINDY FASTRACK [facility]   1/11/2021  5:00 PM H2 CINDY FASTRACK [facility]   1/12/2021  5:00 PM H2 [facility]INDY [facility]   1/13/2021  5:00 PM H2 [facility]INDY [facility]   1/14/2021  5:00 PM H2 [facility]INDY [facility]   1/15/2021  5:00 PM H2 [facility]Y [facility]   1/16/2021  9:00 AM H2 [facility]INDY [facility]       Kimberly Wolf RN  January 9, 2021

## 2021-01-10 ENCOUNTER — APPOINTMENT (OUTPATIENT)
Dept: INFUSION THERAPY | Age: 66
End: 2021-01-10
Payer: OTHER GOVERNMENT

## 2021-01-10 ENCOUNTER — HOSPITAL ENCOUNTER (OUTPATIENT)
Dept: INFUSION THERAPY | Age: 66
Discharge: HOME | End: 2021-01-10
Payer: OTHER GOVERNMENT

## 2021-01-10 VITALS
HEART RATE: 83 BPM | RESPIRATION RATE: 18 BRPM | SYSTOLIC BLOOD PRESSURE: 114 MMHG | TEMPERATURE: 96 F | DIASTOLIC BLOOD PRESSURE: 77 MMHG

## 2021-01-10 PROCEDURE — 96375 TX/PRO/DX INJ NEW DRUG ADDON: CPT

## 2021-01-10 PROCEDURE — 74011250636 HC RX REV CODE- 250/636: Performed by: NURSE PRACTITIONER

## 2021-01-10 PROCEDURE — 74011000258 HC RX REV CODE- 258: Performed by: NURSE PRACTITIONER

## 2021-01-10 PROCEDURE — 74011000250 HC RX REV CODE- 250: Performed by: NURSE PRACTITIONER

## 2021-01-10 PROCEDURE — 96365 THER/PROPH/DIAG IV INF INIT: CPT

## 2021-01-10 RX ORDER — SODIUM CHLORIDE 0.9 % (FLUSH) 0.9 %
5-10 SYRINGE (ML) INJECTION AS NEEDED
Status: DISCONTINUED | OUTPATIENT
Start: 2021-01-10 | End: 2021-01-11 | Stop reason: HOSPADM

## 2021-01-10 RX ORDER — HEPARIN 100 UNIT/ML
500 SYRINGE INTRAVENOUS AS NEEDED
Status: DISCONTINUED | OUTPATIENT
Start: 2021-01-10 | End: 2021-01-11 | Stop reason: HOSPADM

## 2021-01-10 RX ADMIN — DAPTOMYCIN 800 MG: 500 INJECTION, POWDER, LYOPHILIZED, FOR SOLUTION INTRAVENOUS at 11:45

## 2021-01-10 RX ADMIN — HEPARIN 500 UNITS: 100 SYRINGE at 11:48

## 2021-01-10 RX ADMIN — Medication 10 ML: at 11:49

## 2021-01-10 RX ADMIN — Medication 10 ML: at 11:48

## 2021-01-10 RX ADMIN — HEPARIN 500 UNITS: 100 SYRINGE at 11:49

## 2021-01-10 RX ADMIN — SODIUM CHLORIDE 1 G: 900 INJECTION INTRAVENOUS at 10:55

## 2021-01-10 NOTE — PROGRESS NOTES
OPIC Short Note                       Date: January 10, 2021        1050 Pt admit to [facility] for Daily Antibiotics ambulatory in stable condition. Assessment completed. No new concerns voiced. PICC flushed with positive blood return. Patient denies any symptoms of COVID-19, including SOB, coughing, fever, or generally not feeling well. Patient denies any recent exposure to COVID-19. Patient denies any recent contact with family or friends that have a pending COVID-19 test.    Mr. Jj Klein vitals were reviewed prior to and after treatment. Patient Vitals for the past 12 hrs:   Temp Pulse Resp BP   01/10/21 1053  83  114/77   01/10/21 1051 (!) 96 °F (35.6 °C) 84 18 123/81       Medications given:   Medications Administered     DAPTOmycin (CUBICIN) 800 mg in sterile water (preservative free) 16 mL IV syringe RF formulation     Admin Date  01/09/2021 Action  Given Dose  800 mg Route  IntraVENous Administered By  Rolando Pour          ertapenem [facility]) 1 g in 0.9% sodium chloride (MBP/ADV) 50 mL MBP     Admin Date  01/09/2021 Action  New Bag Dose  1 g Rate  100 mL/hr Route  IntraVENous Administered By  Rolando Pour          heparin (porcine) pf 500 Units     Admin Date  01/09/2021 Action  Given Dose  500 Units Route  IntraVENous Administered By  Jenny Opoka Date  01/09/2021 Action  Given Dose  500 Units Route  IntraVENous Administered By  Rolando Pour          sodium chloride (NS) flush 5-10 mL     Admin Date  01/09/2021 Action  Given Dose  10 mL Route  IntraVENous Administered By  Jenny Opoka Date  01/09/2021 Action  Given Dose  10 mL Route  IntraVENous Administered By  Rolando Pour                  Mr. Savage Anand tolerated the infusion, and had no complaints. DL PICC flushed, heparinized, and capped. Mr. Savage Anand was discharged from [place] [address] in stable condition at 1155.  Patient is aware of next scheduled [facility] appointment on 1/11/21 @ 5 pm.     Future Appointments   Date Time Provider Scott Hartman   1/11/2021  5:00 PM H2 [facility]INDY [facility]   1/12/2021  5:00 PM H2 [facility]INDY [facility]   1/13/2021  5:00 PM H2 [facility]Y [facility]   1/14/2021  5:00 PM H2 [facility]Y [facility]   1/15/2021  5:00 PM H2 [facility]Y [facility]   1/16/2021  9:00 AM H2 [facility]INDY FASTRACK [facility] [address]       Josue Caba RN  January 10, 2021

## 2021-01-11 ENCOUNTER — HOSPITAL ENCOUNTER (OUTPATIENT)
Dept: INFUSION THERAPY | Age: 66
Discharge: HOME | End: 2021-01-11
Payer: OTHER GOVERNMENT

## 2021-01-11 VITALS
SYSTOLIC BLOOD PRESSURE: 129 MMHG | RESPIRATION RATE: 18 BRPM | TEMPERATURE: 96.9 F | HEART RATE: 98 BPM | DIASTOLIC BLOOD PRESSURE: 77 MMHG

## 2021-01-11 PROCEDURE — 74011000250 HC RX REV CODE- 250: Performed by: NURSE PRACTITIONER

## 2021-01-11 PROCEDURE — 74011000258 HC RX REV CODE- 258: Performed by: NURSE PRACTITIONER

## 2021-01-11 PROCEDURE — 96375 TX/PRO/DX INJ NEW DRUG ADDON: CPT

## 2021-01-11 PROCEDURE — 74011250636 HC RX REV CODE- 250/636: Performed by: NURSE PRACTITIONER

## 2021-01-11 PROCEDURE — 74011250636 HC RX REV CODE- 250/636

## 2021-01-11 PROCEDURE — 96365 THER/PROPH/DIAG IV INF INIT: CPT

## 2021-01-11 RX ORDER — SODIUM CHLORIDE 0.9 % (FLUSH) 0.9 %
5-10 SYRINGE (ML) INJECTION AS NEEDED
Status: DISCONTINUED | OUTPATIENT
Start: 2021-01-11 | End: 2021-01-12 | Stop reason: HOSPADM

## 2021-01-11 RX ORDER — HEPARIN 100 UNIT/ML
500 SYRINGE INTRAVENOUS AS NEEDED
Status: DISCONTINUED | OUTPATIENT
Start: 2021-01-11 | End: 2021-01-12 | Stop reason: HOSPADM

## 2021-01-11 RX ADMIN — Medication 10 ML: at 17:10

## 2021-01-11 RX ADMIN — DAPTOMYCIN 800 MG: 500 INJECTION, POWDER, LYOPHILIZED, FOR SOLUTION INTRAVENOUS at 17:56

## 2021-01-11 RX ADMIN — SODIUM CHLORIDE 1 G: 900 INJECTION INTRAVENOUS at 17:10

## 2021-01-11 RX ADMIN — HEPARIN 500 UNITS: 100 SYRINGE at 18:10

## 2021-01-11 RX ADMIN — Medication 10 ML: at 17:56

## 2021-01-11 NOTE — PROGRESS NOTES
[facility] Progress Note    Date: 2021    Name: Cirilo Burrell    MRN: [id number]         : 1955        1705: Pt arrived ambulatory to [facility] for daily Invanz/Daptomycin in stable condition. Assessment completed. No other complaints voiced at this time. PICC with positive blood return. Patient denies SOB, fever, cough, general not feeling well. Patient denies recent exposure to someone who has tested positive for COVID-19. Patient denies having contact with anyone who has a pending COVID test.      Patient Vitals for the past 12 hrs:   Temp Pulse Resp BP   21 1704 96.9 °F (36.1 °C) 98 18 129/77       Medications Administered     DAPTOmycin (CUBICIN) 800 mg in sterile water (preservative free) 16 mL IV syringe RF formulation     Admin Date  2021 Action  Given Dose  800 mg Route  IntraVENous Administered By  Benny Musa RN          ertapenem [facility]) 1 g in 0.9% sodium chloride (MBP/ADV) 50 mL MBP     Admin Date  2021 Action  New Bag Dose  1 g Rate  100 mL/hr Route  IntraVENous Administered By  Benny Musa RN          heparin (porcine) pf 500 Units     Admin Date  2021 Action  Given Dose  500 Units Route  IntraVENous Administered By  Benny Musa RN          sodium chloride (NS) flush 5-10 mL     Admin Date  2021 Action  Given Dose  10 mL Route  IntraVENous Administered By  Benny Musa RN           Admin Date  2021 Action  Given Dose  10 mL Route  IntraVENous Administered By  Benny Musa RN                      6379: Tolerated treatment well, no adverse reactions noted. PICC flushed and heparinized and curos caps placed on end claves. D/Cd from [facility] ambulatory and in no distress.       Future Appointments   Date Time Provider Scott Hartman   2021  5:00 PM H2 [facility]Y [facility]   2021  5:00 PM H2 [facility]Y [facility]   2021  5:00 PM H2 [facility]Y [facility]   1/15/2021  5:00 PM H2 CINDY [facility]   1/16/2021  9:00 AM H2 CINDY ANIKA NEW [address] Barbara Jorge RN  January 11, 2021

## 2021-01-12 ENCOUNTER — HOSPITAL ENCOUNTER (OUTPATIENT)
Dept: INFUSION THERAPY | Age: 66
Discharge: HOME | End: 2021-01-12
Payer: OTHER GOVERNMENT

## 2021-01-12 VITALS
TEMPERATURE: 97.3 F | RESPIRATION RATE: 18 BRPM | HEART RATE: 82 BPM | SYSTOLIC BLOOD PRESSURE: 118 MMHG | DIASTOLIC BLOOD PRESSURE: 78 MMHG

## 2021-01-12 PROCEDURE — 74011250636 HC RX REV CODE- 250/636: Performed by: NURSE PRACTITIONER

## 2021-01-12 PROCEDURE — 96365 THER/PROPH/DIAG IV INF INIT: CPT

## 2021-01-12 PROCEDURE — 74011000250 HC RX REV CODE- 250: Performed by: NURSE PRACTITIONER

## 2021-01-12 PROCEDURE — 96375 TX/PRO/DX INJ NEW DRUG ADDON: CPT

## 2021-01-12 PROCEDURE — 74011000258 HC RX REV CODE- 258: Performed by: NURSE PRACTITIONER

## 2021-01-12 PROCEDURE — 74011250636 HC RX REV CODE- 250/636

## 2021-01-12 PROCEDURE — 77030013169 SET IV BLD ICUM -A

## 2021-01-12 RX ORDER — HEPARIN 100 UNIT/ML
500 SYRINGE INTRAVENOUS AS NEEDED
Status: DISCONTINUED | OUTPATIENT
Start: 2021-01-12 | End: 2021-01-13 | Stop reason: HOSPADM

## 2021-01-12 RX ORDER — SODIUM CHLORIDE 0.9 % (FLUSH) 0.9 %
5-10 SYRINGE (ML) INJECTION AS NEEDED
Status: DISCONTINUED | OUTPATIENT
Start: 2021-01-12 | End: 2021-01-13 | Stop reason: HOSPADM

## 2021-01-12 RX ADMIN — SODIUM CHLORIDE 1 G: 900 INJECTION INTRAVENOUS at 14:14

## 2021-01-12 RX ADMIN — DAPTOMYCIN 800 MG: 500 INJECTION, POWDER, LYOPHILIZED, FOR SOLUTION INTRAVENOUS at 14:59

## 2021-01-12 RX ADMIN — HEPARIN 500 UNITS: 100 SYRINGE at 15:00

## 2021-01-12 RX ADMIN — Medication 10 ML: at 15:00

## 2021-01-12 NOTE — PROGRESS NOTES
Outpatient Infusion Center Progress Note    4123 Pt admit to [facility] for daily antibiotics ambulatory in stable condition. Assessment completed. No new concerns voiced. Double lumen PICC line in place with positive blood return in both ports. Dressing was changed today. Medications administered as ordered. Patient was screened for COVID 19. Patient denies any signs or symptoms of COVID at this time. He does reports that this morning he drove his daughter to get a COVID test. He is unsure why she was getting tested and she went to work today. Patient does not have any pending or positive tests at this time. Visit Vitals  /78 (BP 1 Location: Right arm, BP Patient Position: Sitting)   Pulse 82   Temp 97.3 °F (36.3 °C)   Resp 18       Medications Administered     DAPTOmycin (CUBICIN) 800 mg in sterile water (preservative free) 16 mL IV syringe RF formulation     Admin Date  01/12/2021 Action  Given Dose  800 mg Route  IntraVENous Administered By  Binh Rodriguez          ertapenem [facility]) 1 g in 0.9% sodium chloride (MBP/ADV) 50 mL MBP     Admin Date  01/12/2021 Action  New Bag Dose  1 g Rate  100 mL/hr Route  IntraVENous Administered By  Binh Rodriguez          heparin (porcine) pf 500 Units     Admin Date  01/12/2021 Action  Given Dose  500 Units Route  IntraVENous Administered By  [employer] Date  01/12/2021 Action  Given Dose  500 Units Route  IntraVENous Administered By  Binh Rodriguez          sodium chloride (NS) flush 5-10 mL     Admin Date  01/12/2021 Action  Given Dose  10 mL Route  IntraVENous Administered By  [employer] Date  01/12/2021 Action  Given Dose  10 mL Route  IntraVENous Administered By  [employer]                1500 Pt tolerated treatment well. D/c home ambulatory in no distress. Pt aware of next appointment scheduled.     Pauline Murphy RN

## 2021-01-13 ENCOUNTER — HOSPITAL ENCOUNTER (OUTPATIENT)
Dept: INFUSION THERAPY | Age: 66
Discharge: HOME | End: 2021-01-13
Payer: OTHER GOVERNMENT

## 2021-01-13 VITALS
HEART RATE: 89 BPM | TEMPERATURE: 97.5 F | RESPIRATION RATE: 18 BRPM | SYSTOLIC BLOOD PRESSURE: 114 MMHG | DIASTOLIC BLOOD PRESSURE: 77 MMHG

## 2021-01-13 LAB
ALBUMIN SERPL-MCNC: 3.6 G/DL (ref 3.5–5)
ALBUMIN/GLOB SERPL: 0.9 {RATIO} (ref 1.1–2.2)
ALP SERPL-CCNC: 40 U/L (ref 45–117)
ALT SERPL-CCNC: 27 U/L (ref 12–78)
ANION GAP SERPL CALC-SCNC: 6 MMOL/L (ref 5–15)
AST SERPL-CCNC: 23 U/L (ref 15–37)
BASOPHILS # BLD: 0.1 K/UL (ref 0–0.1)
BASOPHILS NFR BLD: 1 % (ref 0–1)
BILIRUB SERPL-MCNC: 0.4 MG/DL (ref 0.2–1)
BUN SERPL-MCNC: 26 MG/DL (ref 6–20)
BUN/CREAT SERPL: 12 (ref 12–20)
CALCIUM SERPL-MCNC: 9.5 MG/DL (ref 8.5–10.1)
CHLORIDE SERPL-SCNC: 107 MMOL/L (ref 97–108)
CK SERPL-CCNC: 78 U/L (ref 39–308)
CO2 SERPL-SCNC: 26 MMOL/L (ref 21–32)
CREAT SERPL-MCNC: 2.14 MG/DL (ref 0.7–1.3)
DIFFERENTIAL METHOD BLD: ABNORMAL
EOSINOPHIL # BLD: 0.6 K/UL (ref 0–0.4)
EOSINOPHIL NFR BLD: 8 % (ref 0–7)
ERYTHROCYTE [DISTWIDTH] IN BLOOD BY AUTOMATED COUNT: 14 % (ref 11.5–14.5)
GLOBULIN SER CALC-MCNC: 4 G/DL (ref 2–4)
GLUCOSE SERPL-MCNC: 90 MG/DL (ref 65–100)
HCT VFR BLD AUTO: 37.8 % (ref 36.6–50.3)
HGB BLD-MCNC: 12.3 G/DL (ref 12.1–17)
IMM GRANULOCYTES # BLD AUTO: 0 K/UL (ref 0–0.04)
IMM GRANULOCYTES NFR BLD AUTO: 0 % (ref 0–0.5)
LYMPHOCYTES # BLD: 1.6 K/UL (ref 0.8–3.5)
LYMPHOCYTES NFR BLD: 22 % (ref 12–49)
MCH RBC QN AUTO: 28.3 PG (ref 26–34)
MCHC RBC AUTO-ENTMCNC: 32.5 G/DL (ref 30–36.5)
MCV RBC AUTO: 86.9 FL (ref 80–99)
MONOCYTES # BLD: 0.9 K/UL (ref 0–1)
MONOCYTES NFR BLD: 12 % (ref 5–13)
NEUTS SEG # BLD: 4.3 K/UL (ref 1.8–8)
NEUTS SEG NFR BLD: 57 % (ref 32–75)
NRBC # BLD: 0 K/UL (ref 0–0.01)
NRBC BLD-RTO: 0 PER 100 WBC
PLATELET # BLD AUTO: 285 K/UL (ref 150–400)
PMV BLD AUTO: 10.7 FL (ref 8.9–12.9)
POTASSIUM SERPL-SCNC: 3.9 MMOL/L (ref 3.5–5.1)
PROT SERPL-MCNC: 7.6 G/DL (ref 6.4–8.2)
RBC # BLD AUTO: 4.35 M/UL (ref 4.1–5.7)
SODIUM SERPL-SCNC: 139 MMOL/L (ref 136–145)
WBC # BLD AUTO: 7.4 K/UL (ref 4.1–11.1)

## 2021-01-13 PROCEDURE — 74011000258 HC RX REV CODE- 258: Performed by: NURSE PRACTITIONER

## 2021-01-13 PROCEDURE — 74011250636 HC RX REV CODE- 250/636: Performed by: NURSE PRACTITIONER

## 2021-01-13 PROCEDURE — 80053 COMPREHEN METABOLIC PANEL: CPT

## 2021-01-13 PROCEDURE — 82550 ASSAY OF CK (CPK): CPT

## 2021-01-13 PROCEDURE — 36415 COLL VENOUS BLD VENIPUNCTURE: CPT

## 2021-01-13 PROCEDURE — 85025 COMPLETE CBC W/AUTO DIFF WBC: CPT

## 2021-01-13 PROCEDURE — 74011000250 HC RX REV CODE- 250: Performed by: NURSE PRACTITIONER

## 2021-01-13 PROCEDURE — 96365 THER/PROPH/DIAG IV INF INIT: CPT

## 2021-01-13 PROCEDURE — 96375 TX/PRO/DX INJ NEW DRUG ADDON: CPT

## 2021-01-13 RX ORDER — SODIUM CHLORIDE 0.9 % (FLUSH) 0.9 %
5-10 SYRINGE (ML) INJECTION AS NEEDED
Status: DISCONTINUED | OUTPATIENT
Start: 2021-01-13 | End: 2021-01-14 | Stop reason: HOSPADM

## 2021-01-13 RX ORDER — HEPARIN 100 UNIT/ML
500 SYRINGE INTRAVENOUS AS NEEDED
Status: DISCONTINUED | OUTPATIENT
Start: 2021-01-13 | End: 2021-01-14 | Stop reason: HOSPADM

## 2021-01-13 RX ORDER — SODIUM CHLORIDE 9 MG/ML
50 INJECTION, SOLUTION INTRAVENOUS AS NEEDED
Status: DISCONTINUED | OUTPATIENT
Start: 2021-01-13 | End: 2021-01-14 | Stop reason: HOSPADM

## 2021-01-13 RX ADMIN — HEPARIN 500 UNITS: 100 SYRINGE at 16:52

## 2021-01-13 RX ADMIN — SODIUM CHLORIDE 50 ML/HR: 900 INJECTION, SOLUTION INTRAVENOUS at 15:58

## 2021-01-13 RX ADMIN — HEPARIN 500 UNITS: 100 SYRINGE at 16:50

## 2021-01-13 RX ADMIN — Medication 10 ML: at 16:52

## 2021-01-13 RX ADMIN — DAPTOMYCIN 800 MG: 500 INJECTION, POWDER, LYOPHILIZED, FOR SOLUTION INTRAVENOUS at 16:53

## 2021-01-13 RX ADMIN — SODIUM CHLORIDE 1 G: 900 INJECTION INTRAVENOUS at 15:59

## 2021-01-13 RX ADMIN — Medication 10 ML: at 16:51

## 2021-01-13 NOTE — PROGRESS NOTES
Outpatient Infusion Center Short Visit Progress Note    2831 Pt admit to [facility] for Daily Antibiotics ambulatory in stable condition. Assessment completed. No new concerns voiced. Double lumen PICC line flushed with positive blood return. Labs drawn and sent for processing. IV attached to NS at [facility]    Patient denies SOB, fever, cough, and/or general not feeling well. Patient denies recent exposure to someone who has tested positive for COVID-19. Patient denies contact with anyone who has a pending COVID test.     Visit Vitals  /77 (BP 1 Location: Right arm, BP Patient Position: At rest)   Pulse 89   Temp 97.5 °F (36.4 °C)   Resp 18       Medications:  Medications Administered     0.9% sodium chloride infusion     Admin Date  01/13/2021 Action  New Bag Dose  50 mL/hr Rate  50 mL/hr Route  IntraVENous Administered By  Lyric Cruz RN          DAPTOmycin (CUBICIN) 800 mg in sterile water (preservative free) 16 mL IV syringe RF formulation     Admin Date  01/13/2021 Action  Given Dose  800 mg Route  IntraVENous Administered By  Lyric Cruz RN          ertapenem [facility]) 1 g in 0.9% sodium chloride (MBP/ADV) 50 mL MBP     Admin Date  01/13/2021 Action  New Bag Dose  1 g Rate  100 mL/hr Route  IntraVENous Administered By  Lyric Cruz RN          heparin (porcine) pf 500 Units     Admin Date  01/13/2021 Action  Given Dose  500 Units Route  IntraVENous Administered By  Lyric Cruz RN           Admin Date  01/13/2021 Action  Given Dose  500 Units Route  IntraVENous Administered By  Lyric Cruz RN          sodium chloride (NS) flush 5-10 mL     Admin Date  01/13/2021 Action  Given Dose  10 mL Route  IntraVENous Administered By  Lyric Cruz RN           Admin Date  01/13/2021 Action  Given Dose  10 mL Route  IntraVENous Administered By  Lyric Cruz RN                1700 Pt tolerated treatment well. D/c home ambulatory in no distress. Pt aware of next appointment scheduled for 1/14/21.     Recent Results (from the past 24 hour(s))   CBC WITH AUTOMATED DIFF    Collection Time: 01/13/21  3:53 PM   Result Value Ref Range    WBC 7.4 4.1 - 11.1 K/uL    RBC 4.35 4.10 - 5.70 M/uL    HGB 12.3 12.1 - 17.0 g/dL    HCT 37.8 36.6 - 50.3 %    MCV 86.9 80.0 - 99.0 FL    MCH 28.3 26.0 - 34.0 PG    MCHC 32.5 30.0 - 36.5 g/dL    RDW 14.0 11.5 - 14.5 %    PLATELET 927 519 - 603 K/uL    MPV 10.7 8.9 - 12.9 FL    NRBC 0.0 0  WBC    ABSOLUTE NRBC 0.00 0.00 - 0.01 K/uL    NEUTROPHILS 57 32 - 75 %    LYMPHOCYTES 22 12 - 49 %    MONOCYTES 12 5 - 13 %    EOSINOPHILS 8 (H) 0 - 7 %    BASOPHILS 1 0 - 1 %    IMMATURE GRANULOCYTES 0 0.0 - 0.5 %    ABS. NEUTROPHILS 4.3 1.8 - 8.0 K/UL    ABS. LYMPHOCYTES 1.6 0.8 - 3.5 K/UL    ABS. MONOCYTES 0.9 0.0 - 1.0 K/UL    ABS. EOSINOPHILS 0.6 (H) 0.0 - 0.4 K/UL    ABS. BASOPHILS 0.1 0.0 - 0.1 K/UL    ABS. IMM. GRANS. 0.0 0.00 - 0.04 K/UL    DF AUTOMATED     METABOLIC PANEL, COMPREHENSIVE    Collection Time: 01/13/21  3:53 PM   Result Value Ref Range    Sodium 139 136 - 145 mmol/L    Potassium 3.9 3.5 - 5.1 mmol/L    Chloride 107 97 - 108 mmol/L    CO2 26 21 - 32 mmol/L    Anion gap 6 5 - 15 mmol/L    Glucose 90 65 - 100 mg/dL    BUN 26 (H) 6 - 20 MG/DL    Creatinine 2.14 (H) 0.70 - 1.30 MG/DL    BUN/Creatinine ratio 12 12 - 20      GFR est AA 38 (L) >60 ml/min/1.73m2    GFR est non-AA 31 (L) >60 ml/min/1.73m2    Calcium 9.5 8.5 - 10.1 MG/DL    Bilirubin, total 0.4 0.2 - 1.0 MG/DL    ALT (SGPT) 27 12 - 78 U/L    AST (SGOT) 23 15 - 37 U/L    Alk.  phosphatase 40 (L) 45 - 117 U/L    Protein, total 7.6 6.4 - 8.2 g/dL    Albumin 3.6 3.5 - 5.0 g/dL    Globulin 4.0 2.0 - 4.0 g/dL    A-G Ratio 0.9 (L) 1.1 - 2.2     CK    Collection Time: 01/13/21  3:53 PM   Result Value Ref Range    CK 78 39 - 308 U/L

## 2021-01-14 ENCOUNTER — HOSPITAL ENCOUNTER (OUTPATIENT)
Dept: INFUSION THERAPY | Age: 66
Discharge: HOME | End: 2021-01-14
Payer: OTHER GOVERNMENT

## 2021-01-14 VITALS
HEART RATE: 97 BPM | DIASTOLIC BLOOD PRESSURE: 80 MMHG | RESPIRATION RATE: 16 BRPM | SYSTOLIC BLOOD PRESSURE: 120 MMHG | TEMPERATURE: 97.3 F

## 2021-01-14 PROCEDURE — 96365 THER/PROPH/DIAG IV INF INIT: CPT

## 2021-01-14 PROCEDURE — 74011250636 HC RX REV CODE- 250/636: Performed by: NURSE PRACTITIONER

## 2021-01-14 PROCEDURE — 74011000250 HC RX REV CODE- 250: Performed by: NURSE PRACTITIONER

## 2021-01-14 PROCEDURE — 74011000258 HC RX REV CODE- 258: Performed by: NURSE PRACTITIONER

## 2021-01-14 PROCEDURE — 96375 TX/PRO/DX INJ NEW DRUG ADDON: CPT

## 2021-01-14 RX ADMIN — DAPTOMYCIN 800 MG: 500 INJECTION, POWDER, LYOPHILIZED, FOR SOLUTION INTRAVENOUS at 17:56

## 2021-01-14 RX ADMIN — SODIUM CHLORIDE 1 G: 900 INJECTION INTRAVENOUS at 17:20

## 2021-01-14 NOTE — PROGRESS NOTES
Outpatient Infusion Center - Chemotherapy Progress Note    0046 Pt admit to [place] for daily Daptomycin/Invanz ambulatory in stable condition. Assessment completed. No new concerns voiced. PICC line flushed with positive blood return; NS infusing. Patient denies SOB, fever, cough, general not feeling well. Patient denies recent exposure to someone who has tested positive for COVID-19. Patient  denies having contact with anyone who has a pending COVID test.     Visit Vitals  /80   Pulse 97   Temp 97.3 °F (36.3 °C)   Resp 16     Medications Administered     DAPTOmycin (CUBICIN) 800 mg in sterile water (preservative free) 16 mL IV syringe RF formulation     Admin Date  01/14/2021 Action  Given Dose  800 mg Route  IntraVENous Administered By  Princess Horner RN          ertapenem [facility]) 1 g in 0.9% sodium chloride (MBP/ADV) 50 mL MBP     Admin Date  01/14/2021 Action  New Bag Dose  1 g Rate  100 mL/hr Route  IntraVENous Administered By  Princess Horner RN                  1800 Pt tolerated treatment well. PICC line flushed, heparinized and capped. D/c home ambulatory in no distress. Pt aware of next [facility] appointment scheduled for 01/15/2021.

## 2021-01-15 ENCOUNTER — HOSPITAL ENCOUNTER (OUTPATIENT)
Dept: INFUSION THERAPY | Age: 66
Discharge: HOME | End: 2021-01-15
Payer: OTHER GOVERNMENT

## 2021-01-15 VITALS
DIASTOLIC BLOOD PRESSURE: 81 MMHG | HEART RATE: 90 BPM | TEMPERATURE: 96.8 F | SYSTOLIC BLOOD PRESSURE: 118 MMHG | RESPIRATION RATE: 16 BRPM

## 2021-01-15 PROCEDURE — 74011250636 HC RX REV CODE- 250/636: Performed by: NURSE PRACTITIONER

## 2021-01-15 PROCEDURE — 96365 THER/PROPH/DIAG IV INF INIT: CPT

## 2021-01-15 PROCEDURE — 96375 TX/PRO/DX INJ NEW DRUG ADDON: CPT

## 2021-01-15 PROCEDURE — 74011000258 HC RX REV CODE- 258: Performed by: NURSE PRACTITIONER

## 2021-01-15 PROCEDURE — 74011000250 HC RX REV CODE- 250: Performed by: NURSE PRACTITIONER

## 2021-01-15 RX ADMIN — SODIUM CHLORIDE 1 G: 900 INJECTION INTRAVENOUS at 17:10

## 2021-01-15 RX ADMIN — DAPTOMYCIN 800 MG: 500 INJECTION, POWDER, LYOPHILIZED, FOR SOLUTION INTRAVENOUS at 17:45

## 2021-01-15 NOTE — PROGRESS NOTES
Outpatient Infusion Center - Chemotherapy Progress Note    1700 Pt admit to [facility] for daily Daptomycin/Invanz ambulatory in stable condition. Assessment completed. No new concerns voiced. PICC line flushed with positive blood return. Patient denies SOB, fever, cough, general not feeling well. Patient denies recent exposure to someone who has tested positive for COVID-19. Patient  denies having contact with anyone who has a pending COVID test.     Visit Vitals  /81   Pulse 90   Temp 96.8 °F (36 °C)   Resp 16       Medications Administered     DAPTOmycin (CUBICIN) 800 mg in sterile water (preservative free) 16 mL IV syringe RF formulation     Admin Date  01/15/2021 Action  Given Dose  800 mg Route  IntraVENous Administered By  Rabagojoel Whitley, RN          ertapenem [facility]) 1 g in 0.9% sodium chloride (MBP/ADV) 50 mL MBP     Admin Date  01/15/2021 Action  New Bag Dose  1 g Rate  100 mL/hr Route  IntraVENous Administered By  Rabago Angi, RN                  1800 Pt tolerated treatment well. PICC line flushed, heparinized and capped. D/c home ambulatory in no distress. Pt aware of next [facility] appointment scheduled for 01/16/2021.

## 2021-01-16 ENCOUNTER — HOSPITAL ENCOUNTER (OUTPATIENT)
Dept: INFUSION THERAPY | Age: 66
Discharge: HOME | End: 2021-01-16
Payer: OTHER GOVERNMENT

## 2021-01-16 VITALS
DIASTOLIC BLOOD PRESSURE: 73 MMHG | RESPIRATION RATE: 18 BRPM | HEART RATE: 77 BPM | SYSTOLIC BLOOD PRESSURE: 107 MMHG | TEMPERATURE: 98.6 F

## 2021-01-16 PROCEDURE — 96365 THER/PROPH/DIAG IV INF INIT: CPT

## 2021-01-16 PROCEDURE — 74011000250 HC RX REV CODE- 250

## 2021-01-16 PROCEDURE — 74011000250 HC RX REV CODE- 250: Performed by: NURSE PRACTITIONER

## 2021-01-16 PROCEDURE — 74011250636 HC RX REV CODE- 250/636: Performed by: NURSE PRACTITIONER

## 2021-01-16 PROCEDURE — 74011000258 HC RX REV CODE- 258: Performed by: NURSE PRACTITIONER

## 2021-01-16 PROCEDURE — 74011250636 HC RX REV CODE- 250/636

## 2021-01-16 PROCEDURE — 96375 TX/PRO/DX INJ NEW DRUG ADDON: CPT

## 2021-01-16 RX ORDER — SODIUM CHLORIDE 9 MG/ML
25 INJECTION, SOLUTION INTRAVENOUS AS NEEDED
Status: DISCONTINUED | OUTPATIENT
Start: 2021-01-16 | End: 2021-01-17 | Stop reason: HOSPADM

## 2021-01-16 RX ORDER — SODIUM CHLORIDE 0.9 % (FLUSH) 0.9 %
10 SYRINGE (ML) INJECTION AS NEEDED
Status: DISCONTINUED | OUTPATIENT
Start: 2021-01-16 | End: 2021-01-17 | Stop reason: HOSPADM

## 2021-01-16 RX ORDER — BACITRACIN 500 UNIT/G
1 PACKET (EA) TOPICAL
Status: COMPLETED | OUTPATIENT
Start: 2021-01-16 | End: 2021-01-16

## 2021-01-16 RX ADMIN — SODIUM CHLORIDE 1 G: 900 INJECTION INTRAVENOUS at 09:18

## 2021-01-16 RX ADMIN — Medication 10 ML: at 09:59

## 2021-01-16 RX ADMIN — SODIUM CHLORIDE 25 ML/HR: 900 INJECTION, SOLUTION INTRAVENOUS at 09:15

## 2021-01-16 RX ADMIN — Medication 10 ML: at 09:56

## 2021-01-16 RX ADMIN — BACITRACIN 1 PACKET: 500 OINTMENT TOPICAL at 10:05

## 2021-01-16 RX ADMIN — DAPTOMYCIN 800 MG: 500 INJECTION, POWDER, LYOPHILIZED, FOR SOLUTION INTRAVENOUS at 09:58

## 2021-01-16 RX ADMIN — Medication 10 ML: at 09:10

## 2021-01-16 NOTE — PROGRESS NOTES
[address] [facility] @ [facility] VISIT NOTE     7043 Patient arrives for Daily IV Invanz/Daptomycin without acute problems. Please see connect care for complete assessment and education provided. Vital signs stable throughout and prior to discharge, Pt. Tolerated treatment well and discharged without incident. RUE DL PICC line removed per [facility] policy. Pressure held to site x 5 minutes, after removal with Bacitracin oint applied to PICC insertion site, covered with gauze & secured with transparent dressing. Patient lay flat x 30 minutes post removal. Patient instructed to keep dressing intact x 24 hours with verbal understanding noted. Patient is aware of no further [facility] appointments @ this thime & to follow up with [facility] provider for next appointment. The patient/parent denies any symptoms of COVID (SOB, coughing, fever, or generally not feeling well), denies any known exposure to COVID-19 recently, and denies any known recent contact with family/friend that has a pending COVID test.      Medications Verified by Jace Kidd RN via [employer]:  1. Invanz 1g IV  2. Daptomycin 800 mg IVP  3. NS IV Flush & KVO via [address] DL PICC line  4.  Bacitracin oint to RUE DL PICC line removal site      VITAL SIGNS  Patient Vitals for the past 12 hrs:   Temp Pulse Resp BP   01/16/21 1037 98.6 °F (37 °C) 77 18 107/73   01/16/21 0906 98.8 °F (37.1 °C) 77 18 115/78

## 2021-01-16 NOTE — PROGRESS NOTES
Patient tolerated his last day of IV Antibiotic therapy & RUE DL PICC line removal without difficulty.

## 2023-01-16 ENCOUNTER — HOSPITAL ENCOUNTER (INPATIENT)
Age: 68
LOS: 6 days | Discharge: HOME | DRG: 637 | End: 2023-01-22
Attending: STUDENT IN AN ORGANIZED HEALTH CARE EDUCATION/TRAINING PROGRAM | Admitting: INTERNAL MEDICINE
Payer: MEDICARE

## 2023-01-16 ENCOUNTER — APPOINTMENT (OUTPATIENT)
Dept: GENERAL RADIOLOGY | Age: 68
DRG: 637 | End: 2023-01-16
Attending: PHYSICIAN ASSISTANT
Payer: MEDICARE

## 2023-01-16 ENCOUNTER — APPOINTMENT (OUTPATIENT)
Dept: CT IMAGING | Age: 68
DRG: 637 | End: 2023-01-16
Attending: PHYSICIAN ASSISTANT
Payer: MEDICARE

## 2023-01-16 DIAGNOSIS — R25.3 TWITCHING: ICD-10-CM

## 2023-01-16 DIAGNOSIS — E11.00 TYPE 2 DIABETES MELLITUS WITH HYPEROSMOLAR NONKETOTIC HYPERGLYCEMIA (HCC): ICD-10-CM

## 2023-01-16 DIAGNOSIS — R25.9 ABNORMAL INVOLUNTARY MOVEMENT: ICD-10-CM

## 2023-01-16 DIAGNOSIS — K76.82 HEPATIC ENCEPHALOPATHY: Primary | ICD-10-CM

## 2023-01-16 DIAGNOSIS — R32 URINARY INCONTINENCE, UNSPECIFIED TYPE: ICD-10-CM

## 2023-01-16 DIAGNOSIS — R73.9 HYPERGLYCEMIA: ICD-10-CM

## 2023-01-16 DIAGNOSIS — E72.20 SERUM AMMONIA INCREASED (HCC): ICD-10-CM

## 2023-01-16 DIAGNOSIS — E11.65 TYPE 2 DIABETES MELLITUS WITH HYPERGLYCEMIA, WITHOUT LONG-TERM CURRENT USE OF INSULIN (HCC): ICD-10-CM

## 2023-01-16 LAB
ALBUMIN SERPL-MCNC: 3 G/DL (ref 3.5–5)
ALBUMIN/GLOB SERPL: 0.5 (ref 1.1–2.2)
ALP SERPL-CCNC: 87 U/L (ref 45–117)
ALT SERPL-CCNC: 33 U/L (ref 12–78)
AMMONIA PLAS-SCNC: 110 UMOL/L
ANION GAP SERPL CALC-SCNC: 7 MMOL/L (ref 5–15)
AST SERPL-CCNC: 24 U/L (ref 15–37)
BASOPHILS # BLD: 0.1 K/UL (ref 0–0.1)
BASOPHILS NFR BLD: 1 % (ref 0–1)
BILIRUB SERPL-MCNC: 0.5 MG/DL (ref 0.2–1)
BUN SERPL-MCNC: 25 MG/DL (ref 6–20)
BUN/CREAT SERPL: 15 (ref 12–20)
CALCIUM SERPL-MCNC: 10 MG/DL (ref 8.5–10.1)
CHLORIDE SERPL-SCNC: 90 MMOL/L (ref 97–108)
CO2 SERPL-SCNC: 28 MMOL/L (ref 21–32)
COMMENT, HOLDF: NORMAL
CREAT SERPL-MCNC: 1.7 MG/DL (ref 0.7–1.3)
DIFFERENTIAL METHOD BLD: ABNORMAL
EOSINOPHIL # BLD: 0.1 K/UL (ref 0–0.4)
EOSINOPHIL NFR BLD: 1 % (ref 0–7)
ERYTHROCYTE [DISTWIDTH] IN BLOOD BY AUTOMATED COUNT: 14.8 % (ref 11.5–14.5)
GLOBULIN SER CALC-MCNC: 5.6 G/DL (ref 2–4)
GLUCOSE BLD STRIP.AUTO-MCNC: 552 MG/DL (ref 65–117)
GLUCOSE SERPL-MCNC: 607 MG/DL (ref 65–100)
HCT VFR BLD AUTO: 43.6 % (ref 36.6–50.3)
HGB BLD-MCNC: 13.9 G/DL (ref 12.1–17)
IMM GRANULOCYTES # BLD AUTO: 0 K/UL (ref 0–0.04)
IMM GRANULOCYTES NFR BLD AUTO: 0 % (ref 0–0.5)
LYMPHOCYTES # BLD: 1 K/UL (ref 0.8–3.5)
LYMPHOCYTES NFR BLD: 13 % (ref 12–49)
MCH RBC QN AUTO: 27.6 PG (ref 26–34)
MCHC RBC AUTO-ENTMCNC: 31.9 G/DL (ref 30–36.5)
MCV RBC AUTO: 86.5 FL (ref 80–99)
MONOCYTES # BLD: 0.7 K/UL (ref 0–1)
MONOCYTES NFR BLD: 9 % (ref 5–13)
NEUTS SEG # BLD: 6 K/UL (ref 1.8–8)
NEUTS SEG NFR BLD: 76 % (ref 32–75)
NRBC # BLD: 0 K/UL (ref 0–0.01)
NRBC BLD-RTO: 0 PER 100 WBC
PLATELET # BLD AUTO: 240 K/UL (ref 150–400)
PMV BLD AUTO: 11.3 FL (ref 8.9–12.9)
POTASSIUM SERPL-SCNC: 4.3 MMOL/L (ref 3.5–5.1)
PROT SERPL-MCNC: 8.6 G/DL (ref 6.4–8.2)
RBC # BLD AUTO: 5.04 M/UL (ref 4.1–5.7)
SAMPLES BEING HELD,HOLD: NORMAL
SERVICE CMNT-IMP: ABNORMAL
SODIUM SERPL-SCNC: 125 MMOL/L (ref 136–145)
WBC # BLD AUTO: 7.8 K/UL (ref 4.1–11.1)

## 2023-01-16 PROCEDURE — 70450 CT HEAD/BRAIN W/O DYE: CPT

## 2023-01-16 PROCEDURE — 36415 COLL VENOUS BLD VENIPUNCTURE: CPT

## 2023-01-16 PROCEDURE — 65270000029 HC RM PRIVATE

## 2023-01-16 PROCEDURE — 83721 ASSAY OF BLOOD LIPOPROTEIN: CPT

## 2023-01-16 PROCEDURE — 71046 X-RAY EXAM CHEST 2 VIEWS: CPT

## 2023-01-16 PROCEDURE — 84443 ASSAY THYROID STIM HORMONE: CPT

## 2023-01-16 PROCEDURE — 84100 ASSAY OF PHOSPHORUS: CPT

## 2023-01-16 PROCEDURE — 82077 ASSAY SPEC XCP UR&BREATH IA: CPT

## 2023-01-16 PROCEDURE — 99285 EMERGENCY DEPT VISIT HI MDM: CPT

## 2023-01-16 PROCEDURE — 80179 DRUG ASSAY SALICYLATE: CPT

## 2023-01-16 PROCEDURE — 82607 VITAMIN B-12: CPT

## 2023-01-16 PROCEDURE — 93005 ELECTROCARDIOGRAM TRACING: CPT

## 2023-01-16 PROCEDURE — 83735 ASSAY OF MAGNESIUM: CPT

## 2023-01-16 PROCEDURE — 82140 ASSAY OF AMMONIA: CPT

## 2023-01-16 PROCEDURE — 80053 COMPREHEN METABOLIC PANEL: CPT

## 2023-01-16 PROCEDURE — 83880 ASSAY OF NATRIURETIC PEPTIDE: CPT

## 2023-01-16 PROCEDURE — 82746 ASSAY OF FOLIC ACID SERUM: CPT

## 2023-01-16 PROCEDURE — 85025 COMPLETE CBC W/AUTO DIFF WBC: CPT

## 2023-01-16 PROCEDURE — 84484 ASSAY OF TROPONIN QUANT: CPT

## 2023-01-16 PROCEDURE — 83036 HEMOGLOBIN GLYCOSYLATED A1C: CPT

## 2023-01-16 PROCEDURE — 82962 GLUCOSE BLOOD TEST: CPT

## 2023-01-16 PROCEDURE — 80143 DRUG ASSAY ACETAMINOPHEN: CPT

## 2023-01-16 PROCEDURE — 80061 LIPID PANEL: CPT

## 2023-01-17 ENCOUNTER — APPOINTMENT (OUTPATIENT)
Dept: MRI IMAGING | Age: 68
DRG: 637 | End: 2023-01-17
Attending: INTERNAL MEDICINE
Payer: MEDICARE

## 2023-01-17 ENCOUNTER — APPOINTMENT (OUTPATIENT)
Dept: CT IMAGING | Age: 68
DRG: 637 | End: 2023-01-17
Attending: INTERNAL MEDICINE
Payer: MEDICARE

## 2023-01-17 PROBLEM — E11.00 TYPE 2 DIABETES MELLITUS WITH HYPEROSMOLAR NONKETOTIC HYPERGLYCEMIA (HCC): Status: ACTIVE | Noted: 2023-01-17

## 2023-01-17 LAB
ADMINISTERED INITIALS, ADMINIT: NORMAL
AMMONIA PLAS-SCNC: 54 UMOL/L
AMPHET UR QL SCN: NEGATIVE
ANION GAP SERPL CALC-SCNC: 6 MMOL/L (ref 5–15)
ANION GAP SERPL CALC-SCNC: 7 MMOL/L (ref 5–15)
APAP SERPL-MCNC: <2 UG/ML (ref 10–30)
APPEARANCE UR: ABNORMAL
ATRIAL RATE: 100 BPM
BACTERIA URNS QL MICRO: ABNORMAL /HPF
BARBITURATES UR QL SCN: NEGATIVE
BENZODIAZ UR QL: NEGATIVE
BILIRUB UR QL: NEGATIVE
BNP SERPL-MCNC: 169 PG/ML
BUN SERPL-MCNC: 20 MG/DL (ref 6–20)
BUN SERPL-MCNC: 22 MG/DL (ref 6–20)
BUN SERPL-MCNC: 24 MG/DL (ref 6–20)
BUN SERPL-MCNC: 26 MG/DL (ref 6–20)
BUN/CREAT SERPL: 17 (ref 12–20)
BUN/CREAT SERPL: 18 (ref 12–20)
BUN/CREAT SERPL: 18 (ref 12–20)
BUN/CREAT SERPL: 20 (ref 12–20)
CALCIUM SERPL-MCNC: 8.6 MG/DL (ref 8.5–10.1)
CALCIUM SERPL-MCNC: 8.8 MG/DL (ref 8.5–10.1)
CALCIUM SERPL-MCNC: 9.1 MG/DL (ref 8.5–10.1)
CALCIUM SERPL-MCNC: 9.5 MG/DL (ref 8.5–10.1)
CALCULATED P AXIS, ECG09: 25 DEGREES
CALCULATED R AXIS, ECG10: -63 DEGREES
CALCULATED T AXIS, ECG11: 13 DEGREES
CANNABINOIDS UR QL SCN: NEGATIVE
CHLORIDE SERPL-SCNC: 100 MMOL/L (ref 97–108)
CHLORIDE SERPL-SCNC: 100 MMOL/L (ref 97–108)
CHLORIDE SERPL-SCNC: 103 MMOL/L (ref 97–108)
CHLORIDE SERPL-SCNC: 106 MMOL/L (ref 97–108)
CHOLEST SERPL-MCNC: 308 MG/DL
CO2 SERPL-SCNC: 23 MMOL/L (ref 21–32)
CO2 SERPL-SCNC: 26 MMOL/L (ref 21–32)
CO2 SERPL-SCNC: 27 MMOL/L (ref 21–32)
CO2 SERPL-SCNC: 27 MMOL/L (ref 21–32)
COCAINE UR QL SCN: NEGATIVE
COLOR UR: ABNORMAL
COMMENT, HOLDF: NORMAL
CREAT SERPL-MCNC: 1.15 MG/DL (ref 0.7–1.3)
CREAT SERPL-MCNC: 1.25 MG/DL (ref 0.7–1.3)
CREAT SERPL-MCNC: 1.28 MG/DL (ref 0.7–1.3)
CREAT SERPL-MCNC: 1.35 MG/DL (ref 0.7–1.3)
D50 ADMINISTERED, D50ADM: 0 ML
D50 ORDER, D50ORD: 0 ML
DIAGNOSIS, 93000: NORMAL
DRUG SCRN COMMENT,DRGCM: NORMAL
EPITH CASTS URNS QL MICRO: ABNORMAL /LPF
EST. AVERAGE GLUCOSE BLD GHB EST-MCNC: ABNORMAL MG/DL
ETHANOL SERPL-MCNC: <10 MG/DL
FOLATE SERPL-MCNC: 9 NG/ML (ref 5–21)
GLUCOSE BLD STRIP.AUTO-MCNC: 103 MG/DL (ref 65–117)
GLUCOSE BLD STRIP.AUTO-MCNC: 117 MG/DL (ref 65–117)
GLUCOSE BLD STRIP.AUTO-MCNC: 128 MG/DL (ref 65–117)
GLUCOSE BLD STRIP.AUTO-MCNC: 138 MG/DL (ref 65–117)
GLUCOSE BLD STRIP.AUTO-MCNC: 142 MG/DL (ref 65–117)
GLUCOSE BLD STRIP.AUTO-MCNC: 144 MG/DL (ref 65–117)
GLUCOSE BLD STRIP.AUTO-MCNC: 160 MG/DL (ref 65–117)
GLUCOSE BLD STRIP.AUTO-MCNC: 186 MG/DL (ref 65–117)
GLUCOSE BLD STRIP.AUTO-MCNC: 192 MG/DL (ref 65–117)
GLUCOSE BLD STRIP.AUTO-MCNC: 200 MG/DL (ref 65–117)
GLUCOSE BLD STRIP.AUTO-MCNC: 215 MG/DL (ref 65–117)
GLUCOSE BLD STRIP.AUTO-MCNC: 264 MG/DL (ref 65–117)
GLUCOSE BLD STRIP.AUTO-MCNC: 327 MG/DL (ref 65–117)
GLUCOSE BLD STRIP.AUTO-MCNC: 330 MG/DL (ref 65–117)
GLUCOSE BLD STRIP.AUTO-MCNC: 340 MG/DL (ref 65–117)
GLUCOSE BLD STRIP.AUTO-MCNC: 379 MG/DL (ref 65–117)
GLUCOSE BLD STRIP.AUTO-MCNC: 383 MG/DL (ref 65–117)
GLUCOSE BLD STRIP.AUTO-MCNC: 398 MG/DL (ref 65–117)
GLUCOSE BLD STRIP.AUTO-MCNC: 406 MG/DL (ref 65–117)
GLUCOSE BLD STRIP.AUTO-MCNC: 446 MG/DL (ref 65–117)
GLUCOSE SERPL-MCNC: 145 MG/DL (ref 65–100)
GLUCOSE SERPL-MCNC: 235 MG/DL (ref 65–100)
GLUCOSE SERPL-MCNC: 271 MG/DL (ref 65–100)
GLUCOSE SERPL-MCNC: 411 MG/DL (ref 65–100)
GLUCOSE UR STRIP.AUTO-MCNC: >1000 MG/DL
GLUCOSE, GLC: 103 MG/DL
GLUCOSE, GLC: 117 MG/DL
GLUCOSE, GLC: 128 MG/DL
GLUCOSE, GLC: 138 MG/DL
GLUCOSE, GLC: 142 MG/DL
GLUCOSE, GLC: 144 MG/DL
GLUCOSE, GLC: 160 MG/DL
GLUCOSE, GLC: 186 MG/DL
GLUCOSE, GLC: 192 MG/DL
GLUCOSE, GLC: 200 MG/DL
GLUCOSE, GLC: 215 MG/DL
GLUCOSE, GLC: 264 MG/DL
GLUCOSE, GLC: 327 MG/DL
GLUCOSE, GLC: 330 MG/DL
GLUCOSE, GLC: 340 MG/DL
GLUCOSE, GLC: 379 MG/DL
GLUCOSE, GLC: 383 MG/DL
GLUCOSE, GLC: 398 MG/DL
GLUCOSE, GLC: 406 MG/DL
HBA1C MFR BLD: >14 % (ref 4–5.6)
HDLC SERPL-MCNC: 31 MG/DL
HDLC SERPL: 9.9 (ref 0–5)
HGB UR QL STRIP: ABNORMAL
HIGH TARGET, HITG: 180 MG/DL
HIGH TARGET, HITG: 300 MG/DL
INSULIN ADMINSTERED, INSADM: 0 UNITS/HOUR
INSULIN ADMINSTERED, INSADM: 1.3 UNITS/HOUR
INSULIN ADMINSTERED, INSADM: 1.6 UNITS/HOUR
INSULIN ADMINSTERED, INSADM: 1.9 UNITS/HOUR
INSULIN ADMINSTERED, INSADM: 10.1 UNITS/HOUR
INSULIN ADMINSTERED, INSADM: 10.2 UNITS/HOUR
INSULIN ADMINSTERED, INSADM: 10.6 UNITS/HOUR
INSULIN ADMINSTERED, INSADM: 10.7 UNITS/HOUR
INSULIN ADMINSTERED, INSADM: 2 UNITS/HOUR
INSULIN ADMINSTERED, INSADM: 2.2 UNITS/HOUR
INSULIN ADMINSTERED, INSADM: 2.7 UNITS/HOUR
INSULIN ADMINSTERED, INSADM: 2.7 UNITS/HOUR
INSULIN ADMINSTERED, INSADM: 3.1 UNITS/HOUR
INSULIN ADMINSTERED, INSADM: 3.2 UNITS/HOUR
INSULIN ADMINSTERED, INSADM: 4.7 UNITS/HOUR
INSULIN ADMINSTERED, INSADM: 6.4 UNITS/HOUR
INSULIN ADMINSTERED, INSADM: 6.9 UNITS/HOUR
INSULIN ADMINSTERED, INSADM: 8.4 UNITS/HOUR
INSULIN ADMINSTERED, INSADM: 9.7 UNITS/HOUR
INSULIN ORDER, INSORD: 0 UNITS/HOUR
INSULIN ORDER, INSORD: 1.3 UNITS/HOUR
INSULIN ORDER, INSORD: 1.6 UNITS/HOUR
INSULIN ORDER, INSORD: 1.9 UNITS/HOUR
INSULIN ORDER, INSORD: 10.1 UNITS/HOUR
INSULIN ORDER, INSORD: 10.2 UNITS/HOUR
INSULIN ORDER, INSORD: 10.6 UNITS/HOUR
INSULIN ORDER, INSORD: 10.7 UNITS/HOUR
INSULIN ORDER, INSORD: 2 UNITS/HOUR
INSULIN ORDER, INSORD: 2.2 UNITS/HOUR
INSULIN ORDER, INSORD: 2.7 UNITS/HOUR
INSULIN ORDER, INSORD: 2.7 UNITS/HOUR
INSULIN ORDER, INSORD: 3.1 UNITS/HOUR
INSULIN ORDER, INSORD: 3.2 UNITS/HOUR
INSULIN ORDER, INSORD: 4.7 UNITS/HOUR
INSULIN ORDER, INSORD: 6.4 UNITS/HOUR
INSULIN ORDER, INSORD: 6.9 UNITS/HOUR
INSULIN ORDER, INSORD: 8.4 UNITS/HOUR
INSULIN ORDER, INSORD: 9.7 UNITS/HOUR
KETONES UR QL STRIP.AUTO: NEGATIVE MG/DL
LDLC SERPL CALC-MCNC: ABNORMAL MG/DL (ref 0–100)
LDLC SERPL DIRECT ASSAY-MCNC: 54 MG/DL (ref 0–100)
LEUKOCYTE ESTERASE UR QL STRIP.AUTO: ABNORMAL
LOW TARGET, LOT: 140 MG/DL
LOW TARGET, LOT: 200 MG/DL
MAGNESIUM SERPL-MCNC: 2 MG/DL (ref 1.6–2.4)
MAGNESIUM SERPL-MCNC: 2.2 MG/DL (ref 1.6–2.4)
MAGNESIUM SERPL-MCNC: 2.3 MG/DL (ref 1.6–2.4)
METHADONE UR QL: NEGATIVE
MINUTES UNTIL NEXT BG, NBG: 60 MIN
MULTIPLIER, MUL: 0
MULTIPLIER, MUL: 0.01
MULTIPLIER, MUL: 0.01
MULTIPLIER, MUL: 0.02
MULTIPLIER, MUL: 0.03
MULTIPLIER, MUL: 0.04
MULTIPLIER, MUL: 0.05
MULTIPLIER, MUL: 0.05
MULTIPLIER, MUL: 0.06
NITRITE UR QL STRIP.AUTO: POSITIVE
OPIATES UR QL: NEGATIVE
ORDER INITIALS, ORDINIT: NORMAL
P-R INTERVAL, ECG05: 148 MS
PCP UR QL: NEGATIVE
PH UR STRIP: 6 (ref 5–8)
PHOSPHATE SERPL-MCNC: 2.7 MG/DL (ref 2.6–4.7)
PHOSPHATE SERPL-MCNC: 3.2 MG/DL (ref 2.6–4.7)
POTASSIUM SERPL-SCNC: 3.6 MMOL/L (ref 3.5–5.1)
POTASSIUM SERPL-SCNC: 3.7 MMOL/L (ref 3.5–5.1)
POTASSIUM SERPL-SCNC: 4 MMOL/L (ref 3.5–5.1)
POTASSIUM SERPL-SCNC: 4.3 MMOL/L (ref 3.5–5.1)
PROT UR STRIP-MCNC: ABNORMAL MG/DL
Q-T INTERVAL, ECG07: 328 MS
QRS DURATION, ECG06: 90 MS
QTC CALCULATION (BEZET), ECG08: 423 MS
RBC #/AREA URNS HPF: ABNORMAL /HPF (ref 0–5)
SALICYLATES SERPL-MCNC: <1.7 MG/DL (ref 2.8–20)
SAMPLES BEING HELD,HOLD: NORMAL
SERVICE CMNT-IMP: ABNORMAL
SERVICE CMNT-IMP: NORMAL
SERVICE CMNT-IMP: NORMAL
SODIUM SERPL-SCNC: 130 MMOL/L (ref 136–145)
SODIUM SERPL-SCNC: 134 MMOL/L (ref 136–145)
SODIUM SERPL-SCNC: 136 MMOL/L (ref 136–145)
SODIUM SERPL-SCNC: 139 MMOL/L (ref 136–145)
SP GR UR REFRACTOMETRY: 1.01 (ref 1–1.03)
TRIGL SERPL-MCNC: 2029 MG/DL (ref ?–150)
TROPONIN-HIGH SENSITIVITY: 11 NG/L (ref 0–76)
TSH SERPL DL<=0.05 MIU/L-ACNC: 2.31 UIU/ML (ref 0.36–3.74)
UR CULT HOLD, URHOLD: NORMAL
UROBILINOGEN UR QL STRIP.AUTO: 0.2 EU/DL (ref 0.2–1)
VENTRICULAR RATE, ECG03: 100 BPM
VIT B12 SERPL-MCNC: 790 PG/ML (ref 193–986)
VLDLC SERPL CALC-MCNC: ABNORMAL MG/DL
WBC URNS QL MICRO: ABNORMAL /HPF (ref 0–4)

## 2023-01-17 PROCEDURE — 80048 BASIC METABOLIC PNL TOTAL CA: CPT

## 2023-01-17 PROCEDURE — 74176 CT ABD & PELVIS W/O CONTRAST: CPT

## 2023-01-17 PROCEDURE — 74011636637 HC RX REV CODE- 636/637: Performed by: CLINICAL NURSE SPECIALIST

## 2023-01-17 PROCEDURE — 81001 URINALYSIS AUTO W/SCOPE: CPT

## 2023-01-17 PROCEDURE — 80307 DRUG TEST PRSMV CHEM ANLYZR: CPT

## 2023-01-17 PROCEDURE — 99222 1ST HOSP IP/OBS MODERATE 55: CPT | Performed by: PSYCHIATRY & NEUROLOGY

## 2023-01-17 PROCEDURE — 74011000258 HC RX REV CODE- 258: Performed by: CLINICAL NURSE SPECIALIST

## 2023-01-17 PROCEDURE — 95816 EEG AWAKE AND DROWSY: CPT | Performed by: INTERNAL MEDICINE

## 2023-01-17 PROCEDURE — 36415 COLL VENOUS BLD VENIPUNCTURE: CPT

## 2023-01-17 PROCEDURE — 82962 GLUCOSE BLOOD TEST: CPT

## 2023-01-17 PROCEDURE — 83735 ASSAY OF MAGNESIUM: CPT

## 2023-01-17 PROCEDURE — 74011250636 HC RX REV CODE- 250/636: Performed by: STUDENT IN AN ORGANIZED HEALTH CARE EDUCATION/TRAINING PROGRAM

## 2023-01-17 PROCEDURE — 74011636637 HC RX REV CODE- 636/637: Performed by: STUDENT IN AN ORGANIZED HEALTH CARE EDUCATION/TRAINING PROGRAM

## 2023-01-17 PROCEDURE — 82140 ASSAY OF AMMONIA: CPT

## 2023-01-17 PROCEDURE — 74011636637 HC RX REV CODE- 636/637: Performed by: INTERNAL MEDICINE

## 2023-01-17 PROCEDURE — 99223 1ST HOSP IP/OBS HIGH 75: CPT | Performed by: CLINICAL NURSE SPECIALIST

## 2023-01-17 PROCEDURE — 74011000258 HC RX REV CODE- 258: Performed by: INTERNAL MEDICINE

## 2023-01-17 PROCEDURE — 87150 DNA/RNA AMPLIFIED PROBE: CPT

## 2023-01-17 PROCEDURE — 74011250636 HC RX REV CODE- 250/636: Performed by: INTERNAL MEDICINE

## 2023-01-17 PROCEDURE — 84100 ASSAY OF PHOSPHORUS: CPT

## 2023-01-17 PROCEDURE — 74011000250 HC RX REV CODE- 250: Performed by: INTERNAL MEDICINE

## 2023-01-17 PROCEDURE — 87077 CULTURE AEROBIC IDENTIFY: CPT

## 2023-01-17 PROCEDURE — 74011250637 HC RX REV CODE- 250/637: Performed by: INTERNAL MEDICINE

## 2023-01-17 PROCEDURE — 65660000001 HC RM ICU INTERMED STEPDOWN

## 2023-01-17 PROCEDURE — 87086 URINE CULTURE/COLONY COUNT: CPT

## 2023-01-17 PROCEDURE — 95816 EEG AWAKE AND DROWSY: CPT | Performed by: PSYCHIATRY & NEUROLOGY

## 2023-01-17 PROCEDURE — 71250 CT THORAX DX C-: CPT

## 2023-01-17 PROCEDURE — 87186 SC STD MICRODIL/AGAR DIL: CPT

## 2023-01-17 PROCEDURE — 87040 BLOOD CULTURE FOR BACTERIA: CPT

## 2023-01-17 RX ORDER — ASPIRIN 81 MG/1
81 TABLET ORAL DAILY
Status: DISCONTINUED | OUTPATIENT
Start: 2023-01-17 | End: 2023-01-18 | Stop reason: ALTCHOICE

## 2023-01-17 RX ORDER — INSULIN LISPRO 100 [IU]/ML
INJECTION, SOLUTION INTRAVENOUS; SUBCUTANEOUS
Status: DISCONTINUED | OUTPATIENT
Start: 2023-01-17 | End: 2023-01-17

## 2023-01-17 RX ORDER — ONDANSETRON 2 MG/ML
4 INJECTION INTRAMUSCULAR; INTRAVENOUS
Status: DISCONTINUED | OUTPATIENT
Start: 2023-01-17 | End: 2023-01-22 | Stop reason: HOSPADM

## 2023-01-17 RX ORDER — DEXTROSE MONOHYDRATE AND SODIUM CHLORIDE 5; .9 G/100ML; G/100ML
150 INJECTION, SOLUTION INTRAVENOUS CONTINUOUS
Status: DISPENSED | OUTPATIENT
Start: 2023-01-17 | End: 2023-01-19

## 2023-01-17 RX ORDER — ONDANSETRON 4 MG/1
4 TABLET, ORALLY DISINTEGRATING ORAL
Status: DISCONTINUED | OUTPATIENT
Start: 2023-01-17 | End: 2023-01-22 | Stop reason: HOSPADM

## 2023-01-17 RX ORDER — SODIUM CHLORIDE 0.9 % (FLUSH) 0.9 %
5-40 SYRINGE (ML) INJECTION EVERY 8 HOURS
Status: DISCONTINUED | OUTPATIENT
Start: 2023-01-17 | End: 2023-01-22 | Stop reason: HOSPADM

## 2023-01-17 RX ORDER — FEBUXOSTAT 80 MG/1
80 TABLET, FILM COATED ORAL DAILY
COMMUNITY
End: 2023-01-22

## 2023-01-17 RX ORDER — LISINOPRIL 20 MG/1
40 TABLET ORAL DAILY
Status: DISCONTINUED | OUTPATIENT
Start: 2023-01-17 | End: 2023-01-22 | Stop reason: HOSPADM

## 2023-01-17 RX ORDER — INSULIN LISPRO 100 [IU]/ML
INJECTION, SOLUTION INTRAVENOUS; SUBCUTANEOUS
Status: DISCONTINUED | OUTPATIENT
Start: 2023-01-17 | End: 2023-01-19

## 2023-01-17 RX ORDER — ENOXAPARIN SODIUM 100 MG/ML
40 INJECTION SUBCUTANEOUS DAILY
Status: DISCONTINUED | OUTPATIENT
Start: 2023-01-17 | End: 2023-01-22 | Stop reason: HOSPADM

## 2023-01-17 RX ORDER — SODIUM CHLORIDE 0.9 % (FLUSH) 0.9 %
5-40 SYRINGE (ML) INJECTION AS NEEDED
Status: DISCONTINUED | OUTPATIENT
Start: 2023-01-17 | End: 2023-01-22 | Stop reason: HOSPADM

## 2023-01-17 RX ORDER — POLYETHYLENE GLYCOL 3350 17 G/17G
17 POWDER, FOR SOLUTION ORAL DAILY PRN
Status: DISCONTINUED | OUTPATIENT
Start: 2023-01-17 | End: 2023-01-22 | Stop reason: HOSPADM

## 2023-01-17 RX ORDER — IBUPROFEN 200 MG
4 TABLET ORAL AS NEEDED
Status: DISCONTINUED | OUTPATIENT
Start: 2023-01-17 | End: 2023-01-22 | Stop reason: HOSPADM

## 2023-01-17 RX ORDER — IBUPROFEN 200 MG
4 TABLET ORAL AS NEEDED
Status: DISCONTINUED | OUTPATIENT
Start: 2023-01-17 | End: 2023-01-17

## 2023-01-17 RX ORDER — ALLOPURINOL 100 MG/1
100 TABLET ORAL DAILY
Status: DISCONTINUED | OUTPATIENT
Start: 2023-01-17 | End: 2023-01-18 | Stop reason: ALTCHOICE

## 2023-01-17 RX ORDER — SODIUM CHLORIDE 9 MG/ML
150 INJECTION, SOLUTION INTRAVENOUS CONTINUOUS
Status: DISCONTINUED | OUTPATIENT
Start: 2023-01-17 | End: 2023-01-17

## 2023-01-17 RX ADMIN — ASPIRIN 81 MG: 81 TABLET, COATED ORAL at 09:35

## 2023-01-17 RX ADMIN — SODIUM CHLORIDE, PRESERVATIVE FREE 10 ML: 5 INJECTION INTRAVENOUS at 09:43

## 2023-01-17 RX ADMIN — LACTULOSE 30 ML: 20 SOLUTION ORAL at 09:36

## 2023-01-17 RX ADMIN — DEXTROSE AND SODIUM CHLORIDE 150 ML/HR: 5; 900 INJECTION, SOLUTION INTRAVENOUS at 16:23

## 2023-01-17 RX ADMIN — LACTULOSE 30 ML: 20 SOLUTION ORAL at 21:40

## 2023-01-17 RX ADMIN — ALLOPURINOL 100 MG: 100 TABLET ORAL at 09:35

## 2023-01-17 RX ADMIN — SODIUM CHLORIDE 2.7 UNITS/HR: 9 INJECTION, SOLUTION INTRAVENOUS at 16:23

## 2023-01-17 RX ADMIN — ENOXAPARIN SODIUM 40 MG: 100 INJECTION SUBCUTANEOUS at 09:36

## 2023-01-17 RX ADMIN — SODIUM CHLORIDE 6.9 UNITS/HR: 9 INJECTION, SOLUTION INTRAVENOUS at 03:23

## 2023-01-17 RX ADMIN — SODIUM CHLORIDE 1000 ML: 9 INJECTION, SOLUTION INTRAVENOUS at 00:50

## 2023-01-17 RX ADMIN — SODIUM CHLORIDE, PRESERVATIVE FREE 10 ML: 5 INJECTION INTRAVENOUS at 21:40

## 2023-01-17 RX ADMIN — SODIUM CHLORIDE 150 ML/HR: 9 INJECTION, SOLUTION INTRAVENOUS at 03:23

## 2023-01-17 RX ADMIN — LISINOPRIL 40 MG: 20 TABLET ORAL at 09:35

## 2023-01-17 RX ADMIN — DEXTROSE AND SODIUM CHLORIDE 150 ML/HR: 5; 900 INJECTION, SOLUTION INTRAVENOUS at 09:38

## 2023-01-17 RX ADMIN — LACTULOSE 30 ML: 20 SOLUTION ORAL at 03:33

## 2023-01-17 RX ADMIN — Medication 8 UNITS: at 00:45

## 2023-01-17 RX ADMIN — SODIUM CHLORIDE, PRESERVATIVE FREE 10 ML: 5 INJECTION INTRAVENOUS at 04:22

## 2023-01-17 RX ADMIN — DEXTROSE AND SODIUM CHLORIDE 150 ML/HR: 5; 900 INJECTION, SOLUTION INTRAVENOUS at 22:59

## 2023-01-17 RX ADMIN — CEFTRIAXONE SODIUM 1 G: 1 INJECTION, POWDER, FOR SOLUTION INTRAMUSCULAR; INTRAVENOUS at 09:42

## 2023-01-17 NOTE — CONSULTS
INPATIENT NEUROLOGY CONSULTATION  1/17/2023     Consulted by: Peggy Patino MD        Patient ID:  Moses Davies  [id number]  79 y.o.  1955    Chief Complaint   Patient presents with    Shaking    Incontinence       HPI    Arielle Carranza is a 80-year-old gentleman with diabetes and hypertension who came to the hospital because of worsening symptoms. He tells me for about 2 weeks he has had twitching of the upper extremities. No pain. No neck pain radiating. He does not take gabapentin. Since being here the twitching has gone away. Additionally for about 4 weeks he has had urinary incontinence such that he has the urge to urinate but he does not make it to the bathroom in time. He has not had any of this evaluated as an outpatient yet. He feels well at the moment. Evaluation thus far showing an elevated ammonia. EEG was just done. When he has the shaking and twitching there is no mental status change. Review of Systems   Eyes:  Negative for double vision. Genitourinary:  Positive for urgency. Neurological:  Negative for speech change and headaches. Twitching of the arms   All other systems reviewed and are negative.     Past Medical History:   Diagnosis Date    Chronic tophaceous gout     Diabetes (HCC)     Hypertension      Family History   Problem Relation Age of Onset    Other Father         scleroderma    Stroke Other         sibling    Stroke Mother      Social History     Socioeconomic History    Marital status:      Spouse name: Not on file    Number of children: Not on file    Years of education: Not on file    Highest education level: Not on file   Occupational History    Not on file   Tobacco Use    Smoking status: Never    Smokeless tobacco: Not on file   Substance and Sexual Activity    Alcohol use: No    Drug use: Not on file    Sexual activity: Not on file   Other Topics Concern     Service Not Asked    Blood Transfusions Not Asked    Caffeine Concern Not Asked    Occupational Exposure Not Asked    [address] Hazards Not Asked    Sleep Concern Not Asked    Stress Concern Not Asked    Weight Concern Not Asked    Special Diet Not Asked    Back Care Not Asked    Exercise Not Asked    Bike Helmet Not Asked    Seat Belt Not Asked    Self-Exams Not Asked   Social History Narrative    Not on file     Social Determinants of Health     Financial Resource Strain: Not on file   Food Insecurity: Not on file   Transportation Needs: Not on file   Physical Activity: Not on file   Stress: Not on file   Social Connections: Not on file   Intimate Partner Violence: Not on file   Housing Stability: Not on file     Current Facility-Administered Medications   Medication Dose Route Frequency    aspirin delayed-release tablet 81 mg  81 mg Oral DAILY    lisinopriL (PRINIVIL, ZESTRIL) tablet 40 mg  40 mg Oral DAILY    allopurinoL (ZYLOPRIM) tablet 100 mg  100 mg Oral DAILY    sodium chloride (NS) flush 5-40 mL  5-40 mL IntraVENous Q8H    sodium chloride (NS) flush 5-40 mL  5-40 mL IntraVENous PRN    polyethylene glycol (MIRALAX) packet 17 g  17 g Oral DAILY PRN    ondansetron (ZOFRAN ODT) tablet 4 mg  4 mg Oral Q8H PRN    Or    ondansetron (ZOFRAN) injection 4 mg  4 mg IntraVENous Q6H PRN    enoxaparin (LOVENOX) injection 40 mg  40 mg SubCUTAneous DAILY    lactulose (CHRONULAC) 10 gram/15 mL solution 30 mL  30 mL Oral TID    insulin regular (NOVOLIN R, HUMULIN R) 100 Units in 0.9% sodium chloride 100 mL infusion  0-50 Units/hr IntraVENous TITRATE    insulin lispro (HUMALOG) injection   SubCUTAneous TIDAC    glucose chewable tablet 16 g  4 Tablet Oral PRN    glucagon (GLUCAGEN) injection 1 mg  1 mg IntraMUSCular PRN    dextrose 10 % infusion 0-250 mL  0-250 mL IntraVENous PRN    cefTRIAXone (ROCEPHIN) 1 g in 0.9% sodium chloride 10 mL IV syringe  1 g IntraVENous Q24H    dextrose 5% and 0.9% NaCl infusion  150 mL/hr IntraVENous CONTINUOUS     Current Outpatient Medications   Medication Sig aspirin delayed-release 81 mg tablet Take  by mouth daily. allopurinol (ZYLOPRIM) 100 mg tablet Take  by mouth daily. lisinopril (PRINIVIL, ZESTRIL) 40 mg tablet Take 40 mg by mouth daily. No Known Allergies    Visit Vitals  /82   Pulse 88   Temp 98.2 °F (36.8 °C)   Resp 27   Ht 5' 11\" (1.803 m)   Wt 203 lb 11.3 oz (92.4 kg)   SpO2 96%   BMI 28.41 kg/m²     Physical Exam  Vitals and nursing note reviewed. Constitutional:       Appearance: Normal appearance. He is normal weight. Cardiovascular:      Rate and Rhythm: Normal rate. Pulmonary:      Effort: Pulmonary effort is normal.   Neurological:      Mental Status: He is alert. Neurologic Exam     Mental Status   Age-appropriate gentleman awake and alert. Follows commands. Oriented to location date and situation. Pupils equal symmetric reactive midline, EOMI face grossly symmetric on smile tongue is midline speech is clear without aphasia  Strength without drift upper and lower extremities supine. Finger-to-nose intact. Sensation intact grossly.   Gait deferred          Lab Results   Component Value Date/Time    WBC 7.8 01/16/2023 08:07 PM    HGB 13.9 01/16/2023 08:07 PM    HCT 43.6 01/16/2023 08:07 PM    PLATELET 923 05/81/0340 08:07 PM    MCV 86.5 01/16/2023 08:07 PM     Lab Results   Component Value Date/Time    Hemoglobin A1c >14.0 (H) 01/16/2023 08:07 PM    Glucose 271 (H) 01/17/2023 09:28 AM    Glucose (POC) 330 (H) 01/17/2023 10:00 AM    LDL,Direct 54 01/16/2023 08:07 PM    LDL, calculated Not calculated due to elevated triglyceride level 01/16/2023 08:07 PM    Creatinine 1.28 01/17/2023 09:28 AM      Lab Results   Component Value Date/Time    Cholesterol, total 308 (H) 01/16/2023 08:07 PM    HDL Cholesterol 31 01/16/2023 08:07 PM    LDL,Direct 54 01/16/2023 08:07 PM    LDL, calculated Not calculated due to elevated triglyceride level 01/16/2023 08:07 PM    Triglyceride 2,029 (H) 01/16/2023 08:07 PM    CHOL/HDL Ratio 9.9 (H) 01/16/2023 08:07 PM     Lab Results   Component Value Date/Time    ALT (SGPT) 33 01/16/2023 08:07 PM    Alk. phosphatase 87 01/16/2023 08:07 PM    Bilirubin, total 0.5 01/16/2023 08:07 PM    Albumin 3.0 (L) 01/16/2023 08:07 PM    Protein, total 8.6 (H) 01/16/2023 08:07 PM    Ammonia, plasma 54 (H) 01/17/2023 03:00 AM    PLATELET 951 57/86/1019 08:07 PM        CT Results (maximum last 3): Results from [place] encounter on 01/16/23    CT CHEST WO CONT    Narrative  INDICATION: Encephalopathic, evaluate for pneumonia and mass lesion    COMPARISON: Chest radiograph 1/16/2023    TECHNIQUE:  Noncontrast 5 mm axial images were obtained through the chest. CT  dose reduction was achieved through use of a standardized protocol tailored for  this examination and automatic exposure control for dose modulation. FINDINGS:    THYROID: Unremarkable. MEDIASTINUM/JENNIFER: Subcentimeter posterior right paratracheal lymph node. No  significant lymph node enlargement in the chest.  HEART/PERICARDIUM: Unremarkable. LUNGS/PLEURA: Mild bibasilar atelectasis. No pulmonary edema, pleural effusion,  focal consolidation, or suspicious nodule/mass. INCIDENTALLY IMAGED UPPER ABDOMEN: No significant abnormality. BONES: No destructive bone lesion. ADDITIONAL COMMENTS:  N/A. Impression  No significant abnormality. No acute process. CT ABD PELV WO CONT    Narrative  EXAM:  CT ABD PELV WO CONT    INDICATION: Renal failure. Urinary incontinence. COMPARISON: None. TECHNIQUE: Helical CT of the abdomen  and pelvis  without contrast. Coronal and  sagittal reformats are performed. CT dose reduction was achieved through use of  a standardized protocol tailored for this examination and automatic exposure  control for dose modulation. Adaptive statistical iterative reconstruction  (ASIR) was utilized.     FINDINGS:  Solid organ evaluation is limited without contrast.    The visualized lung bases demonstrate no mass or consolidation. The heart size  is normal. There is no pericardial or pleural effusion. There are bilateral nonobstructing renal calculi measuring up to 5 mm on the  left. The kidneys are symmetric without hydronephrosis. There is no perinephric  fluid or fat stranding. The liver, spleen, pancreas, and adrenal glands are normal.  The gall bladder is  present  without intra- or extra-hepatic biliary dilatation. There are no dilated bowel loops. The appendix is normal. There is distal  colonic diverticulosis without focal adjacent inflammation. There are no enlarged lymph nodes. There is no free fluid or free air. The  aorta tapers without aneurysm. The urinary bladder is unremarkable. There is no pelvic mass. The prostate is  not enlarged. There are multilevel degenerative changes in the spine without aggressive bony  lesion. Impression  No acute abnormality in the abdomen or pelvis. Bilateral nonobstructing  nephrolithiasis. MRI Results (maximum last 3): No results found for this or any previous visit. VAS/US/Carotid Doppler Results (maximum last 3): No results found for this or any previous visit. PET Results (maximum last 3): No results found for this or any previous visit. Assessment and Plan        53-year-old gentleman presenting with a couple new concerns getting worse over the past few weeks. He has been having bilateral upper extremity twitching that seems to have improved now. I suspect this is more metabolic. I do not think this is seizure. We will review the EEG. Ammonia is elevated. Defer to primary team.  He has various metabolic abnormalities to include a highly elevated A1c and cholesterol panel. Defer to primary team.  The urinary incontinence does not sound like a central issue. It looks like he might have a mild UTI. He might need to have further evaluation checking the prostate. I am not hearing any red flags for myelopathy.   His strength is intact. We will review the EEG once completed. I do not feel strongly about getting an MRI at this time. Head CT is normal.  I do not think this is stroke. We will follow peripherally for now. If the EEG shows any acute abnormalities we will be following up. Please call if needed. This clinical note was dictated with an electronic dictation software that can make unintentional errors. If there are any questions, please contact me directly for clarification.       [address] [facility],   NEUROLOGIST  Diplomate MELCHORN  1/17/2023

## 2023-01-17 NOTE — H&P
[address]  HISTORY AND PHYSICAL    Name:  Roxanne Basilio  MR#:  [id number]  :  1955  ACCOUNT #:  [de-identified]  ADMIT DATE:  2023      The patient was seen, evaluated, and admitted by me on 2023. PRIMARY CARE PHYSICIAN:  None. SOURCE OF INFORMATION:  The patient who is not a good historian because of his mental status. CHIEF COMPLAINT:  Muscle twitching and urinary incontinence. HISTORY OF PRESENT ILLNESS:  This is a 57-year-old man with past medical history significant for type 2 diabetes, chronic kidney disease, and hypertension; presented at the emergency room with muscle twitching and urinary incontinence. The patient's symptoms have been going on for a couple of months and progressively getting worse. In the last couple of days, the patient has been having frequent spontaneous muscle twitching as well as urinary incontinence. The patient is alert with each episode. The patient has no prior history of seizure disorder. The patient has type 2 diabetes. Has not been taking his medication for a while. When the patient arrived at the emergency room, he was found to have elevated ammonia level. The patient was very slow in responding and answering questions. No history of liver disease. His blood sugar was also markedly elevated. The patient was subsequently referred to the hospitalist service for admission. No record of prior admission to this hospital.  No associated fever, rigors, or chills. PAST MEDICAL HISTORY:  1. Type 2 diabetes. 2.  Chronic kidney disease. 3.  Hypertension. 4.  Gout. ALLERGIES:  NO KNOWN DRUG ALLERGIES. MEDICATIONS:  1. Allopurinol 100 mg daily. 2.  Aspirin 81 mg daily. 3.  Lisinopril 40 mg daily. 4.  Glucophage, the dosage is not known. FAMILY HISTORY:  This was reviewed. His father had scleroderma. His mother had stroke.     PAST SURGICAL HISTORY:  This is significant for bilateral amputation of fifth toes.    SOCIAL HISTORY:  No history of alcohol or tobacco abuse. REVIEW OF SYSTEMS:  HEAD, EYES, EARS, NOSE, AND THROAT:  This is positive for confusion. No headache, no blurring of vision, no photophobia. RESPIRATORY SYSTEM:  No cough, no shortness of breath, no hemoptysis. CARDIOVASCULAR SYSTEM:  No chest pain, no orthopnea, no palpitations. GASTROINTESTINAL SYSTEM:  No nausea or vomiting. No diarrhea, no constipation. GENITOURINARY SYSTEM:  No dysuria, no urgency, no frequency. All other systems are reviewed and they are negative. PHYSICAL EXAMINATION:  GENERAL APPEARANCE:  The patient appeared ill, in moderate distress. VITAL SIGNS:  On arrival at the emergency room; temperature 98, pulse 100, respiratory rate 16, blood pressure 124/83, oxygen saturation 97% on room air. HEENT:  Head:  Normocephalic, atraumatic. Eyes:  Normal eye movement. No redness, no drainage, no discharge. Ears:  Normal external ears with no evidence of drainage. Nose:  No deformity, no drainage. Mouth and Throat:  No visible oral lesion. NECK:  Neck is supple. No JVD, no thyromegaly. CHEST:  Clear breath sounds. No wheezing, no crackles. HEART:  Normal S1 and S2, regular. No clinically appreciable murmur. ABDOMEN:  Soft, nontender. Normal bowel sounds. CNS:  Alert and oriented x3. Confusion and slow response to questions are noted. EXTREMITIES:  No edema. Pulses 2+ bilaterally. MUSCULOSKELETAL SYSTEM:  No obvious joint deformity and swelling. SKIN:  No active skin lesions seen in the exposed part of the body. PSYCHIATRY:  Normal mood and affect. LYMPHATIC SYSTEM:  No cervical lymphadenopathy. DIAGNOSTIC DATA:  CT scan of the head without contrast:  No acute intracranial process. EKG shows normal sinus rhythm and nonspecific ST and T-waves abnormalities. LABORATORY DATA:  Hematology:  WBC 7.8, hemoglobin 13.9, hematocrit 43.6, platelets 428. Ammonia level 110.   Chemistry:  Sodium 125, potassium 4.3, chloride 90, CO2 of 28, glucose 607, BUN 25, creatinine 1.70, calcium 10.0, total bilirubin 0.5, ALT 33, AST 24, alkaline phosphatase 87, total protein 8.6, albumin level 3.0, globulin 5.6. ASSESSMENT:  1. Acute hepatic encephalopathy. 2.  Type 2 diabetes with hyperosmolar nonketotic hyperglycemia. 3.  Hyponatremia. 4.  Chronic kidney disease stage III. 5.  Hypertension. 6.  Suspected seizure disorder. 7.  Urinary incontinence. PLAN:  1. Acute hepatic encephalopathy:  This is the most likely cause of the patient's confusion. The patient has no history of liver disease. We will start the patient on lactulose and repeat ammonia level. Hepatology consult will be requested to assist in further evaluation and treatment. We will evaluate the patient for other possible causes of encephalopathy. We will check Q45 and folic acid level. We will check acetaminophen level. We will check salicylate level. We will check urine drug screen. We will obtain CT scan of the chest to evaluate the patient for pneumonia as a possible cause of encephalopathy. 2.  Type 2 diabetes with hyperosmolar nonketotic hyperglycemia: We will start the patient on insulin drip as per protocol. We will check hemoglobin A1c level. This is most likely due to noncompliance. 3.  Hyponatremia: This is relative hyponatremia and it is due to the patient's hyperglycemia. We will carry out fluid therapy and monitor the patient's sodium level closely. 4.  Chronic kidney disease stage III:  We will carry out fluid therapy and monitor the patient's renal function closely. 5.  Hypertension: We will resume preadmission medication. We will monitor the patient's blood pressure closely. 6.  Suspected seizure disorder:  The patient presented with abnormal twitching of the body associated with urinary incontinence. CT scan of the head is negative for acute pathology. We will obtain MRI of the brain for further evaluation.   We will also obtain EEG to evaluate the patient for seizure. Neurology consult will be requested to assist in further evaluation and treatment. 7.  Urinary incontinence: We will check CT scan of the abdomen and pelvis for obstructive lesion. The patient will require Urology consult if the CT scan of the abdomen and pelvis is abnormal.    OTHER ISSUES:  Code status: The patient is a full code. We will place the patient on Lovenox for DVT prophylaxis. FUNCTIONAL STATUS PRIOR TO ADMISSION:  The patient came from home. The patient is ambulatory with no assistive device. COVID PRECAUTION:  The patient was wearing a face mask. I was wearing a face mask and gloves for this patient's encounter.         Gilberto Hill MD      [id number]  D:  01/17/2023 4:51  T:  01/17/2023 6:15  JOB #:  [id number]

## 2023-01-17 NOTE — ED NOTES
Bedside shift change report given to perla (oncoming nurse) by Myrna Dorado (offgoing nurse). Report included the following information SBAR and ED Summary.

## 2023-01-17 NOTE — ED NOTES
Bedside and Verbal shift change report given to Shawna Romero RN (oncoming nurse) by Fouzia Raines RN (offgoing nurse). Report included the following information SBAR, ED Summary, MAR and Recent Results.

## 2023-01-17 NOTE — PROGRESS NOTES
Care Management:    Transition of Care Plan:     RUR: 6%  1st IM given: 1-16-23  Disposition: home  Transportation: wife    Met with patient in the room. Demographics confirmed: yes  Living Situation: Patient lives with his wife in a 2 level home with 5 steps to enter. The bedrooms are on 2nd floor. DME: crutches  ADLs: independent, drives  Ph[facility]acy: [facility]  Previous HH/SNF/rehab: outpatient PT  Insurance: Medicare,   Transportation: self, wife  Physician: The only doctor patient sees is a rheumatologist once a year. He could not remember his name. Reason for Admission:  hepatic encephalopathy                   RUR Score:          6%           Plan for utilizing home health:      none    PCP: First and Last name:  None Patient does not have a PCP. Current Advanced Directive/Advance Care Plan: Full Code  Patient does not have an AMD and does not wish to complete them at this time. Healthcare Decision Maker:              Primary Decision Maker: Lito Bahena Spouse - [phone]                  Care Management Interventions  PCP Verified by CM: Yes (Patient does not have a PCP.)  Palliative Care Criteria Met (RRAT>21 & CHF Dx)?: No  Mode of Transport at Discharge:  Other (see comment)  Discharge Durable Medical Equipment: No  Physical Therapy Consult: No  Occupational Therapy Consult: No  Speech Therapy Consult: No  Support Systems: Spouse/Significant Other, Child(joceline)  Confirm Follow Up Transport: Self   Resource Information Provided?: No  Discharge Location  Patient Expects to be Discharged to[de-identified] [facility]

## 2023-01-17 NOTE — PROGRESS NOTES
[facility]  Hospitalist Group                                                                                          Hospitalist Progress Note  Susan Sanchez MD  Answering service: [phone] from in house phone        Date of Service:  2023  NAME:  Anthony Jones  :  1955  MRN:  [id number]      Admission Summary: This is a 77-year-old man with past medical history significant for type 2 diabetes, chronic kidney disease, and hypertension; presented at the emergency room with muscle twitching and urinary incontinence. The patient's symptoms have been going on for a couple of months and progressively getting worse. In the last couple of days, the patient has been having frequent spontaneous muscle twitching as well as urinary incontinence. The patient is alert with each episode. The patient has no prior history of seizure disorder and denies any history of DM. He does not have a PCP. Upon arrival, patient was lethargic, had an elevated Ammonia level, and a blood glucose of 552. He was started on an Insulin drip on admission. He was admitted for further work-up and treatment. Interval history / Subjective:   Patient seen and examined in ED. He denies any history of DM. He states that his HgA1c had been elevated in the past (\"in the 6's\"), and he was instructed to check his blood glucose, so he knows how to check his blood glucose and inject Insulin, but does not take any medications for it. He was told he has \"pre-diabetes\". In the ED, he is asleep, easily wakes up and answers my questions. He does not have a PCP, so has not taken any of his medications \"in a while\"; he got his last refill at an urgent care clinic. He denies drinking alcohol.       Assessment & Plan:        Acute metabolic encephalopathy:   - dehydration vs hepatic encephalopathy;   - no history of liver disease;   - started lactulose;   - repeat ammonia level: 110 on admission --> 54;   - B12 within normal range;   - folic acid level within normal range;    - drug screen negative;     Type 2 diabetes with hyperosmolar nonketotic hyperglycemia:   - new diagnosis of DM;   - Appreciate DM team consult and recommendations;  - continue Insulin drip today;   - transition to Lantus and Lispro SSI;   - HgA1c >14;    Hyponatremia:    - hypovolemic;  - IVFs and monitor;     Elevated Creatinine:  - OKSANA vs CKD;   - IVFs and trend; Hypertension:    - patient has taken Lisinopril in the past, but does not have a PCP and has no refills, has not taken any medications \"in a while\"; Tremors:  - concerning for seizure activity;  - Neuro consulted;     Urinary incontinence:    - most likely due to UTI;   - CT scan of the abdomen and pelvis to check for obstruction; Code status: Full   Prophylaxis: B SCD's  Care Plan discussed with: patient  Anticipated Disposition: home in 2-3 days; Hospital Problems  Date Reviewed: 1/17/2023            Codes Class Noted POA    * (Principal) Type 2 diabetes mellitus with hyperosmolar nonketotic hyperglycemia (HCC) ICD-10-CM: E11.00  ICD-9-CM: 250.20  1/17/2023 Yes        Acute hepatic encephalopathy ICD-10-CM: K76.82  ICD-9-CM: 572.2  1/16/2023 Unknown             Review of Systems:   A comprehensive review of systems was negative except for that written in the HPI. Vital Signs:    Last 24hrs VS reviewed since prior progress note.  Most recent are:  Visit Vitals  BP (!) 135/95   Pulse 91   Temp 98.1 °F (36.7 °C)   Resp 23   Ht 5' 11\" (1.803 m)   Wt 92.4 kg (203 lb 11.3 oz)   SpO2 94%   BMI 28.41 kg/m²         Intake/Output Summary (Last 24 hours) at 1/17/2023 1450  Last data filed at 1/17/2023 2943  Gross per 24 hour   Intake 1000 ml   Output --   Net 1000 ml        Physical Examination:     I had a face to face encounter with this patient and independently examined them on 1/17/2023 as outlined below:          Constitutional:  No acute distress, cooperative, pleasant    ENT:  Oral mucosa moist, oropharynx benign. Resp:  CTA bilaterally. No wheezing/rhonchi/rales. No accessory muscle use. CV:  Regular rhythm, normal rate, no murmurs, gallops, rubs    GI:  Soft, non distended, non tender. normoactive bowel sounds, no hepatosplenomegaly     Musculoskeletal:  No edema, warm, 2+ pulses throughout    Neurologic:  Moves all extremities. AAOx3, CN II-XII reviewed            Data Review:    Review and/or order of clinical lab test  Review and/or order of tests in the radiology section of CPT  Review and/or order of tests in the medicine section of CPT      Labs:     Recent Labs     01/16/23 2007   WBC 7.8   HGB 13.9   HCT 43.6        Recent Labs     01/17/23  0928 01/17/23  0248 01/16/23 2007   * 130* 125*   K 4.3 4.0 4.3    100 90*   CO2 27 23 28   BUN 26* 24* 25*   CREA 1.28 1.35* 1.70*   * 411* 607*   CA 9.5 8.8 10.0   MG  --   --  2.3   PHOS  --   --  3.2     Recent Labs     01/16/23 2007   ALT 33   AP 87   TBILI 0.5   TP 8.6*   ALB 3.0*   GLOB 5.6*     No results for input(s): INR, PTP, APTT, INREXT in the last 72 hours. No results for input(s): FE, TIBC, PSAT, FERR in the last 72 hours. Lab Results   Component Value Date/Time    Folate 9.0 01/16/2023 08:07 PM      No results for input(s): PH, PCO2, PO2 in the last 72 hours. No results for input(s): CPK, CKNDX, TROIQ in the last 72 hours.     No lab exists for component: CPKMB  Lab Results   Component Value Date/Time    Cholesterol, total 308 (H) 01/16/2023 08:07 PM    HDL Cholesterol 31 01/16/2023 08:07 PM    LDL,Direct 54 01/16/2023 08:07 PM    LDL, calculated Not calculated due to elevated triglyceride level 01/16/2023 08:07 PM    Triglyceride 2,029 (H) 01/16/2023 08:07 PM    CHOL/HDL Ratio 9.9 (H) 01/16/2023 08:07 PM     Lab Results   Component Value Date/Time    Glucose (POC) 264 (H) 01/17/2023 02:17 PM    Glucose (POC) 327 (H) 01/17/2023 01:14 PM Glucose (POC) 383 (H) 01/17/2023 12:11 PM    Glucose (POC) 379 (H) 01/17/2023 11:04 AM    Glucose (POC) 330 (H) 01/17/2023 10:00 AM     Lab Results   Component Value Date/Time    Color YELLOW/STRAW 01/17/2023 07:15 AM    Appearance CLOUDY (A) 01/17/2023 07:15 AM    Specific gravity 1.010 01/17/2023 07:15 AM    pH (UA) 6.0 01/17/2023 07:15 AM    Protein TRACE (A) 01/17/2023 07:15 AM    Glucose >1,000 (A) 01/17/2023 07:15 AM    Ketone Negative 01/17/2023 07:15 AM    Bilirubin Negative 01/17/2023 07:15 AM    Urobilinogen 0.2 01/17/2023 07:15 AM    Nitrites Positive (A) 01/17/2023 07:15 AM    Leukocyte Esterase SMALL (A) 01/17/2023 07:15 AM    Epithelial cells FEW 01/17/2023 07:15 AM    Bacteria 1+ (A) 01/17/2023 07:15 AM    WBC 20-50 01/17/2023 07:15 AM    RBC 0-5 01/17/2023 07:15 AM         Medications Reviewed:     Current Facility-Administered Medications   Medication Dose Route Frequency    aspirin delayed-release tablet 81 mg  81 mg Oral DAILY    lisinopriL (PRINIVIL, ZESTRIL) tablet 40 mg  40 mg Oral DAILY    allopurinoL (ZYLOPRIM) tablet 100 mg  100 mg Oral DAILY    sodium chloride (NS) flush 5-40 mL  5-40 mL IntraVENous Q8H    sodium chloride (NS) flush 5-40 mL  5-40 mL IntraVENous PRN    polyethylene glycol (MIRALAX) packet 17 g  17 g Oral DAILY PRN    ondansetron (ZOFRAN ODT) tablet 4 mg  4 mg Oral Q8H PRN    Or    ondansetron (ZOFRAN) injection 4 mg  4 mg IntraVENous Q6H PRN    enoxaparin (LOVENOX) injection 40 mg  40 mg SubCUTAneous DAILY    lactulose (CHRONULAC) 10 gram/15 mL solution 30 mL  30 mL Oral TID    cefTRIAXone (ROCEPHIN) 1 g in 0.9% sodium chloride 10 mL IV syringe  1 g IntraVENous Q24H    dextrose 5% and 0.9% NaCl infusion  150 mL/hr IntraVENous CONTINUOUS    insulin regular (NOVOLIN R, HUMULIN R) 100 Units in 0.9% sodium chloride 100 mL infusion  0-50 Units/hr IntraVENous TITRATE    insulin lispro (HUMALOG) injection   SubCUTAneous TIDAC    glucose chewable tablet 16 g  4 Tablet Oral PRN glucagon (GLUCAGEN) injection 1 mg  1 mg IntraMUSCular PRN    dextrose 10 % infusion 0-250 mL  0-250 mL IntraVENous PRN     Current Outpatient Medications   Medication Sig    aspirin delayed-release 81 mg tablet Take  by mouth daily. allopurinol (ZYLOPRIM) 100 mg tablet Take  by mouth daily. lisinopril (PRINIVIL, ZESTRIL) 40 mg tablet Take 40 mg by mouth daily.        ______________________________________________________________________  EXPECTED LENGTH OF STAY: - - -  ACTUAL LENGTH OF STAY:          1                 Gabriele Fox MD

## 2023-01-17 NOTE — DIABETES MGMT
[facility]  DIABETES MANAGEMENT CONSULT    Consulted by Provider for advanced nursing evaluation and care for inpatient blood glucose management. Evaluation and Action Plan   Minetta Litten is a 80 yo male admitted 1/16/23 with muscle twitching (arms) and urinary incontinence. HHS noted on lab work -BG >600/ triglycerides >2000/ negative ketones. Ammonia level >100. Insulin infusion initiated at admission along with IVF. Re: recent diabetes mgmt, he reports BG >300 consistently and he took himself off Metformin as \"it was not working\" No PCP follow up per his report. He has had bilateral toe amputations in the past , so already experiencing long term complications from uncontrolled diabetes-    Since triglycerides remain elevated and >2000, would recommend continuing insulin infusion until triglycerides <1000. Will add dextrose to IVF to maintain steady BG levels while triglycerides normalize. Once normalized, will transition patient off insulin infusion to subcutaneous insulin injection per recs below. Noted CMP ordered for AM 1/18/23 which will have triglyceride level in it. Conferred with hospitalist, Dr. Aden Thompson, re: recs. 1430: Noted BG trends increased since dextrose added to IVF and patient consuming PO food diet. Was still on HHS profile of 200-300 on glucostabilizer. Insulin infusion will be continued to reduce elevated triglycerides. Adjusted profile in glucostabilizer to 'other hyperglycemia, 140-180' to assist with tighter BG control - Re-ordered insulin infusion to match \"other hyperglycemia 140-180' -     Management Rationale Action Plan   Medication  Continue insulin infusion per DKA/HHS protocol  POC glucose hourly, BMP Q4h on insulin infusion  Add dextrose in IVF once glucose under 250 on insulin infusion    When ready to convert  Initiate the subcutaneous insulin order set with:  Basal insulin: 0.3 units/kg/day=25 units Lantus dialy.   First dose now then turn off insulin gtt two hours later  Correctional insulin ACHS at normal sensitivity, start at a glucose of 200  Consistent Carbohydrate diet  Adjust to non-dextrose containing IVF once triglycerides <1000  If patient is experiencing pre-prandial hyperglycemia over 200 on basal and correctional insulin, add Low dose bolus insulin. 0.05 units/kg/meal=4 units Humalog TID  . Hold if patient consumes less than 50% of carbohydrates on meal tray    Additional orders          Discharge planning   Medication  Will require T2D medication to manage uncontrolled diabetes. He would not like to be on insulin unless it was \"last resort\"-Recommendations TBD- consider GLP-1 weekly, Glipizide with meals, would still benefit greatly from insulin therapy. Referral  [x]        Outpatient diabetes education   Additional orders            Initial Presentation   Alexia Fierro is a 79 y.o. male admitted 1/16/23 after experiencing  muscle twitching and urinary incontinence. Per patient over the last 1 to 2 months he has been having worsening urinary incontinence. States for the last 3 to 4 days he has been having muscle twitching and spontaneous a movements; he remains awake during the episode. LAB: BG >600/ anion gap 6/ negative ketones /ammonia 110/ triglyceride >2000/   CXR: No acute intrathoracic disease. CT head: no acute intracranial process      HX:   Past Medical History:   Diagnosis Date    Chronic tophaceous gout     Diabetes ([facility] [address]     Hypertension         INITIAL DX:   Acute hepatic encephalopathy [K76.82]     Current Treatment     TX: IVF/ insulin infusion/neuro checks    Hospital Course   Clinical progress has been complicated by acute hepatic encephalopathy in absence of liver disease? /found to be in [facility] on admission and hypertriglyceridemia (>2000) . Diabetes History   T2D, current A1C >14%- ED MD note states he was taken off Metformin at some point. No consistent diabetes follow up noted. Diabetes-related Medical History  Acute complications  HHNK  Neurological complications  Peripheral neuropathy  Microvascular disease  none  Macrovascular disease  Foot wounds  Other associated conditions     Gout/ HTN    Diabetes Medication History: Previously prescribed Metformin-took himself off 'months ago'  Key Antihyperglycemic Medications       Patient is on no antihyperglycemic meds. Diabetes self-management practices:   Eating pattern   [x] Eating a carbohydrate-controlled meal plan  [x] Breakfast  eggs  [x] Lunch   PB&J sandwich  [x] Dinner   Some soret of crock pot meal/ bean soup   [x] Beverages  Water/ 1/2 Dr. Jack Conroy daily   Physical activity pattern-not discussed     Monitoring pattern- Glucometer- BG have been 300s per his report     Taking medications pattern  [x] Consistent administration  [x] Affordable  Social determinants of health impacting diabetes self-management practices   Concerned that you need to know more about how to stay healthy with diabetes  Overall evaluation:    [x] A1c ABOVE target without drug therapy & lacking self-care practices    Subjective   The metformin was not working.  Endorses no PCP follow up-  Discussed concern over elevated A1C and the long term complications associated with chronic uncontrolled diabetes. Discussed needing insulin due to elevated A1C and he is resistant to it unless \"it's last resort\"- He would like to try \"other medications first\"      Retired [employer] after serving [address]       Objective   Physical exam  General Over weight male in no acute distress/. Conversant and cooperative  Neuro  Alert, oriented   Vital Signs Visit Vitals  /87   Pulse 81   Temp 98.2 °F (36.8 °C)   Resp 27   Ht 5' 11\" (1.803 m)   Wt 92.4 kg (203 lb 11.3 oz)   SpO2 95%   BMI 28.41 kg/m²     Skin  Warm and dry. Heart   Regular rate and rhythm.  No murmurs, rubs or gallops  Lungs  Clear to auscultation without rales or rhonchi  Extremities No foot wounds    Diabetic foot exam:    Left Foot-5th toe amputation PTA years ago     Visual Exam: normal    Pulse DP: 2+ (normal)   Filament test: reduced sensation      Right Foot-5th toe amputation PTA years ago   Visual Exam: normal    Pulse DP: 2+ (normal)   Filament test: reduced sensation     DP & PT pulses +2.      Laboratory  Recent Labs     01/17/23  0248 01/16/23 2007   * 607*   AGAP 7 7   TRIGL  --  2,029*   WBC  --  7.8   CREA 1.35* 1.70*   AST  --  24   ALT  --  33       Factors impacting BG management  Factor Dose Comments   Nutrition:  Standard meals     45 grams/meal      Other:   Kidney function  Liver function     eGFR >60, cr+ 1.35  AST/ALT WNL  Ammonia >100      Blood glucose pattern    Significant diabetes-related events over the past 24-72 hours  T2D, HHS- current  A1C >14%  Insulin infusion via glucostabilizer    Assessment and Nursing Intervention   Nursing Diagnosis Risk for unstable blood glucose pattern   Nursing Intervention Domain 5250 Decision-making Support   Nursing Interventions Examined current inpatient diabetes/blood glucose control   Explored factors facilitating and impeding inpatient management  Explored corrective strategies with patient and responsible inpatient provider   Informed patient of rational for insulin strategy while hospitalized     Nursing Diagnosis 06383 Ineffective Health Management   Nursing Intervention Domain 5250 Decision-making Support   Nursing Interventions Identified diabetes self-management practices impeding diabetes control  Discussed diabetes survival skills related to  Healthy Plate eating plan; given handouts  Role of physical activity in improving insulin sensitivity and action  Procedure for blood glucose monitoring & options for low-cost products  Medications plan at discharge     Billing Code(s)   49301     Before making these care recommendations, I personally reviewed the hospitalization record, including notes, laboratory & diagnostic data and current medications, and examined the patient at the bedside (circumstances permitting) before determining care. More than fifty (50) percent of the time was spent in patient counseling and/or care coordination.   Total minutes: [address], CNS  Diabetes Clinical Nurse Specialist  [facility]  Access via [address] [place] [address]

## 2023-01-17 NOTE — ED PROVIDER NOTES
HPI     Date of Service:  1/16/2023    Patient:  Patrick Spencer    Chief Complaint:  Shaking and Incontinence       HPI:  Patrick Spencer is a 79 y.o.  male with a past medical history of HTN, DM (previously on metformin, no longer taking medication) who presents for evaluation of muscle twitching and urinary incontinence. Per patient over the last 1 to 2 months he has been having worsening urinary incontinence. States for the last 3 to 4 days he has been having muscle twitching and spontaneous a movements; he remains awake during the episode. He denies any associated headaches, weakness or paresthesias. No back pain or back injury. Denies any recent illness. Patient denies any alcohol use. Per family, he also has been slower to respond and somewhat confused from baseline.     Past Medical History:   Diagnosis Date    Chronic tophaceous gout     Diabetes ([facility] [address]     Hypertension        Past Surgical History:   Procedure Laterality Date    HX AMPUTATION TOE      HX ORTHOPAEDIC      removal gouty tophus    [place] EXCISION CHALAZION SINGLE           Family History:   Problem Relation Age of Onset    Other Father         scleroderma    Stroke Other         sibling    Stroke Mother        Social History     Socioeconomic History    Marital status:      Spouse name: Not on file    Number of children: Not on file    Years of education: Not on file    Highest education level: Not on file   Occupational History    Not on file   Tobacco Use    Smoking status: Never    Smokeless tobacco: Not on file   Substance and Sexual Activity    Alcohol use: No    Drug use: Not on file    Sexual activity: Not on file   Other Topics Concern     Service Not Asked    Blood Transfusions Not Asked    Caffeine Concern Not Asked    Occupational Exposure Not Asked    Hobby Hazards Not Asked    Sleep Concern Not Asked    Stress Concern Not Asked    Weight Concern Not Asked    Special Diet Not Asked    Back Care Not Asked Exercise Not Asked    Bike Helmet Not Asked    Seat Belt Not Asked    Self-Exams Not Asked   Social History Narrative    Not on file     Social Determinants of Health     Financial Resource Strain: Not on file   Food Insecurity: Not on file   Transportation Needs: Not on file   Physical Activity: Not on file   Stress: Not on file   Social Connections: Not on file   Intimate Partner Violence: Not on file   Housing Stability: Not on file         ALLERGIES: Patient has no known allergies. Review of Systems   Constitutional:  Negative for chills and fever. HENT:  Negative for congestion and rhinorrhea. Eyes:  Negative for discharge and redness. Respiratory:  Negative for cough and shortness of breath. Cardiovascular:  Negative for chest pain. Gastrointestinal:  Negative for abdominal pain, diarrhea, nausea and vomiting. Genitourinary:  Negative for dysuria and hematuria.        +incontinence    Musculoskeletal:  Negative for back pain and neck pain. Neurological:  Positive for tremors. Negative for speech difficulty, weakness, numbness and headaches. Psychiatric/Behavioral:  Positive for confusion. Negative for agitation. Vitals:    01/16/23 1945   BP: 124/83   Pulse: 100   Resp: 16   Temp: 98 °F (36.7 °C)   SpO2: 97%            Physical Exam  Vitals and nursing note reviewed. Constitutional:       General: He is in acute distress. Appearance: Normal appearance. He is not ill-appearing. HENT:      Head: Normocephalic and atraumatic. Mouth/Throat:      Mouth: Mucous membranes are dry. Eyes:      General: No scleral icterus. Extraocular Movements: Extraocular movements intact. Conjunctiva/sclera: Conjunctivae normal.      Pupils: Pupils are equal, round, and reactive to light. Cardiovascular:      Rate and Rhythm: Normal rate and regular rhythm. Pulses: Normal pulses. Heart sounds: Normal heart sounds.    Pulmonary:      Effort: Pulmonary effort is normal. No respiratory distress. Breath sounds: Normal breath sounds. Abdominal:      General: Abdomen is flat. Palpations: Abdomen is soft. Tenderness: There is no abdominal tenderness. There is no guarding or rebound. Musculoskeletal:         General: Normal range of motion. Right lower leg: No edema. Left lower leg: No edema. Skin:     General: Skin is warm and dry. Capillary Refill: Capillary refill takes less than 2 seconds. Neurological:      General: No focal deficit present. Mental Status: He is alert and oriented to person, place, and time. Comments: Somewhat slow to answer questions, + intermittent extremity twitching. Mild asterixis   Psychiatric:         Mood and Affect: Mood normal.         Behavior: Behavior normal.        Medical Decision Making      DECISION MAKING:  Tayler Gale is a 79 y.o. male who comes in as above. On arrival to the emergency department vital signs are stable. On my examination, patient is awake and alert, somewhat slow to answer questions but is oriented x4. He does have some intermittent muscle twitching on exam.  Differential diagnosis includes, but not limited to, hyperglycemia, DKA, HHS, electrolyte abnormality, intracranial mass, normal pressure hydrocephalus. CBC without leukocytosis or anemia. Patient is hyperglycemic at 607, with resultant pseudohyponatremia 125. Electrolytes are otherwise stable. BUN and creatinine are elevated at 25 and 1.7, respectively, this is consistent with his baseline kidney function. Ammonia is elevated at 110. LFTs are within normal limits. On discussion with patient and his wife, they deny any alcohol use history. CT imaging of the head is negative for any acute intracranial process. Chest x-ray unremarkable. Patient was ordered 1 L of IV fluids and 8 units of subcu insulin for his hyperglycemia. I discussed results with patient and his wife. Discussed plan for admission.   They are in agreement. I subsequently discussed his case with the hospitalist, Dr. Zion Paris, for admission. Perfect Serve Consult for Admission  10:00 PM    ED Room Number: Room/bed info not found  Patient Name and age:  Nav Batista 79 y.o.  male  Working Diagnosis: Hepatic encephalopathy  (primary encounter diagnosis)  Urinary incontinence, unspecified type  Hyperglycemia    COVID-19 Suspicion:  no  Sepsis present:  no  Reassessment needed: no  Code Status:  Full Code  Readmission: no  Isolation Requirements:  no  Recommended Level of Care:  med/surg  Department:[facility] Adult ED - 21   Other:  79 y.o.  male with a past medical history of HTN, DM (previously on metformin, no longer taking medication) who presents for evaluation of muscle twitching and urinary incontinence. Urinary incontinence has been present for the last 1 to 2 months, not necessarily new. Developed involuntary movements and muscle twitching for the last 3 to 4 days, remains conscious during these and does not necessarily sound like seizure activity. On my examination he is awake, answers questions but is slow to respond to the questions. Does have some intermittent muscle twitching. Otherwise neurologically intact. Work-up notable for hyperglycemia at 607 with pseudohyponatremia at 125. He was previously on metformin but no longer taking. Patient ordered IV fluids and 8 units of insulin. He was also found to have hyperammonemia at 110. Denies h/o cirrhosis or ETOH. Amount and/or Complexity of Data Reviewed  Labs: ordered. ECG/medicine tests:  Decision-making details documented in ED Course. Risk  OTC drugs. Decision regarding hospitalization. ED Course as of 01/16/23 2145 Mon Jan 16, 2023 2036 EKG 12 LEAD INITIAL  ECG at 2007, interpreted by me: Normal sinus rhythm, rate 100 bpm.  Left axis deviation. Normal intervals. Left anterior fascicular block. No ST elevations.  [SH]      ED Course User Index  [SH] Vani Reis,        Procedures      LABS:  Recent Results (from the past 6 hour(s))   GLUCOSE, POC    Collection Time: 01/16/23  8:02 PM   Result Value Ref Range    Glucose (POC) 552 (H) 65 - 117 mg/dL    Performed by Bree Schneider  [facility]    CBC WITH AUTOMATED DIFF    Collection Time: 01/16/23  8:07 PM   Result Value Ref Range    WBC 7.8 4.1 - 11.1 K/uL    RBC 5.04 4.10 - 5.70 M/uL    HGB 13.9 12.1 - 17.0 g/dL    HCT 43.6 36.6 - 50.3 %    MCV 86.5 80.0 - 99.0 FL    MCH 27.6 26.0 - 34.0 PG    MCHC 31.9 30.0 - 36.5 g/dL    RDW 14.8 (H) 11.5 - 14.5 %    PLATELET 880 022 - 299 K/uL    MPV 11.3 8.9 - 12.9 FL    NRBC 0.0 0  WBC    ABSOLUTE NRBC 0.00 0.00 - 0.01 K/uL    NEUTROPHILS 76 (H) 32 - 75 %    LYMPHOCYTES 13 12 - 49 %    MONOCYTES 9 5 - 13 %    EOSINOPHILS 1 0 - 7 %    BASOPHILS 1 0 - 1 %    IMMATURE GRANULOCYTES 0 0.0 - 0.5 %    ABS. NEUTROPHILS 6.0 1.8 - 8.0 K/UL    ABS. LYMPHOCYTES 1.0 0.8 - 3.5 K/UL    ABS. MONOCYTES 0.7 0.0 - 1.0 K/UL    ABS. EOSINOPHILS 0.1 0.0 - 0.4 K/UL    ABS. BASOPHILS 0.1 0.0 - 0.1 K/UL    ABS. IMM. GRANS. 0.0 0.00 - 0.04 K/UL    DF AUTOMATED     METABOLIC PANEL, COMPREHENSIVE    Collection Time: 01/16/23  8:07 PM   Result Value Ref Range    Sodium 125 (L) 136 - 145 mmol/L    Potassium 4.3 3.5 - 5.1 mmol/L    Chloride 90 (L) 97 - 108 mmol/L    CO2 28 21 - 32 mmol/L    Anion gap 7 5 - 15 mmol/L    Glucose 607 (HH) 65 - 100 mg/dL    BUN 25 (H) 6 - 20 MG/DL    Creatinine 1.70 (H) 0.70 - 1.30 MG/DL    BUN/Creatinine ratio 15 12 - 20      eGFR 44 (L) >60 ml/min/1.73m2    Calcium 10.0 8.5 - 10.1 MG/DL    Bilirubin, total 0.5 0.2 - 1.0 MG/DL    ALT (SGPT) 33 12 - 78 U/L    AST (SGOT) 24 15 - 37 U/L    Alk.  phosphatase 87 45 - 117 U/L    Protein, total 8.6 (H) 6.4 - 8.2 g/dL    Albumin 3.0 (L) 3.5 - 5.0 g/dL    Globulin 5.6 (H) 2.0 - 4.0 g/dL    A-G Ratio 0.5 (L) 1.1 - 2.2     SAMPLES BEING HELD    Collection Time: 01/16/23  8:07 PM   Result Value Ref Range    SAMPLES BEING HELD 1BLU, 1RED     COMMENT        Add-on orders for these samples will be processed based on acceptable specimen integrity and analyte stability, which may vary by analyte. AMMONIA    Collection Time: 01/16/23  8:07 PM   Result Value Ref Range    Ammonia, plasma 110 (H) <32 UMOL/L   EKG, 12 LEAD, INITIAL    Collection Time: 01/16/23  8:07 PM   Result Value Ref Range    Ventricular Rate 100 BPM    Atrial Rate 100 BPM    P-R Interval 148 ms    QRS Duration 90 ms    Q-T Interval 328 ms    QTC Calculation (Bezet) 423 ms    Calculated P Axis 25 degrees    Calculated R Axis -63 degrees    Calculated T Axis 13 degrees    Diagnosis       Normal sinus rhythm  Left anterior fascicular block  Possible Lateral infarct , age undetermined  Abnormal ECG  No previous ECGs available          IMAGING:  XR CHEST PA LAT   Final Result   No acute intracranial process. Clinical history: AMS   INDICATION:   AMS   COMPARISON: None      FINDINGS:    PA and lateral views of the chest are obtained. The cardiopericardial silhouette is within normal limits. There is no pleural   effusion, pneumothorax or focal consolidation present. IMPRESSION: No acute intrathoracic disease. CT HEAD WO CONT   Final Result   No acute intracranial process. Clinical history: AMS   INDICATION:   AMS   COMPARISON: None      FINDINGS:    PA and lateral views of the chest are obtained. The cardiopericardial silhouette is within normal limits. There is no pleural   effusion, pneumothorax or focal consolidation present. IMPRESSION: No acute intrathoracic disease. Medications During Visit:  Medications   sodium chloride 0.9 % bolus infusion 1,000 mL (has no administration in time range)   insulin regular (NOVOLIN R, HUMULIN R) injection 8 Units (has no administration in time range)         IMPRESSION:  1. Hepatic encephalopathy    2. Urinary incontinence, unspecified type    3.  Hyperglycemia DISPOSITION:  Admitted        Efe Fuentes DO

## 2023-01-17 NOTE — ED NOTES
Bedside and Verbal shift change report given to Sue German RN (oncoming nurse) by Kosta Ch RN (offgoing nurse). Report included the following information SBAR, ED Summary, MAR and Recent Results.

## 2023-01-17 NOTE — ED TRIAGE NOTES
He arrives with his family. He is visibly incontinent of urine. He says he has had some incontinece for about a month. He is here tonight for \"jerking\". He says its been going on for about two days. He is having some difficulty with recall. He took some time trying to tell me what was going on that brought him here. He says he is a diabetic and quit taking his metformin because it was not working. I asked him to sit in the hallway while I tried to find some dry pants. When I returned he had taken his pants off in the middle of the wallace.

## 2023-01-17 NOTE — PROGRESS NOTES
Admission Medication Reconciliation:    Information obtained from:  Patient  RxQuery data available¹:  YES    Comments/Recommendations:    1)  Spoke with patient and confirmed medications and allergies. 2)  Medication changes (since last review): Added  - Uloric  - Glucocil (OTC supplement)    Adjusted  - none    Removed  - ASA  - allopurinol    3) Asked patient about metformin as it was mentioned in a previous note. Patient reports to me that he does not take this medication anymore. ¹RxQuery pharmacy benefit data reflects medications filled and processed through the patient's insurance, however   this data does NOT capture whether the medication was picked up or is currently being taken by the patient. Allergies:  Patient has no known allergies. Significant PMH/Disease States:   Past Medical History:   Diagnosis Date    Chronic tophaceous gout     Diabetes ([facility] [address]     Hypertension      Chief Complaint for this Admission:    Chief Complaint   Patient presents with    Shaking    Incontinence     Prior to Admission Medications:   Prior to Admission Medications   Prescriptions Last Dose Informant Taking? OTHER,NON-FORMULARY,   Yes   Sig: Take 1 Capsule by mouth two (2) times a day. Name: Taylor Grant (OTC)   febuxostat (Uloric) 80 mg tab tablet   Yes   Sig: Take 80 mg by mouth daily. lisinopril (PRINIVIL, ZESTRIL) 40 mg tablet   Yes   Sig: Take 40 mg by mouth daily. Facility-Administered Medications: None       Please contact the main inpatient pharmacy with any questions or concerns at [phone] and we will direct you to the clinical pharmacist covering this patient's care while in-house.    Laquita Rowley, BHARATHID

## 2023-01-17 NOTE — PROCEDURES
[facility]  EEG    Name:  Leticia Cross  MR#:  [id number]  :  1955  ACCOUNT #:  [de-identified]  DATE OF SERVICE:  2023      REQUESTING PHYSICIAN:  Nieves Jones MD    HISTORY:  The patient is a 31-year-old male who is being evaluated for muscle twitching/abnormal spontaneous movements. DESCRIPTION:  This is an 18-channel EEG performed on an awake and drowsy patient. During wakefulness, the dominant posterior background rhythm consists of fairly symmetric well-modulated medium voltage rhythms in the 8-9 Hz frequency range of posterior head region. Faster frequencies are seen in the frontal areas. Drowsiness is characterized by slowing in the central region. Photic stimulation elicits a symmetric driving response. Hyperventilation was not performed. EEG SUMMARY:  Normal study. CLINICAL INTERPRETATION:  This was a normal EEG during awake and drowsy states of the patient. No lateralizing or epileptiform features were noted.       Haylie Low MD      [id number]  D:  2023 11:04  T:  2023 12:24  JOB #:  [id number]

## 2023-01-18 ENCOUNTER — APPOINTMENT (OUTPATIENT)
Dept: MRI IMAGING | Age: 68
DRG: 637 | End: 2023-01-18
Attending: INTERNAL MEDICINE
Payer: MEDICARE

## 2023-01-18 LAB
ACC. NO. FROM MICRO ORDER, ACCP: ABNORMAL
ACINETOBACTER CALCOACETICUS-BAUMANII COMPLEX, ACBCX: NOT DETECTED
ADMINISTERED INITIALS, ADMINIT: NORMAL
ANION GAP SERPL CALC-SCNC: 7 MMOL/L (ref 5–15)
ANION GAP SERPL CALC-SCNC: 7 MMOL/L (ref 5–15)
ANION GAP SERPL CALC-SCNC: 8 MMOL/L (ref 5–15)
ANION GAP SERPL CALC-SCNC: 9 MMOL/L (ref 5–15)
BACTEROIDES FRAGILIS, BFRA: NOT DETECTED
BASOPHILS # BLD: 0 K/UL (ref 0–0.1)
BASOPHILS NFR BLD: 1 % (ref 0–1)
BIOFIRE COMMENT, BCIDPF: ABNORMAL
BUN SERPL-MCNC: 14 MG/DL (ref 6–20)
BUN SERPL-MCNC: 15 MG/DL (ref 6–20)
BUN SERPL-MCNC: 16 MG/DL (ref 6–20)
BUN SERPL-MCNC: 19 MG/DL (ref 6–20)
BUN/CREAT SERPL: 13 (ref 12–20)
BUN/CREAT SERPL: 13 (ref 12–20)
BUN/CREAT SERPL: 14 (ref 12–20)
BUN/CREAT SERPL: 16 (ref 12–20)
C GLABRATA DNA VAG QL NAA+PROBE: NOT DETECTED
CALCIUM SERPL-MCNC: 8.1 MG/DL (ref 8.5–10.1)
CALCIUM SERPL-MCNC: 8.2 MG/DL (ref 8.5–10.1)
CALCIUM SERPL-MCNC: 8.3 MG/DL (ref 8.5–10.1)
CALCIUM SERPL-MCNC: 8.6 MG/DL (ref 8.5–10.1)
CANDIDA ALBICANS: NOT DETECTED
CANDIDA AURIS, CAAU: NOT DETECTED
CANDIDA KRUSEI, CKRP: NOT DETECTED
CANDIDA PARAPSILOSIS, CPAUP: NOT DETECTED
CANDIDA TROPICALIS, CTROP: NOT DETECTED
CHLORIDE SERPL-SCNC: 110 MMOL/L (ref 97–108)
CHLORIDE SERPL-SCNC: 110 MMOL/L (ref 97–108)
CHLORIDE SERPL-SCNC: 112 MMOL/L (ref 97–108)
CHLORIDE SERPL-SCNC: 112 MMOL/L (ref 97–108)
CHOLEST SERPL-MCNC: 286 MG/DL
CO2 SERPL-SCNC: 19 MMOL/L (ref 21–32)
CO2 SERPL-SCNC: 20 MMOL/L (ref 21–32)
CO2 SERPL-SCNC: 22 MMOL/L (ref 21–32)
CO2 SERPL-SCNC: 23 MMOL/L (ref 21–32)
COMMENT, HOLDF: NORMAL
CREAT SERPL-MCNC: 0.98 MG/DL (ref 0.7–1.3)
CREAT SERPL-MCNC: 1.16 MG/DL (ref 0.7–1.3)
CREAT SERPL-MCNC: 1.17 MG/DL (ref 0.7–1.3)
CREAT SERPL-MCNC: 1.23 MG/DL (ref 0.7–1.3)
CRYPTO NEOFORMANS/GATTII, CRYNEG: NOT DETECTED
CTX-M (ESBL RESISTANT GENE), CTX: NOT DETECTED
D50 ADMINISTERED, D50ADM: 0 ML
D50 ORDER, D50ORD: 0 ML
DIFFERENTIAL METHOD BLD: ABNORMAL
ENTEROBACTER CLOACAE COMPLEX, ECCP: NOT DETECTED
ENTEROBACTERALES SP. , ENBLS: DETECTED
ENTEROCOCCUS FAECALIS, ENFA: NOT DETECTED
ENTEROCOCCUS FAECIUM, ENFAM: NOT DETECTED
EOSINOPHIL # BLD: 0.2 K/UL (ref 0–0.4)
EOSINOPHIL NFR BLD: 3 % (ref 0–7)
ERYTHROCYTE [DISTWIDTH] IN BLOOD BY AUTOMATED COUNT: 14.9 % (ref 11.5–14.5)
ESCHERICHIA COLI: NOT DETECTED
GLUCOSE BLD STRIP.AUTO-MCNC: 115 MG/DL (ref 65–117)
GLUCOSE BLD STRIP.AUTO-MCNC: 122 MG/DL (ref 65–117)
GLUCOSE BLD STRIP.AUTO-MCNC: 143 MG/DL (ref 65–117)
GLUCOSE BLD STRIP.AUTO-MCNC: 145 MG/DL (ref 65–117)
GLUCOSE BLD STRIP.AUTO-MCNC: 146 MG/DL (ref 65–117)
GLUCOSE BLD STRIP.AUTO-MCNC: 152 MG/DL (ref 65–117)
GLUCOSE BLD STRIP.AUTO-MCNC: 154 MG/DL (ref 65–117)
GLUCOSE BLD STRIP.AUTO-MCNC: 167 MG/DL (ref 65–117)
GLUCOSE BLD STRIP.AUTO-MCNC: 176 MG/DL (ref 65–117)
GLUCOSE BLD STRIP.AUTO-MCNC: 177 MG/DL (ref 65–117)
GLUCOSE BLD STRIP.AUTO-MCNC: 183 MG/DL (ref 65–117)
GLUCOSE BLD STRIP.AUTO-MCNC: 184 MG/DL (ref 65–117)
GLUCOSE BLD STRIP.AUTO-MCNC: 188 MG/DL (ref 65–117)
GLUCOSE BLD STRIP.AUTO-MCNC: 191 MG/DL (ref 65–117)
GLUCOSE BLD STRIP.AUTO-MCNC: 195 MG/DL (ref 65–117)
GLUCOSE BLD STRIP.AUTO-MCNC: 196 MG/DL (ref 65–117)
GLUCOSE BLD STRIP.AUTO-MCNC: 203 MG/DL (ref 65–117)
GLUCOSE BLD STRIP.AUTO-MCNC: 222 MG/DL (ref 65–117)
GLUCOSE BLD STRIP.AUTO-MCNC: 227 MG/DL (ref 65–117)
GLUCOSE BLD STRIP.AUTO-MCNC: 547 MG/DL (ref 65–117)
GLUCOSE SERPL-MCNC: 121 MG/DL (ref 65–100)
GLUCOSE SERPL-MCNC: 201 MG/DL (ref 65–100)
GLUCOSE SERPL-MCNC: 214 MG/DL (ref 65–100)
GLUCOSE SERPL-MCNC: 219 MG/DL (ref 65–100)
GLUCOSE, GLC: 115 MG/DL
GLUCOSE, GLC: 122 MG/DL
GLUCOSE, GLC: 143 MG/DL
GLUCOSE, GLC: 145 MG/DL
GLUCOSE, GLC: 146 MG/DL
GLUCOSE, GLC: 152 MG/DL
GLUCOSE, GLC: 154 MG/DL
GLUCOSE, GLC: 167 MG/DL
GLUCOSE, GLC: 176 MG/DL
GLUCOSE, GLC: 177 MG/DL
GLUCOSE, GLC: 183 MG/DL
GLUCOSE, GLC: 184 MG/DL
GLUCOSE, GLC: 188 MG/DL
GLUCOSE, GLC: 191 MG/DL
GLUCOSE, GLC: 195 MG/DL
GLUCOSE, GLC: 196 MG/DL
GLUCOSE, GLC: 203 MG/DL
GLUCOSE, GLC: 222 MG/DL
GLUCOSE, GLC: 227 MG/DL
HAEMOPHILUS INFLUENZAE, HMI: NOT DETECTED
HCT VFR BLD AUTO: 40.9 % (ref 36.6–50.3)
HDLC SERPL-MCNC: 29 MG/DL
HDLC SERPL: 9.9 (ref 0–5)
HGB BLD-MCNC: 12.6 G/DL (ref 12.1–17)
HIGH TARGET, HITG: 180 MG/DL
IMM GRANULOCYTES # BLD AUTO: 0.1 K/UL (ref 0–0.04)
IMM GRANULOCYTES NFR BLD AUTO: 1 % (ref 0–0.5)
IMP (CARBAPENEMASE RESISTANT GENE), IMPC: NOT DETECTED
INSULIN ADMINSTERED, INSADM: 1 UNITS/HOUR
INSULIN ADMINSTERED, INSADM: 1.5 UNITS/HOUR
INSULIN ADMINSTERED, INSADM: 1.7 UNITS/HOUR
INSULIN ADMINSTERED, INSADM: 2.4 UNITS/HOUR
INSULIN ADMINSTERED, INSADM: 2.4 UNITS/HOUR
INSULIN ADMINSTERED, INSADM: 3.4 UNITS/HOUR
INSULIN ADMINSTERED, INSADM: 3.5 UNITS/HOUR
INSULIN ADMINSTERED, INSADM: 3.6 UNITS/HOUR
INSULIN ADMINSTERED, INSADM: 4.1 UNITS/HOUR
INSULIN ADMINSTERED, INSADM: 4.1 UNITS/HOUR
INSULIN ADMINSTERED, INSADM: 4.4 UNITS/HOUR
INSULIN ADMINSTERED, INSADM: 5.1 UNITS/HOUR
INSULIN ADMINSTERED, INSADM: 5.4 UNITS/HOUR
INSULIN ADMINSTERED, INSADM: 5.8 UNITS/HOUR
INSULIN ADMINSTERED, INSADM: 6 UNITS/HOUR
INSULIN ADMINSTERED, INSADM: 6.1 UNITS/HOUR
INSULIN ADMINSTERED, INSADM: 6.7 UNITS/HOUR
INSULIN ADMINSTERED, INSADM: 7.8 UNITS/HOUR
INSULIN ADMINSTERED, INSADM: 7.9 UNITS/HOUR
INSULIN ADMINSTERED, INSADM: 8.9 UNITS/HOUR
INSULIN ADMINSTERED, INSADM: 9.7 UNITS/HOUR
INSULIN ORDER, INSORD: 1 UNITS/HOUR
INSULIN ORDER, INSORD: 1.5 UNITS/HOUR
INSULIN ORDER, INSORD: 1.7 UNITS/HOUR
INSULIN ORDER, INSORD: 2.4 UNITS/HOUR
INSULIN ORDER, INSORD: 2.4 UNITS/HOUR
INSULIN ORDER, INSORD: 3.4 UNITS/HOUR
INSULIN ORDER, INSORD: 3.5 UNITS/HOUR
INSULIN ORDER, INSORD: 3.6 UNITS/HOUR
INSULIN ORDER, INSORD: 4.1 UNITS/HOUR
INSULIN ORDER, INSORD: 4.1 UNITS/HOUR
INSULIN ORDER, INSORD: 4.4 UNITS/HOUR
INSULIN ORDER, INSORD: 5.1 UNITS/HOUR
INSULIN ORDER, INSORD: 5.4 UNITS/HOUR
INSULIN ORDER, INSORD: 5.8 UNITS/HOUR
INSULIN ORDER, INSORD: 6 UNITS/HOUR
INSULIN ORDER, INSORD: 6.1 UNITS/HOUR
INSULIN ORDER, INSORD: 6.7 UNITS/HOUR
INSULIN ORDER, INSORD: 7.8 UNITS/HOUR
INSULIN ORDER, INSORD: 7.9 UNITS/HOUR
INSULIN ORDER, INSORD: 8.9 UNITS/HOUR
INSULIN ORDER, INSORD: 9.7 UNITS/HOUR
KLEBSIELLA AEROGENES, KLAE: NOT DETECTED
KLEBSIELLA OXYTOCA: DETECTED
KLEBSIELLA PNEUMONIAE GROUP, KPPG: NOT DETECTED
KPC (CARBAPENEM RESISTANCE GENE): NOT DETECTED
LDLC SERPL CALC-MCNC: ABNORMAL MG/DL (ref 0–100)
LDLC SERPL DIRECT ASSAY-MCNC: 64 MG/DL (ref 0–100)
LISTERIA MONOCYTOGENES, LMONP: NOT DETECTED
LOW TARGET, LOT: 140 MG/DL
LYMPHOCYTES # BLD: 1.3 K/UL (ref 0.8–3.5)
LYMPHOCYTES NFR BLD: 17 % (ref 12–49)
MAGNESIUM SERPL-MCNC: 1.7 MG/DL (ref 1.6–2.4)
MAGNESIUM SERPL-MCNC: 1.9 MG/DL (ref 1.6–2.4)
MAGNESIUM SERPL-MCNC: 2 MG/DL (ref 1.6–2.4)
MAGNESIUM SERPL-MCNC: 2 MG/DL (ref 1.6–2.4)
MCH RBC QN AUTO: 27.6 PG (ref 26–34)
MCHC RBC AUTO-ENTMCNC: 30.8 G/DL (ref 30–36.5)
MCR-1 (COLISTIN RESISTANT GENE), MCR: NOT DETECTED
MCV RBC AUTO: 89.7 FL (ref 80–99)
MINUTES UNTIL NEXT BG, NBG: 60 MIN
MONOCYTES # BLD: 0.8 K/UL (ref 0–1)
MONOCYTES NFR BLD: 10 % (ref 5–13)
MULTIPLIER, MUL: 0.02
MULTIPLIER, MUL: 0.02
MULTIPLIER, MUL: 0.03
MULTIPLIER, MUL: 0.04
MULTIPLIER, MUL: 0.05
MULTIPLIER, MUL: 0.06
MULTIPLIER, MUL: 0.07
MULTIPLIER, MUL: 0.07
NDM (CARBAPENEMASE RESISTANT GENE), NDM: NOT DETECTED
NEISSERIA MENINGITIDIS, NMNI: NOT DETECTED
NEUTS SEG # BLD: 5.5 K/UL (ref 1.8–8)
NEUTS SEG NFR BLD: 68 % (ref 32–75)
NRBC # BLD: 0 K/UL (ref 0–0.01)
NRBC BLD-RTO: 0 PER 100 WBC
ORDER INITIALS, ORDINIT: NORMAL
OXA-48-LIKE (CARBAPENEMASE RESISTANT GENE), OXA48: NOT DETECTED
PHOSPHATE SERPL-MCNC: 3.1 MG/DL (ref 2.6–4.7)
PLATELET # BLD AUTO: 205 K/UL (ref 150–400)
PMV BLD AUTO: 10.6 FL (ref 8.9–12.9)
POTASSIUM SERPL-SCNC: 3.4 MMOL/L (ref 3.5–5.1)
POTASSIUM SERPL-SCNC: 3.4 MMOL/L (ref 3.5–5.1)
POTASSIUM SERPL-SCNC: 3.5 MMOL/L (ref 3.5–5.1)
POTASSIUM SERPL-SCNC: 3.8 MMOL/L (ref 3.5–5.1)
PROTEUS, PRP: NOT DETECTED
PSEUDOMONAS AERUGINOSA: NOT DETECTED
RBC # BLD AUTO: 4.56 M/UL (ref 4.1–5.7)
RESISTANT GENE SPACE, REGENE: ABNORMAL
SALMONELLA, SALMO: NOT DETECTED
SAMPLES BEING HELD,HOLD: NORMAL
SERRATIA MARCESCENS: NOT DETECTED
SERVICE CMNT-IMP: ABNORMAL
SERVICE CMNT-IMP: NORMAL
SODIUM SERPL-SCNC: 138 MMOL/L (ref 136–145)
SODIUM SERPL-SCNC: 140 MMOL/L (ref 136–145)
SODIUM SERPL-SCNC: 140 MMOL/L (ref 136–145)
SODIUM SERPL-SCNC: 141 MMOL/L (ref 136–145)
STAPH EPIDERMIDIS, STEP: NOT DETECTED
STAPH LUGDUNENSIS, STALUG: NOT DETECTED
STAPHYLOCOCCUS AUREUS: NOT DETECTED
STAPHYLOCOCCUS, STAPP: NOT DETECTED
STENO MALTOPHILIA, STMA: NOT DETECTED
STREPTOCOCCUS , STPSP: NOT DETECTED
STREPTOCOCCUS AGALACTIAE (GROUP B): NOT DETECTED
STREPTOCOCCUS PNEUMONIAE , SPNP: NOT DETECTED
STREPTOCOCCUS PYOGENES (GROUP A), SPYOP: NOT DETECTED
TRIGL SERPL-MCNC: 1276 MG/DL (ref ?–150)
VIM (CARBAPENEMASE RESISTANT GENE), VIM: NOT DETECTED
VLDLC SERPL CALC-MCNC: ABNORMAL MG/DL
WBC # BLD AUTO: 7.9 K/UL (ref 4.1–11.1)

## 2023-01-18 PROCEDURE — 74011000250 HC RX REV CODE- 250

## 2023-01-18 PROCEDURE — 80048 BASIC METABOLIC PNL TOTAL CA: CPT

## 2023-01-18 PROCEDURE — 74011000258 HC RX REV CODE- 258: Performed by: CLINICAL NURSE SPECIALIST

## 2023-01-18 PROCEDURE — 74011250636 HC RX REV CODE- 250/636: Performed by: INTERNAL MEDICINE

## 2023-01-18 PROCEDURE — 83735 ASSAY OF MAGNESIUM: CPT

## 2023-01-18 PROCEDURE — 99231 SBSQ HOSP IP/OBS SF/LOW 25: CPT | Performed by: CLINICAL NURSE SPECIALIST

## 2023-01-18 PROCEDURE — 83721 ASSAY OF BLOOD LIPOPROTEIN: CPT

## 2023-01-18 PROCEDURE — 82962 GLUCOSE BLOOD TEST: CPT

## 2023-01-18 PROCEDURE — 74011636637 HC RX REV CODE- 636/637: Performed by: CLINICAL NURSE SPECIALIST

## 2023-01-18 PROCEDURE — 74011250637 HC RX REV CODE- 250/637: Performed by: INTERNAL MEDICINE

## 2023-01-18 PROCEDURE — 80061 LIPID PANEL: CPT

## 2023-01-18 PROCEDURE — 84100 ASSAY OF PHOSPHORUS: CPT

## 2023-01-18 PROCEDURE — 85025 COMPLETE CBC W/AUTO DIFF WBC: CPT

## 2023-01-18 PROCEDURE — 74011000250 HC RX REV CODE- 250: Performed by: INTERNAL MEDICINE

## 2023-01-18 PROCEDURE — 65660000001 HC RM ICU INTERMED STEPDOWN

## 2023-01-18 PROCEDURE — 36415 COLL VENOUS BLD VENIPUNCTURE: CPT

## 2023-01-18 PROCEDURE — 93005 ELECTROCARDIOGRAM TRACING: CPT

## 2023-01-18 RX ORDER — FENOFIBRATE 160 MG/1
160 TABLET ORAL
Status: DISCONTINUED | OUTPATIENT
Start: 2023-01-18 | End: 2023-01-22 | Stop reason: HOSPADM

## 2023-01-18 RX ORDER — ACETAMINOPHEN 325 MG/1
650 TABLET ORAL
Status: DISCONTINUED | OUTPATIENT
Start: 2023-01-18 | End: 2023-01-22 | Stop reason: HOSPADM

## 2023-01-18 RX ORDER — LORAZEPAM 1 MG/1
2 TABLET ORAL ONCE
Status: COMPLETED | OUTPATIENT
Start: 2023-01-18 | End: 2023-01-18

## 2023-01-18 RX ORDER — METOPROLOL TARTRATE 5 MG/5ML
INJECTION INTRAVENOUS
Status: COMPLETED
Start: 2023-01-18 | End: 2023-01-18

## 2023-01-18 RX ADMIN — LACTULOSE 30 ML: 20 SOLUTION ORAL at 09:47

## 2023-01-18 RX ADMIN — SODIUM CHLORIDE, PRESERVATIVE FREE 10 ML: 5 INJECTION INTRAVENOUS at 16:05

## 2023-01-18 RX ADMIN — SODIUM CHLORIDE, PRESERVATIVE FREE 10 ML: 5 INJECTION INTRAVENOUS at 05:59

## 2023-01-18 RX ADMIN — ENOXAPARIN SODIUM 40 MG: 100 INJECTION SUBCUTANEOUS at 09:47

## 2023-01-18 RX ADMIN — Medication 2 UNITS: at 13:51

## 2023-01-18 RX ADMIN — CEFTRIAXONE 2 G: 2 INJECTION, POWDER, FOR SOLUTION INTRAMUSCULAR; INTRAVENOUS at 09:47

## 2023-01-18 RX ADMIN — FENOFIBRATE 160 MG: 160 TABLET ORAL at 21:01

## 2023-01-18 RX ADMIN — LORAZEPAM 2 MG: 1 TABLET ORAL at 16:06

## 2023-01-18 RX ADMIN — DEXTROSE AND SODIUM CHLORIDE 150 ML/HR: 5; 900 INJECTION, SOLUTION INTRAVENOUS at 04:55

## 2023-01-18 RX ADMIN — LACTULOSE 30 ML: 20 SOLUTION ORAL at 21:00

## 2023-01-18 RX ADMIN — LACTULOSE 30 ML: 20 SOLUTION ORAL at 16:06

## 2023-01-18 RX ADMIN — ASPIRIN 81 MG: 81 TABLET, COATED ORAL at 09:47

## 2023-01-18 RX ADMIN — ALLOPURINOL 100 MG: 100 TABLET ORAL at 09:47

## 2023-01-18 RX ADMIN — SODIUM CHLORIDE, PRESERVATIVE FREE 10 ML: 5 INJECTION INTRAVENOUS at 20:59

## 2023-01-18 RX ADMIN — DEXTROSE AND SODIUM CHLORIDE 150 ML/HR: 5; 900 INJECTION, SOLUTION INTRAVENOUS at 11:19

## 2023-01-18 RX ADMIN — ACETAMINOPHEN 650 MG: 325 TABLET ORAL at 17:44

## 2023-01-18 RX ADMIN — METOPROLOL TARTRATE 5 MG: 5 INJECTION, SOLUTION INTRAVENOUS at 16:00

## 2023-01-18 RX ADMIN — SODIUM CHLORIDE 6.1 UNITS/HR: 9 INJECTION, SOLUTION INTRAVENOUS at 14:59

## 2023-01-18 RX ADMIN — LISINOPRIL 40 MG: 20 TABLET ORAL at 09:47

## 2023-01-18 NOTE — DIABETES MGMT
[facility]  DIABETES MANAGEMENT CONSULT    Consulted by Provider for advanced nursing evaluation and care for inpatient blood glucose management. Evaluation and Action Plan   Memo Nunez is a 80 yo male admitted 1/16/23 with muscle twitching (arms) and urinary incontinence. HHS noted on lab work -BG >600/ triglycerides >2000/ negative ketones. Ammonia level >100. Insulin infusion initiated at admission along with IVF. Re: recent diabetes mgmt, he reports BG >300 consistently and he took himself off Metformin as \"it was not working\" No PCP follow up per his report. He has had bilateral toe amputations in the past , so already experiencing long term complications from uncontrolled diabetes- Noted in hospitalist note he verbalized no prior history of diabetes, so inconsistent information provided by patient. Since triglycerides remain elevated and >2000, would recommend continuing insulin infusion until triglycerides <1000. Will add dextrose to IVF to maintain steady BG levels while triglycerides normalize. Once normalized, will transition patient off insulin infusion to subcutaneous insulin injection per recs below. Noted CMP ordered for AM 1/18/23 which will have triglyceride level in it. Conferred with hospitalist, Dr. Irvin Canas, re: recs. Triglyceride levels improved today, 1275 from 2000 at admission. Would recommend keeping on insulin infusion until triglyceride level <1000 and then starting PO fibrate at discharge. BG target profile on glucostabilizer 140-180. Management Rationale Action Plan   Medication    When ready to convert  Initiate the subcutaneous insulin order set with:  Basal insulin: 0.3 units/kg/day=25 units Lantus dialy.   First dose now then turn off insulin gtt two hours later  Correctional insulin ACHS at normal sensitivity, start at a glucose of 200  Consistent Carbohydrate diet  Adjust to non-dextrose containing IVF once triglycerides <1000  If patient is experiencing pre-prandial hyperglycemia over 200 on basal and correctional insulin, add Low dose bolus insulin. 0.05 units/kg/meal=4 units Humalog TID  . Hold if patient consumes less than 50% of carbohydrates on meal tray    Additional orders  Case mgmt referral for PCP follow up care        Discharge planning   Medication  Will require T2D medication to manage uncontrolled diabetes. He would not like to be on insulin unless it was \"last resort\"-Recommendations TBD- consider GLP-1 weekly, OR Januvia daily, with Glipizide - would still benefit greatly from insulin therapy. Referral  [x]        Outpatient diabetes education   Additional orders            Initial Presentation   Moses Davies is a 79 y.o. male admitted 1/16/23 after experiencing  muscle twitching and urinary incontinence. Per patient over the last 1 to 2 months he has been having worsening urinary incontinence. States for the last 3 to 4 days he has been having muscle twitching and spontaneous a movements; he remains awake during the episode. LAB: BG >600/ anion gap 6/ negative ketones /ammonia 110/ triglyceride >2000/   CXR: No acute intrathoracic disease. CT head: no acute intracranial process      HX:   Past Medical History:   Diagnosis Date    Chronic tophaceous gout     Diabetes ([facility] [address]     Hypertension         INITIAL DX:   Acute hepatic encephalopathy [K76.82]     Current Treatment     TX: IVF/ insulin infusion/neuro checks    Hospital Course   Clinical progress has been complicated by acute hepatic encephalopathy in absence of liver disease? /found to be in [place] [facility] on admission and hypertriglyceridemia (>2000) . 1/18/23: Triglycerides improved/ MRI brain today/ ammonia level normalized  Diabetes History   T2D, current A1C >14%- ED MD note states he was taken off Metformin at some point. No consistent diabetes follow up noted.      Diabetes-related Medical History  Acute complications  HHNK  Neurological complications  Peripheral neuropathy  Microvascular disease  none  Macrovascular disease  Foot wounds  Other associated conditions     Gout/ HTN    Diabetes Medication History: Previously prescribed Metformin-took himself off 'months ago'  Key Antihyperglycemic Medications       Patient is on no antihyperglycemic meds. Diabetes self-management practices:   Eating pattern   [x] Eating a carbohydrate-controlled meal plan  [x] Breakfast  eggs  [x] Lunch   PB&J sandwich  [x] Dinner   Some soret of crock pot meal/ bean soup   [x] Beverages  Water/ 1/2 Dr. Nancy Akbar daily   Physical activity pattern-not discussed     Monitoring pattern- Glucometer- BG have been 300s per his report     Taking medications pattern  [x] Consistent administration  [x] Affordable  Social determinants of health impacting diabetes self-management practices   Concerned that you need to know more about how to stay healthy with diabetes  Overall evaluation:    [x] A1c ABOVE target without drug therapy & lacking self-care practices    Subjective   \" The food here sucks\" Patient at first appears frustrated Mitali Massing - Explained reasoning behind continuance of insulin infusion to reduced triglyceride level- he stated my triglycerides \"has always been high\" when asked how high he responded \"greater than 600\". Explained that continued elevated triglycerides can lead to pancreatitis-   Discussed concern over elevated A1C and the long term complications associated with chronic uncontrolled diabetes.  Discussed needing insulin due to elevated A1C and he is resistant to it unless \"it's last resort\"- He would like to try \"other medications first\"      Retired [place] after serving [address]       Objective   Physical exam  General Over weight male in no acute distress/. Conversant and cooperative  Neuro  Alert, oriented   Vital Signs Visit Vitals  /71 (BP 1 Location: Right upper arm, BP Patient Position: At rest)   Pulse 87   Temp 98.1 °F (36.7 °C)   Resp 18   Ht 5' 11\" (1.803 m)   Wt 92.4 kg (203 lb 11.3 oz)   SpO2 96%   BMI 28.41 kg/m²     Skin  Warm and dry. Heart   Regular rate and rhythm. No murmurs, rubs or gallops  Lungs  Clear to auscultation without rales or rhonchi  Extremities No foot wounds    Diabetic foot exam:    Left Foot-5th toe amputation PTA years ago     Visual Exam: normal    Pulse DP: 2+ (normal)   Filament test: reduced sensation      Right Foot-5th toe amputation PTA years ago   Visual Exam: normal    Pulse DP: 2+ (normal)   Filament test: reduced sensation     DP & PT pulses +2. Laboratory  Recent Labs     01/18/23  1019 01/18/23  0132 01/17/23  1832 01/17/23  0248 01/16/23 2007   * 121* 145*   < > 607*   AGAP 7 8 6   < > 7   TRIGL  --  1,276*  --   --  2,029*   WBC  --  7.9  --   --  7.8   CREA 1.16 1.17 1.15   < > 1.70*   AST  --   --   --   --  24   ALT  --   --   --   --  33    < > = values in this interval not displayed.          Factors impacting BG management  Factor Dose Comments   Nutrition:  Standard meals     45 grams/meal      Other:   Kidney function  Liver function     eGFR >60, cr+ 1.35  AST/ALT WNL  Ammonia >100      Blood glucose pattern    Significant diabetes-related events over the past 24-72 hours  T2D, HHS- current  A1C >14%  Insulin infusion via glucostabilizer  BG target 140-180  Consuming PO diet >50%    Assessment and Nursing Intervention   Nursing Diagnosis Risk for unstable blood glucose pattern   Nursing Intervention Domain 5250 Decision-making Support   Nursing Interventions Examined current inpatient diabetes/blood glucose control   Explored factors facilitating and impeding inpatient management  Explored corrective strategies with patient and responsible inpatient provider   Informed patient of rational for insulin strategy while hospitalized     Nursing Diagnosis 34172 Ineffective Health Management   Nursing Intervention Domain 5250 Decision-making Support   Nursing Interventions Identified diabetes self-management practices impeding diabetes control  Discussed diabetes survival skills related to  Healthy Plate eating plan; given handouts  Role of physical activity in improving insulin sensitivity and action  Procedure for blood glucose monitoring & options for low-cost products  Medications plan at discharge     Billing Code(s)   24013     Before making these care recommendations, I personally reviewed the hospitalization record, including notes, laboratory & diagnostic data and current medications, and examined the patient at the bedside (circumstances permitting) before determining care. More than fifty (50) percent of the time was spent in patient counseling and/or care coordination.   Total minutes: 509 NCourtney Bazan, CNS  Diabetes Clinical Nurse Specialist  [facility]  Access via [facility]

## 2023-01-18 NOTE — PROGRESS NOTES
[facility]  Hospitalist Group                                                                                          Hospitalist Progress Note  Ariella Degroot MD  Answering service: [phone] from in house phone        Date of Service:  2023  NAME:  Alma Cabrera  :  1955  MRN:  [id number]      Admission Summary: This is a 72-year-old man with past medical history significant for type 2 diabetes, chronic kidney disease, and hypertension; presented at the emergency room with muscle twitching and urinary incontinence. The patient's symptoms have been going on for a couple of months and progressively getting worse. In the last couple of days, the patient has been having frequent spontaneous muscle twitching as well as urinary incontinence. The patient is alert with each episode. The patient has no prior history of seizure disorder and denies any history of DM. He does not have a PCP. Upon arrival, patient was lethargic, had an elevated Ammonia level, and a blood glucose of 552. He was started on an Insulin drip on admission. He was admitted for further work-up and treatment. Interval history / Subjective:   Patient seen and examined during RRT, which was called for an elevated BP and HR. Patient with significant tremors, no fevers per RN, shaking, HR elevated on the monitor and BP elevated. Measurements seem inaccurate as patient has tremors. Another RRT was called later this afternoon, this time patient had a fever and chills. Tylenol given. Patient with sepsis 2/2 UTI and Bacteremia. Wife updated at the bedside. Assessment & Plan:        Acute metabolic encephalopathy:   - dehydration vs hepatic encephalopathy;   - no history of liver disease;   - started lactulose;   - repeat ammonia level: 110 on admission --> 54;   - B12 within normal range;   - folic acid level within normal range;    - drug screen negative;      Sepsis, present on admission:  - source of infection: UTI with bacteremia;   - urine cx: Gram negative rods;   - blood cx: probable Klebsiella oxytoca growing in 2/4 bottles;  - awaiting susceptibilities, continue Rocephin for now;     Type 2 diabetes with hyperosmolar nonketotic hyperglycemia:   - new diagnosis of DM;   - Appreciate DM team consult and recommendations;  - continue Insulin drip today;   - transition to Lantus and Lispro SSI;   - HgA1c >14;    Hyponatremia:    - hypovolemic;  - IVFs and monitor;     Elevated Creatinine:  - OKSANA vs CKD;   - IVFs and trend; Hypertension:    - patient has taken Lisinopril in the past, but does not have a PCP and has no refills, has not taken any medications \"in a while\"; Tremors:  - concerning for seizure activity;  - Neuro consulted;   - EEG negative;    Urinary incontinence:    - most likely due to UTI;   - CT scan of the abdomen and pelvis to check for obstruction; Code status: Full   Prophylaxis: B SCD's  Care Plan discussed with: patient  Anticipated Disposition: home in 2-3 days;       Critical Care Attestation:  RRT x2, attended in person; This patient is unstable and critically ill. Due to a high probability of clinically significant, life threatening deterioration, the patient required my highest level of preparedness to intervene emergently and I personally spent this critical care time directly and personally managing the patient. This critical care time included obtaining a history; examining the patient; pulse oximetry; ordering and review of studies; arranging urgent treatment with development of a management plan; evaluation of patient's response to treatment; frequent reassessment; and, discussions with other providers and/or family. This critical care time was performed to assess and manage the high probability of imminent, life-threatening deterioration that could result in multi-organ failure and death.  It was exclusive of separately billable procedures, treating other patients, and teaching time. Time Spent:     I personally spent 40 minutes in providing critical care time. Hospital Problems  Date Reviewed: 1/17/2023            Codes Class Noted POA    * (Principal) Type 2 diabetes mellitus with hyperosmolar nonketotic hyperglycemia (HCC) ICD-10-CM: E11.00  ICD-9-CM: 250.20  1/17/2023 Yes        Acute hepatic encephalopathy ICD-10-CM: K76.82  ICD-9-CM: 572.2  1/16/2023 Unknown           Review of Systems:   A comprehensive review of systems was negative except for that written in the HPI. Vital Signs:    Last 24hrs VS reviewed since prior progress note. Most recent are:  Visit Vitals  BP (!) 155/117   Pulse (!) 123   Temp (!) 101.5 °F (38.6 °C)   Resp 22   Ht 5' 11\" (1.803 m)   Wt 92.4 kg (203 lb 11.3 oz)   SpO2 100%   BMI 28.41 kg/m²         Intake/Output Summary (Last 24 hours) at 1/18/2023 1745  Last data filed at 1/18/2023 1606  Gross per 24 hour   Intake --   Output 350 ml   Net -350 ml          Physical Examination:     I had a face to face encounter with this patient and independently examined them on 1/18/2023 as outlined below:          Constitutional:  No acute distress, cooperative, pleasant    ENT:  Oral mucosa moist, oropharynx benign. Resp:  CTA bilaterally. No wheezing/rhonchi/rales. No accessory muscle use. CV:  Regular rhythm, normal rate, no murmurs, gallops, rubs    GI:  Soft, non distended, non tender. normoactive bowel sounds, no hepatosplenomegaly     Musculoskeletal:  No edema, warm, 2+ pulses throughout    Neurologic:  Moves all extremities.   AAOx3, CN II-XII reviewed            Data Review:    Review and/or order of clinical lab test  Review and/or order of tests in the radiology section of CPT  Review and/or order of tests in the medicine section of CPT      Labs:     Recent Labs     01/18/23  0132 01/16/23 2007   WBC 7.9 7.8   HGB 12.6 13.9   HCT 40.9 43.6    240       Recent Labs 01/18/23  1450 01/18/23  1019 01/18/23  0132 01/17/23  1832 01/17/23  1426 01/17/23  0248 01/16/23 2007    140 138   < > 136   < > 125*   K 3.4* 3.5 3.8   < > 3.7   < > 4.3   * 110* 110*   < > 103   < > 90*   CO2 22 23 20*   < > 26   < > 28   BUN 14 15 19   < > 22*   < > 25*   CREA 0.98 1.16 1.17   < > 1.25   < > 1.70*   * 219* 121*   < > 235*   < > 607*   CA 8.3* 8.6 8.1*   < > 9.1   < > 10.0   MG 2.0 1.9 2.0   < > 2.2  --  2.3   PHOS  --   --  3.1  --  2.7  --  3.2    < > = values in this interval not displayed. Recent Labs     01/16/23 2007   ALT 33   AP 87   TBILI 0.5   TP 8.6*   ALB 3.0*   GLOB 5.6*       No results for input(s): INR, PTP, APTT, INREXT, INREXT in the last 72 hours. No results for input(s): FE, TIBC, PSAT, FERR in the last 72 hours. Lab Results   Component Value Date/Time    Folate 9.0 01/16/2023 08:07 PM        No results for input(s): PH, PCO2, PO2 in the last 72 hours. No results for input(s): CPK, CKNDX, TROIQ in the last 72 hours.     No lab exists for component: CPKMB  Lab Results   Component Value Date/Time    Cholesterol, total 286 (H) 01/18/2023 01:32 AM    HDL Cholesterol 29 01/18/2023 01:32 AM    LDL,Direct 64 01/18/2023 01:32 AM    LDL, calculated Not calculated due to elevated triglyceride level 01/18/2023 01:32 AM    Triglyceride 1,276 (H) 01/18/2023 01:32 AM    CHOL/HDL Ratio 9.9 (H) 01/18/2023 01:32 AM     Lab Results   Component Value Date/Time    Glucose (POC) 122 (H) 01/18/2023 05:08 PM    Glucose (POC) 146 (H) 01/18/2023 03:37 PM    Glucose (POC) 188 (H) 01/18/2023 02:55 PM    Glucose (POC) 146 (H) 01/18/2023 01:39 PM    Glucose (POC) 191 (H) 01/18/2023 12:24 PM     Lab Results   Component Value Date/Time    Color YELLOW/STRAW 01/17/2023 07:15 AM    Appearance CLOUDY (A) 01/17/2023 07:15 AM    Specific gravity 1.010 01/17/2023 07:15 AM    pH (UA) 6.0 01/17/2023 07:15 AM    Protein TRACE (A) 01/17/2023 07:15 AM    Glucose >1,000 (A) 01/17/2023 07:15 AM    Ketone Negative 01/17/2023 07:15 AM    Bilirubin Negative 01/17/2023 07:15 AM    Urobilinogen 0.2 01/17/2023 07:15 AM    Nitrites Positive (A) 01/17/2023 07:15 AM    Leukocyte Esterase SMALL (A) 01/17/2023 07:15 AM    Epithelial cells FEW 01/17/2023 07:15 AM    Bacteria 1+ (A) 01/17/2023 07:15 AM    WBC 20-50 01/17/2023 07:15 AM    RBC 0-5 01/17/2023 07:15 AM         Medications Reviewed:     Current Facility-Administered Medications   Medication Dose Route Frequency    cefTRIAXone (ROCEPHIN) 2 g in 0.9% sodium chloride 20 mL IV syringe  2 g IntraVENous Q24H    fenofibrate (LOFIBRA) tablet 160 mg  160 mg Oral QHS    acetaminophen (TYLENOL) tablet 650 mg  650 mg Oral Q4H PRN    lisinopriL (PRINIVIL, ZESTRIL) tablet 40 mg  40 mg Oral DAILY    sodium chloride (NS) flush 5-40 mL  5-40 mL IntraVENous Q8H    sodium chloride (NS) flush 5-40 mL  5-40 mL IntraVENous PRN    polyethylene glycol (MIRALAX) packet 17 g  17 g Oral DAILY PRN    ondansetron (ZOFRAN ODT) tablet 4 mg  4 mg Oral Q8H PRN    Or    ondansetron (ZOFRAN) injection 4 mg  4 mg IntraVENous Q6H PRN    enoxaparin (LOVENOX) injection 40 mg  40 mg SubCUTAneous DAILY    lactulose (CHRONULAC) 10 gram/15 mL solution 30 mL  30 mL Oral TID    dextrose 5% and 0.9% NaCl infusion  150 mL/hr IntraVENous CONTINUOUS    insulin regular (NOVOLIN R, HUMULIN R) 100 Units in 0.9% sodium chloride 100 mL infusion  0-50 Units/hr IntraVENous TITRATE    insulin lispro (HUMALOG) injection   SubCUTAneous TIDAC    glucose chewable tablet 16 g  4 Tablet Oral PRN    glucagon (GLUCAGEN) injection 1 mg  1 mg IntraMUSCular PRN    dextrose 10 % infusion 0-250 mL  0-250 mL IntraVENous PRN     ______________________________________________________________________  EXPECTED LENGTH OF STAY: 4d 14h  ACTUAL LENGTH OF STAY:          2                 Tyler Brooks MD

## 2023-01-18 NOTE — PROGRESS NOTES
Clinical Pharmacy Note: Ceftriaxone Dosing    Please note that the ceftriaxone dose for Angeline Parker has been changed to 2000 mg IV q24h per [facility]-approved protocol. Please contact the pharmacy with any questions.     Timur Glover, PHARMD

## 2023-01-18 NOTE — PROGRESS NOTES
Rapid response called overhead at this time, RRT responding. When entering room patient visibly shaking, rhythm un-identifiable on monitor. Patient speaking in complete sentences with no difficulty or SOB noted. Dr. Joyce Mason in room and primary RNs attempting EKG at this time. RRT attempting to palpate pulse for accurate measurement. Primary RN gave IV metoprolol at this time. RRT able to palpate radial pulse of 105 BPM at this time. Patient denies pain. EKG obtained and heart rate at 103 per EKG at this time. Further orders received from Dr. Joyce Mason for IV ativan, see MAR for further administration instructions.     RRT will continue to follow

## 2023-01-18 NOTE — PROGRESS NOTES
1015 Q4 BMP lab sent    0271   MRI screening with E signature completed    Made aware MRI team    1415 Q4 BMP lab sent    [phone]   Patient was trimerous and very shaky   Patient denied any other symptoms  Checked V/S within normal range and BG (146)  Neuro assessment completed  AOx 4, clear speech, spontaneous eye opening,   Perfect served to MD    [address] and MD arrived   HR elevated to 150's  1600 Metoprolol 5 mg IV given  1601EKG completed  1606 Ativan 2 mg PO given  1610 Tremor and shakiness resolved   Will continue to monitor    1615   Inquired MRI scheduling   Made aware MRI team the patient is Up ad jae    1730  Patient had another episode of tremor, shakiness, and elevated HR with fever (101.5)  Made MD aware above   1744 PRN Tylenol given  1755   O2 sats decreased to 88-90% on room air  2 L oxygen resumed    1800 Transport came for MRI   Will reschedule MRI later time, when patient is stable    1810 V/S within normal range but patient is very drowsy  1810 Tremor and shakiness resolved  1815 Q4H BMP lab sent  1915 Q4H BMP re-colleted   1930 Patient fever came back without tremor, twitching or shaking; applied ice to cool     End of Shift Note    Bedside shift change report given to GABRIEL Julien (oncoming nurse) by Charmaine Stein RN (offgoing nurse). Report included the following information SBAR, Kardex, Cardiac Rhythm ST, and Quality Measures    Shift worked:  [id number]     Shift summary and any significant changes:     See note above   Q1 Acu check completed timely    Q6 BMP sent on time   Dual skin assessment completed   Concerns for physician to address:  Recurrent fever, tremor and twitching     Zone phone for oncoming shift:          Activity:  Activity Level:  Up with Assistance  Number times ambulated in hallways past shift: 2  Number of times OOB to chair past shift: 1    Cardiac:   Cardiac Monitoring: Yes      Cardiac Rhythm: Sinus Tachy    Access:  Current line(s): PIV     Genitourinary:   Urinary status: voiding and incontinent    Respiratory:   O2 Device: Nasal cannula  Chronic home O2 use?: NO  Incentive spirometer at bedside: NO       GI:  Last Bowel Movement Date: 01/17/23  Current diet:  ADULT DIET Regular; 4 carb choices (60 gm/meal)  Passing flatus: YES  Tolerating current diet: YES       Pain Management:   Patient states pain is manageable on current regimen: YES    Skin:  Ralph Score: 22  Interventions: increase time out of bed and PT/OT consult    Patient Safety:  Fall Score:  Total Score: 2  Interventions: bed/chair alarm and pt to call before getting OOB       Length of Stay:  Expected LOS: 4d 14h  Actual LOS: [address], RN

## 2023-01-19 ENCOUNTER — APPOINTMENT (OUTPATIENT)
Dept: MRI IMAGING | Age: 68
DRG: 637 | End: 2023-01-19
Attending: INTERNAL MEDICINE
Payer: MEDICARE

## 2023-01-19 LAB
ADMINISTERED INITIALS, ADMINIT: NORMAL
ANION GAP SERPL CALC-SCNC: 8 MMOL/L (ref 5–15)
ANION GAP SERPL CALC-SCNC: 9 MMOL/L (ref 5–15)
ATRIAL RATE: 102 BPM
BACTERIA SPEC CULT: ABNORMAL
BACTERIA SPEC CULT: ABNORMAL
BUN SERPL-MCNC: 12 MG/DL (ref 6–20)
BUN SERPL-MCNC: 13 MG/DL (ref 6–20)
BUN/CREAT SERPL: 11 (ref 12–20)
BUN/CREAT SERPL: 11 (ref 12–20)
CALCIUM SERPL-MCNC: 7.9 MG/DL (ref 8.5–10.1)
CALCIUM SERPL-MCNC: 8 MG/DL (ref 8.5–10.1)
CALCULATED P AXIS, ECG09: 44 DEGREES
CALCULATED R AXIS, ECG10: -48 DEGREES
CALCULATED T AXIS, ECG11: 15 DEGREES
CHLORIDE SERPL-SCNC: 115 MMOL/L (ref 97–108)
CHLORIDE SERPL-SCNC: 117 MMOL/L (ref 97–108)
CO2 SERPL-SCNC: 19 MMOL/L (ref 21–32)
CO2 SERPL-SCNC: 20 MMOL/L (ref 21–32)
CREAT SERPL-MCNC: 1.1 MG/DL (ref 0.7–1.3)
CREAT SERPL-MCNC: 1.2 MG/DL (ref 0.7–1.3)
D50 ADMINISTERED, D50ADM: 0 ML
D50 ORDER, D50ORD: 0 ML
DIAGNOSIS, 93000: NORMAL
ERYTHROCYTE [DISTWIDTH] IN BLOOD BY AUTOMATED COUNT: 15.1 % (ref 11.5–14.5)
GLUCOSE BLD STRIP.AUTO-MCNC: 109 MG/DL (ref 65–117)
GLUCOSE BLD STRIP.AUTO-MCNC: 110 MG/DL (ref 65–117)
GLUCOSE BLD STRIP.AUTO-MCNC: 114 MG/DL (ref 65–117)
GLUCOSE BLD STRIP.AUTO-MCNC: 140 MG/DL (ref 65–117)
GLUCOSE BLD STRIP.AUTO-MCNC: 150 MG/DL (ref 65–117)
GLUCOSE BLD STRIP.AUTO-MCNC: 151 MG/DL (ref 65–117)
GLUCOSE BLD STRIP.AUTO-MCNC: 163 MG/DL (ref 65–117)
GLUCOSE BLD STRIP.AUTO-MCNC: 170 MG/DL (ref 65–117)
GLUCOSE BLD STRIP.AUTO-MCNC: 176 MG/DL (ref 65–117)
GLUCOSE BLD STRIP.AUTO-MCNC: 192 MG/DL (ref 65–117)
GLUCOSE BLD STRIP.AUTO-MCNC: 201 MG/DL (ref 65–117)
GLUCOSE BLD STRIP.AUTO-MCNC: 89 MG/DL (ref 65–117)
GLUCOSE SERPL-MCNC: 190 MG/DL (ref 65–100)
GLUCOSE SERPL-MCNC: 293 MG/DL (ref 65–100)
GLUCOSE, GLC: 109 MG/DL
GLUCOSE, GLC: 110 MG/DL
GLUCOSE, GLC: 114 MG/DL
GLUCOSE, GLC: 150 MG/DL
GLUCOSE, GLC: 163 MG/DL
GLUCOSE, GLC: 170 MG/DL
GLUCOSE, GLC: 201 MG/DL
GLUCOSE, GLC: 89 MG/DL
HCT VFR BLD AUTO: 34.4 % (ref 36.6–50.3)
HGB BLD-MCNC: 10.8 G/DL (ref 12.1–17)
HIGH TARGET, HITG: 180 MG/DL
INSULIN ADMINSTERED, INSADM: 1.3 UNITS/HOUR
INSULIN ADMINSTERED, INSADM: 1.3 UNITS/HOUR
INSULIN ADMINSTERED, INSADM: 1.8 UNITS/HOUR
INSULIN ADMINSTERED, INSADM: 1.9 UNITS/HOUR
INSULIN ADMINSTERED, INSADM: 4.1 UNITS/HOUR
INSULIN ADMINSTERED, INSADM: 5.1 UNITS/HOUR
INSULIN ADMINSTERED, INSADM: 6 UNITS/HOUR
INSULIN ADMINSTERED, INSADM: 6.5 UNITS/HOUR
INSULIN ORDER, INSORD: 1.3 UNITS/HOUR
INSULIN ORDER, INSORD: 1.3 UNITS/HOUR
INSULIN ORDER, INSORD: 1.8 UNITS/HOUR
INSULIN ORDER, INSORD: 1.9 UNITS/HOUR
INSULIN ORDER, INSORD: 4.1 UNITS/HOUR
INSULIN ORDER, INSORD: 5.1 UNITS/HOUR
INSULIN ORDER, INSORD: 6 UNITS/HOUR
INSULIN ORDER, INSORD: 6.5 UNITS/HOUR
LOW TARGET, LOT: 140 MG/DL
MAGNESIUM SERPL-MCNC: 1.9 MG/DL (ref 1.6–2.4)
MAGNESIUM SERPL-MCNC: 1.9 MG/DL (ref 1.6–2.4)
MCH RBC QN AUTO: 27.8 PG (ref 26–34)
MCHC RBC AUTO-ENTMCNC: 31.4 G/DL (ref 30–36.5)
MCV RBC AUTO: 88.4 FL (ref 80–99)
MINUTES UNTIL NEXT BG, NBG: 60 MIN
MULTIPLIER, MUL: 0.03
MULTIPLIER, MUL: 0.04
MULTIPLIER, MUL: 0.04
MULTIPLIER, MUL: 0.05
MULTIPLIER, MUL: 0.06
NRBC # BLD: 0 K/UL (ref 0–0.01)
NRBC BLD-RTO: 0 PER 100 WBC
ORDER INITIALS, ORDINIT: NORMAL
P-R INTERVAL, ECG05: 158 MS
PHOSPHATE SERPL-MCNC: 3 MG/DL (ref 2.6–4.7)
PLATELET # BLD AUTO: 184 K/UL (ref 150–400)
PMV BLD AUTO: 11.2 FL (ref 8.9–12.9)
POTASSIUM SERPL-SCNC: 2.9 MMOL/L (ref 3.5–5.1)
POTASSIUM SERPL-SCNC: 3.3 MMOL/L (ref 3.5–5.1)
Q-T INTERVAL, ECG07: 320 MS
QRS DURATION, ECG06: 92 MS
QTC CALCULATION (BEZET), ECG08: 417 MS
RBC # BLD AUTO: 3.89 M/UL (ref 4.1–5.7)
SERVICE CMNT-IMP: ABNORMAL
SERVICE CMNT-IMP: NORMAL
SODIUM SERPL-SCNC: 144 MMOL/L (ref 136–145)
SODIUM SERPL-SCNC: 144 MMOL/L (ref 136–145)
TRIGL SERPL-MCNC: 682 MG/DL (ref ?–150)
VENTRICULAR RATE, ECG03: 102 BPM
WBC # BLD AUTO: 10.9 K/UL (ref 4.1–11.1)

## 2023-01-19 PROCEDURE — 84478 ASSAY OF TRIGLYCERIDES: CPT

## 2023-01-19 PROCEDURE — 74011000258 HC RX REV CODE- 258: Performed by: CLINICAL NURSE SPECIALIST

## 2023-01-19 PROCEDURE — 80048 BASIC METABOLIC PNL TOTAL CA: CPT

## 2023-01-19 PROCEDURE — 84100 ASSAY OF PHOSPHORUS: CPT

## 2023-01-19 PROCEDURE — 74011250637 HC RX REV CODE- 250/637: Performed by: INTERNAL MEDICINE

## 2023-01-19 PROCEDURE — 99231 SBSQ HOSP IP/OBS SF/LOW 25: CPT | Performed by: CLINICAL NURSE SPECIALIST

## 2023-01-19 PROCEDURE — 36415 COLL VENOUS BLD VENIPUNCTURE: CPT

## 2023-01-19 PROCEDURE — 74011000250 HC RX REV CODE- 250: Performed by: INTERNAL MEDICINE

## 2023-01-19 PROCEDURE — 74011250636 HC RX REV CODE- 250/636: Performed by: INTERNAL MEDICINE

## 2023-01-19 PROCEDURE — 85027 COMPLETE CBC AUTOMATED: CPT

## 2023-01-19 PROCEDURE — 99222 1ST HOSP IP/OBS MODERATE 55: CPT | Performed by: INTERNAL MEDICINE

## 2023-01-19 PROCEDURE — 83735 ASSAY OF MAGNESIUM: CPT

## 2023-01-19 PROCEDURE — 65660000001 HC RM ICU INTERMED STEPDOWN

## 2023-01-19 PROCEDURE — 82962 GLUCOSE BLOOD TEST: CPT

## 2023-01-19 PROCEDURE — 74011250636 HC RX REV CODE- 250/636: Performed by: HOSPITALIST

## 2023-01-19 PROCEDURE — 74011636637 HC RX REV CODE- 636/637: Performed by: CLINICAL NURSE SPECIALIST

## 2023-01-19 PROCEDURE — 70551 MRI BRAIN STEM W/O DYE: CPT

## 2023-01-19 RX ORDER — INSULIN GLARGINE 100 [IU]/ML
25 INJECTION, SOLUTION SUBCUTANEOUS DAILY
Status: DISCONTINUED | OUTPATIENT
Start: 2023-01-19 | End: 2023-01-20

## 2023-01-19 RX ORDER — INSULIN LISPRO 100 [IU]/ML
INJECTION, SOLUTION INTRAVENOUS; SUBCUTANEOUS
Status: DISCONTINUED | OUTPATIENT
Start: 2023-01-19 | End: 2023-01-22 | Stop reason: HOSPADM

## 2023-01-19 RX ORDER — INSULIN LISPRO 100 [IU]/ML
0.05 INJECTION, SOLUTION INTRAVENOUS; SUBCUTANEOUS
Status: DISCONTINUED | OUTPATIENT
Start: 2023-01-19 | End: 2023-01-22 | Stop reason: HOSPADM

## 2023-01-19 RX ORDER — INSULIN GLARGINE 100 [IU]/ML
25 INJECTION, SOLUTION SUBCUTANEOUS DAILY
Status: DISCONTINUED | OUTPATIENT
Start: 2023-01-19 | End: 2023-01-19

## 2023-01-19 RX ORDER — POTASSIUM CHLORIDE 7.45 MG/ML
10 INJECTION INTRAVENOUS
Status: COMPLETED | OUTPATIENT
Start: 2023-01-19 | End: 2023-01-19

## 2023-01-19 RX ORDER — SODIUM CHLORIDE 9 MG/ML
100 INJECTION, SOLUTION INTRAVENOUS CONTINUOUS
Status: DISCONTINUED | OUTPATIENT
Start: 2023-01-19 | End: 2023-01-22 | Stop reason: HOSPADM

## 2023-01-19 RX ADMIN — Medication 5 UNITS: at 12:15

## 2023-01-19 RX ADMIN — FENOFIBRATE 160 MG: 160 TABLET ORAL at 22:41

## 2023-01-19 RX ADMIN — SODIUM CHLORIDE 100 ML/HR: 9 INJECTION, SOLUTION INTRAVENOUS at 22:45

## 2023-01-19 RX ADMIN — CEFTRIAXONE 2 G: 2 INJECTION, POWDER, FOR SOLUTION INTRAMUSCULAR; INTRAVENOUS at 09:26

## 2023-01-19 RX ADMIN — LACTULOSE 30 ML: 20 SOLUTION ORAL at 18:46

## 2023-01-19 RX ADMIN — SODIUM CHLORIDE, PRESERVATIVE FREE 10 ML: 5 INJECTION INTRAVENOUS at 05:36

## 2023-01-19 RX ADMIN — LISINOPRIL 40 MG: 20 TABLET ORAL at 09:26

## 2023-01-19 RX ADMIN — SODIUM CHLORIDE, PRESERVATIVE FREE 10 ML: 5 INJECTION INTRAVENOUS at 22:41

## 2023-01-19 RX ADMIN — SODIUM CHLORIDE, PRESERVATIVE FREE 10 ML: 5 INJECTION INTRAVENOUS at 18:47

## 2023-01-19 RX ADMIN — ENOXAPARIN SODIUM 40 MG: 100 INJECTION SUBCUTANEOUS at 09:26

## 2023-01-19 RX ADMIN — Medication 3 UNITS: at 18:46

## 2023-01-19 RX ADMIN — POTASSIUM CHLORIDE 10 MEQ: 7.46 INJECTION, SOLUTION INTRAVENOUS at 09:26

## 2023-01-19 RX ADMIN — LACTULOSE 30 ML: 20 SOLUTION ORAL at 09:26

## 2023-01-19 RX ADMIN — Medication 3 UNITS: at 12:15

## 2023-01-19 RX ADMIN — DEXTROSE AND SODIUM CHLORIDE 150 ML/HR: 5; 900 INJECTION, SOLUTION INTRAVENOUS at 03:22

## 2023-01-19 RX ADMIN — POTASSIUM CHLORIDE 10 MEQ: 7.46 INJECTION, SOLUTION INTRAVENOUS at 11:08

## 2023-01-19 RX ADMIN — SODIUM CHLORIDE 100 ML/HR: 9 INJECTION, SOLUTION INTRAVENOUS at 22:41

## 2023-01-19 RX ADMIN — INSULIN GLARGINE 25 UNITS: 100 INJECTION, SOLUTION SUBCUTANEOUS at 09:27

## 2023-01-19 RX ADMIN — SODIUM CHLORIDE 100 ML/HR: 9 INJECTION, SOLUTION INTRAVENOUS at 12:17

## 2023-01-19 RX ADMIN — Medication 5 UNITS: at 18:46

## 2023-01-19 NOTE — DIABETES MGMT
[facility]  DIABETES MANAGEMENT CONSULT    Consulted by Provider for advanced nursing evaluation and care for inpatient blood glucose management. Evaluation and Action Plan   Landen Miller is a 80 yo male admitted 1/16/23 with muscle twitching (arms) and urinary incontinence. HHS noted on lab work -BG >600/ triglycerides >2000/ negative ketones. Ammonia level >100. Insulin infusion initiated at admission along with IVF. Re: recent diabetes mgmt, he reports BG >300 consistently and he took himself off Metformin as \"it was not working\" No PCP follow up per his report. He has had bilateral toe amputations in the past , so already experiencing long term complications from uncontrolled diabetes- Noted in hospitalist note he verbalized no prior history of diabetes, so inconsistent information provided by patient. Since triglycerides remain elevated and >2000, would recommend continuing insulin infusion until triglycerides <1000. Will add dextrose to IVF to maintain steady BG levels while triglycerides normalize. Once normalized, will transition patient off insulin infusion to subcutaneous insulin injection per recs below. Noted CMP ordered for AM 1/18/23 which will have triglyceride level in it. Conferred with hospitalist, Dr. Rickie Rojas, re: recs. Triglyceride levels improved today, 682. Will transition off insulin infusion today to subcutaneous insulin orderset per recs below. BG target profile on glucostabilizer 140-180. Management Rationale Action Plan   Medication    When ready to convert  Initiate the subcutaneous insulin order set with:  Basal insulin: 0.3 units/kg/day=25 units Lantus dialy.   First dose now then turn off insulin gtt two hours later  Correctional insulin ACHS at normal sensitivity, start at a glucose of 200  Consistent Carbohydrate diet  Adjust to non-dextrose containing IVF once triglycerides <1000  If patient is experiencing pre-prandial hyperglycemia over 200 on basal and correctional insulin, add Low dose bolus insulin. 0.05 units/kg/meal=4 units Humalog TID  . Hold if patient consumes less than 50% of carbohydrates on meal tray    Additional orders  Case mgmt referral for PCP follow up care        Discharge planning   Medication  Will require T2D medication to manage uncontrolled diabetes. He would not like to be on insulin unless it was \"last resort\"-Recommendations TBD- consider GLP-1 weekly,  with Glipizide - would still benefit greatly from insulin therapy. 2.  Does not want to take Metformin (\"as it did not work\")   Referral  [x]        Outpatient diabetes education   Additional orders            Initial Presentation   Nav Batista is a 79 y.o. male admitted 1/16/23 after experiencing  muscle twitching and urinary incontinence. Per patient over the last 1 to 2 months he has been having worsening urinary incontinence. States for the last 3 to 4 days he has been having muscle twitching and spontaneous a movements; he remains awake during the episode. LAB: BG >600/ anion gap 6/ negative ketones /ammonia 110/ triglyceride >2000/   CXR: No acute intrathoracic disease. CT head: no acute intracranial process      HX:   Past Medical History:   Diagnosis Date    Chronic tophaceous gout     Diabetes ([address]     Hypertension         INITIAL DX:   Acute hepatic encephalopathy [K76.82]     Current Treatment     TX: IVF/ insulin infusion/neuro checks    Hospital Course   Clinical progress has been complicated by acute hepatic encephalopathy in absence of liver disease? /found to be in [facility] on admission and hypertriglyceridemia (>2000) . 1/18/23: Triglycerides improved/ MRI brain today/ ammonia level normalized  1/19/23: RRT called for tachycardia/ and elevated BP- noted hospitalist note re: UTI now with bacteremia likely causing sepsis triggers.      Diabetes History   T2D, current A1C >14%- ED MD note states he was taken off Metformin at some point. No consistent diabetes follow up noted. Diabetes-related Medical History  Acute complications  HHNK  Neurological complications  Peripheral neuropathy  Microvascular disease  none  Macrovascular disease  Foot wounds  Other associated conditions     Gout/ HTN    Diabetes Medication History: Previously prescribed Metformin-took himself off 'months ago'  Key Antihyperglycemic Medications       Patient is on no antihyperglycemic meds. Diabetes self-management practices:   Eating pattern   [x] Eating a carbohydrate-controlled meal plan  [x] Breakfast  eggs  [x] Lunch   PB&J sandwich  [x] Dinner   Some soret of crock pot meal/ bean soup   [x] Beverages  Water/ 1/2 Dr. Nohemy Dc daily   Physical activity pattern-not discussed     Monitoring pattern- Glucometer- BG have been 300s per his report     Taking medications pattern  [x] Consistent administration  [x] Affordable  Social determinants of health impacting diabetes self-management practices   Concerned that you need to know more about how to stay healthy with diabetes  Overall evaluation:    [x] A1c ABOVE target without drug therapy & lacking self-care practices    Subjective   \" I don't want to take insulin\"  Have been discussing a discharge plan that he will agree to since admission. I suggested a once weekly injection (GLP-1 -ozempic) and he was agreeable with that as well as taking Glipizide pill at meals. Retired  after serving [address]       Objective   Physical exam  General Over weight male in no acute distress/. Conversant and cooperative  Neuro  Alert, oriented   Vital Signs Visit Vitals  /76 (BP 1 Location: Right upper arm, BP Patient Position: At rest)   Pulse 85   Temp 98.8 °F (37.1 °C)   Resp 19   Ht 5' 11\" (1.803 m)   Wt 92.4 kg (203 lb 11.3 oz)   SpO2 97%   BMI 28.41 kg/m²     Skin  Warm and dry. Heart   Regular rate and rhythm.  No murmurs, rubs or gallops  Lungs  Clear to auscultation without rales or rhonchi  Extremities No foot wounds    Diabetic foot exam:    Left Foot-5th toe amputation PTA years ago     Visual Exam: normal    Pulse DP: 2+ (normal)   Filament test: reduced sensation      Right Foot-5th toe amputation PTA years ago   Visual Exam: normal    Pulse DP: 2+ (normal)   Filament test: reduced sensation     DP & PT pulses +2. Laboratory  Recent Labs     01/19/23  0455 01/19/23  0054 01/18/23  1926 01/18/23  1019 01/18/23  0132 01/17/23  0248 01/16/23 2007   * 293* 214*   < > 121*   < > 607*   AGAP 9 8 9   < > 8   < > 7   TRIGL 682*  --   --   --  1,276*  --  2,029*   WBC 10.9  --   --   --  7.9  --  7.8   CREA 1.10 1.20 1.23   < > 1.17   < > 1.70*   AST  --   --   --   --   --   --  24   ALT  --   --   --   --   --   --  33    < > = values in this interval not displayed.          Factors impacting BG management  Factor Dose Comments   Nutrition:  Standard meals     45 grams/meal      Other:   Kidney function  Liver function     eGFR >60, cr+ 1.35  AST/ALT WNL  Ammonia >100      Blood glucose pattern    Significant diabetes-related events over the past 24-72 hours  T2D, HHS- current  A1C >14%  Insulin infusion via glucostabilizer-transitioning off infusion today   Basal : Lantus 25 units daily  Bolus: 5 units TID Humalog with meals  Correction: resistant sensitivity  BG target 140-180  Consuming PO diet >50%    Assessment and Nursing Intervention   Nursing Diagnosis Risk for unstable blood glucose pattern   Nursing Intervention Domain 5250 Decision-making Support   Nursing Interventions Examined current inpatient diabetes/blood glucose control   Explored factors facilitating and impeding inpatient management  Explored corrective strategies with patient and responsible inpatient provider   Informed patient of rational for insulin strategy while hospitalized     Nursing Diagnosis 38139 Ineffective Health Management   Nursing Intervention Domain 5250 Decision-making Support   Nursing Interventions Identified diabetes self-management practices impeding diabetes control  Discussed diabetes survival skills related to  Healthy Plate eating plan; given handouts  Role of physical activity in improving insulin sensitivity and action  Procedure for blood glucose monitoring & options for low-cost products  Medications plan at discharge     Billing Code(s)   67120     Before making these care recommendations, I personally reviewed the hospitalization record, including notes, laboratory & diagnostic data and current medications, and examined the patient at the bedside (circumstances permitting) before determining care. More than fifty (50) percent of the time was spent in patient counseling and/or care coordination.   Total minutes: 509 TONNY Bazan, CNS  Diabetes Clinical Nurse Specialist  [facility]  Access via [address] [place] [address]

## 2023-01-19 NOTE — PROGRESS NOTES
[address] [facility] Adult  Hospitalist Group                                                                                          Hospitalist Progress Note  Kaylie King MD  Answering service: [phone] from in house phone        Date of Service:  2023  NAME:  Elida Fields  :  1955  MRN:  [id number]      Admission Summary: This is a 61-year-old man with past medical history significant for type 2 diabetes, chronic kidney disease, and hypertension; presented at the emergency room with muscle twitching and urinary incontinence. The patient's symptoms have been going on for a couple of months and progressively getting worse. In the last couple of days, the patient has been having frequent spontaneous muscle twitching as well as urinary incontinence. The patient is alert with each episode. The patient has no prior history of seizure disorder and denies any history of DM. He does not have a PCP. Upon arrival, patient was lethargic, had an elevated Ammonia level, and a blood glucose of 552. He was started on an Insulin drip on admission. He was admitted for further work-up and treatment. Interval history / Subjective:     Patient seen and examined weaning of from insulin drip discussed with diabetic management  Patient with sepsis 2/2 UTI and Bacteremia. Assessment & Plan:        Acute metabolic encephalopathy:   - dehydration vs hepatic encephalopathy;   - no history of liver disease;   - started lactulose;   - repeat ammonia level: 110 on admission --> 54;   - B12 within normal range;   - folic acid level within normal range;    - drug screen negative;      Sepsis, present on admission:  - source of infection: UTI with bacteremia;   - urine cx: Gram negative rods;   - blood cx: probable Klebsiella oxytoca growing in 4/4 bottles;  - c/s to Rocephin cont 2 weeks    Type 2 diabetes with hyperosmolar nonketotic hyperglycemia:   - new diagnosis of DM;   - Appreciate DM team consult and recommendations;  -Insulin drip to wean off placed on basal insulin  - transition to Lantus and Lispro SSI;   - HgA1c >14;    Hyponatremia: Pseudohyponatremia due to hyperglycemia- hypovolemic;  - IVFs and monitor;     Elevated Creatinine:  - OKSANA vs CKD;   --Resolved with IV fluid    Hypertension:    - patient has taken Lisinopril in the past  Patient will need a PCP    Tremors:  - concerning for seizure activity;  - Neuro consulted;   - EEG negative;  -Requested MRI    Urinary incontinence:    - most likely due to UTI;   - CT scan of the abdomen and pelvis --bilateral nonobstructing renal calculi will eventually need outpatient follow-up with urology       Code status: Full   Prophylaxis: B [address] discussed with: patient  Anticipated Disposition: home in 2-3 days;     CRITICAL CARE ATTESTATION:  I had a face to face encounter with the patient, reviewed and interpreted patient data including clinical events, labs, images, vital signs, I/O's, and examined patient. I have discussed the case and the plan and management of the patient's care with the consulting services, the bedside nurses and necessary ancillary providers. NOTE OF PERSONAL INVOLVEMENT IN CARE   This patient has a high probability of imminent, clinically significant deterioration, which requires the highest level of preparedness to intervene urgently. I participated in the decision-making and personally managed or directed the management of the following life and organ supporting interventions that required my frequent assessment to treat or prevent imminent deterioration. I personally spent 45 minutes of critical care time. This is time spent at this critically ill patient's bedside actively involved in patient care as well as the coordination of care and discussions with the patient's family. This does not include any procedural time which has been billed separately.         Hospital Problems  Date Reviewed: 1/17/2023            Codes Class Noted POA    * (Principal) Type 2 diabetes mellitus with hyperosmolar nonketotic hyperglycemia (HCC) ICD-10-CM: E11.00  ICD-9-CM: 250.20  1/17/2023 Yes        Acute hepatic encephalopathy ICD-10-CM: K76.82  ICD-9-CM: 572.2  1/16/2023 Unknown         Review of Systems:   A comprehensive review of systems was negative except for that written in the HPI. Vital Signs:    Last 24hrs VS reviewed since prior progress note. Most recent are:  Visit Vitals  /79 (BP 1 Location: Left upper arm, BP Patient Position: At rest)   Pulse 93   Temp 98.1 °F (36.7 °C)   Resp 21   Ht 5' 11\" (1.803 m)   Wt 92.4 kg (203 lb 11.3 oz)   SpO2 98%   BMI 28.41 kg/m²         Intake/Output Summary (Last 24 hours) at 1/19/2023 1256  Last data filed at 1/19/2023 1034  Gross per 24 hour   Intake 240 ml   Output 1400 ml   Net -1160 ml          Physical Examination:     I had a face to face encounter with this patient and independently examined them on 1/19/2023 as outlined below:          Constitutional:  No acute distress, cooperative, pleasant    ENT:  Oral mucosa moist, oropharynx benign. Resp:  CTA bilaterally. No wheezing/rhonchi/rales. No accessory muscle use. CV:  Regular rhythm, normal rate, no murmurs, gallops, rubs    GI:  Soft, non distended, non tender. normoactive bowel sounds, no hepatosplenomegaly     Musculoskeletal:  No edema, warm, 2+ pulses throughout    Neurologic:  Moves all extremities.   AAOx3, CN II-XII reviewed            Data Review:    Review and/or order of clinical lab test  Review and/or order of tests in the radiology section of CPT  Review and/or order of tests in the medicine section of CPT      Labs:     Recent Labs     01/19/23  0455 01/18/23  0132   WBC 10.9 7.9   HGB 10.8* 12.6   HCT 34.4* 40.9    205       Recent Labs     01/19/23  0455 01/19/23  0054 01/18/23  1926 01/18/23  1019 01/18/23  0132 01/17/23  1832 01/17/23  1426    144 140   < > 138   < > 136   K 3.3* 2.9* 3.4*   < > 3.8   < > 3.7   * 117* 112*   < > 110*   < > 103   CO2 20* 19* 19*   < > 20*   < > 26   BUN 12 13 16   < > 19   < > 22*   CREA 1.10 1.20 1.23   < > 1.17   < > 1.25   * 293* 214*   < > 121*   < > 235*   CA 8.0* 7.9* 8.2*   < > 8.1*   < > 9.1   MG 1.9 1.9 1.7   < > 2.0   < > 2.2   PHOS 3.0  --   --   --  3.1  --  2.7    < > = values in this interval not displayed. Recent Labs     01/16/23 2007   ALT 33   AP 87   TBILI 0.5   TP 8.6*   ALB 3.0*   GLOB 5.6*       No results for input(s): INR, PTP, APTT, INREXT, INREXT in the last 72 hours. No results for input(s): FE, TIBC, PSAT, FERR in the last 72 hours. Lab Results   Component Value Date/Time    Folate 9.0 01/16/2023 08:07 PM        No results for input(s): PH, PCO2, PO2 in the last 72 hours. No results for input(s): CPK, CKNDX, TROIQ in the last 72 hours.     No lab exists for component: CPKMB  Lab Results   Component Value Date/Time    Cholesterol, total 286 (H) 01/18/2023 01:32 AM    HDL Cholesterol 29 01/18/2023 01:32 AM    LDL,Direct 64 01/18/2023 01:32 AM    LDL, calculated Not calculated due to elevated triglyceride level 01/18/2023 01:32 AM    Triglyceride 682 (H) 01/19/2023 04:55 AM    CHOL/HDL Ratio 9.9 (H) 01/18/2023 01:32 AM     Lab Results   Component Value Date/Time    Glucose (POC) 176 (H) 01/19/2023 11:56 AM    Glucose (POC) 109 01/19/2023 10:47 AM    Glucose (POC) 110 01/19/2023 09:37 AM    Glucose (POC) 150 (H) 01/19/2023 08:02 AM    Glucose (POC) 151 (H) 01/19/2023 06:57 AM     Lab Results   Component Value Date/Time    Color YELLOW/STRAW 01/17/2023 07:15 AM    Appearance CLOUDY (A) 01/17/2023 07:15 AM    Specific gravity 1.010 01/17/2023 07:15 AM    pH (UA) 6.0 01/17/2023 07:15 AM    Protein TRACE (A) 01/17/2023 07:15 AM    Glucose >1,000 (A) 01/17/2023 07:15 AM    Ketone Negative 01/17/2023 07:15 AM    Bilirubin Negative 01/17/2023 07:15 AM    Urobilinogen 0.2 01/17/2023 07:15 AM Nitrites Positive (A) 01/17/2023 07:15 AM    Leukocyte Esterase SMALL (A) 01/17/2023 07:15 AM    Epithelial cells FEW 01/17/2023 07:15 AM    Bacteria 1+ (A) 01/17/2023 07:15 AM    WBC 20-50 01/17/2023 07:15 AM    RBC 0-5 01/17/2023 07:15 AM         Medications Reviewed:     Current Facility-Administered Medications   Medication Dose Route Frequency    dextrose 10 % infusion 0-250 mL  0-250 mL IntraVENous PRN    insulin lispro (HUMALOG) injection 5 Units  0.05 Units/kg SubCUTAneous TID WITH MEALS    insulin lispro (HUMALOG) injection   SubCUTAneous AC&HS    insulin glargine (LANTUS) injection 25 Units  25 Units SubCUTAneous DAILY    0.9% sodium chloride infusion  100 mL/hr IntraVENous CONTINUOUS    cefTRIAXone (ROCEPHIN) 2 g in 0.9% sodium chloride 20 mL IV syringe  2 g IntraVENous Q24H    fenofibrate (LOFIBRA) tablet 160 mg  160 mg Oral QHS    acetaminophen (TYLENOL) tablet 650 mg  650 mg Oral Q4H PRN    lisinopriL (PRINIVIL, ZESTRIL) tablet 40 mg  40 mg Oral DAILY    sodium chloride (NS) flush 5-40 mL  5-40 mL IntraVENous Q8H    sodium chloride (NS) flush 5-40 mL  5-40 mL IntraVENous PRN    polyethylene glycol (MIRALAX) packet 17 g  17 g Oral DAILY PRN    ondansetron (ZOFRAN ODT) tablet 4 mg  4 mg Oral Q8H PRN    Or    ondansetron (ZOFRAN) injection 4 mg  4 mg IntraVENous Q6H PRN    enoxaparin (LOVENOX) injection 40 mg  40 mg SubCUTAneous DAILY    lactulose (CHRONULAC) 10 gram/15 mL solution 30 mL  30 mL Oral TID    glucose chewable tablet 16 g  4 Tablet Oral PRN    glucagon (GLUCAGEN) injection 1 mg  1 mg IntraMUSCular PRN     ______________________________________________________________________  EXPECTED LENGTH OF STAY: 4d 14h  ACTUAL LENGTH OF STAY:          3                 Soledad Gomez MD

## 2023-01-19 NOTE — PROGRESS NOTES
Primary Nurse Carmen Álvarez, RN and Otilio Isidro, RN, RN performed a dual skin assessment on this patient Impairment noted- see wound doc flow sheet  - Redness and small skin tear on mid sacrum  - Ralph score is 17

## 2023-01-19 NOTE — CONSULTS
[facility], MD, FACP, Jovi Berkley [place], [place]      NICOLE Jefferson, PCNP-BC   Billey Goodpasture, [facility]-AG   Tammy Sacramento, FNYVES-C Izora Hashimoto, FNP-C   Mohsen Velasquez, AGPCNP-BC      [address]   at [address], [address], [place]  [address].   [phone]   FAX: [phone]  [facility]   at [address], [address] [place] [address], [address] [place] [address] Street - [address]   [phone]   FAX: [phone]       HEPATOLOGY CONSULT NOTE  I was asked to see this patient in consultation for management of hepatic encephalopathy. I have reviewed the   Emergency room note, Hospital admission note, Notes by all other physicians who have seen the patient during this hospitalization to date. I have reviewed the problem list, all past medical history and the reason for this hospitalization. I have reviewed the allergies and the medications the patient was taking at home prior to this hospitalization. HISTORY:  The patient is a 79 y.o. male without history of chronic liver dsiease. He developed muscle twitching over several days then confusion and he was brought to the ED by his family. In the ED Laboratory studies were significant for:    WBC 7.8, HB 13.9 gms, , Sna 125, Scr 1.7 mg, AST 24, ALT 33, ALP 27, TBILI 0.5 mg, ALB 3.0 gm, Sammonia 110,     Imaging of the liver with non-contrast CT scan demonstrated a normal appearing liver. NO liver mass. No ascites. Hospital Course to date:  He has been treated with lactulose and ammonia has come down. He is now alert and oriented. MRI brain suggests chronic small vessel ischemia    Hepatology Plan:  MRI w/wo IV contrast to look for mesenteric shunting   Reduce lactulose to BID  Serology for causes of chronic liver disease.       ASSESSMENT AND PLAN:  Elevated serum ammonia  This was markedly elevated when he arrived in ED and he was somewhat confused and slow in mentation per ED notes. With lactulose serum ammonia is down to the 50s. There is now no confusion    Most common cause of elevated ammonia is cirrhosis. But there is no apparent cirrhosis by imaging or labs. He does have features of metabolic syndrome and could have NAFLD  This will be assess by Fibroscan in the office after hospital discharge    Second most common cause is congential shunting allowing mesenteric blood to bypass liver. These shunts can be very small and asymptomatic for many decades but then start to slowly enlarge. If present these shunts can be occluded. Reduce lactulose BID     SYSTEM REVIEW NOT RELATED TO LIVER DISEASE OR REVIEWED ABOVE:  Constitution systems: Negative for fever, chills, weight gain, weight loss. Eyes: Negative for visual changes. ENT: Negative for sore throat, painful swallowing. Respiratory: Negative for cough, hemoptysis, SOB. Cardiology: Negative for chest pain, palpitations. GI:  Negative for constipation or diarrhea. : Negative for urinary frequency, dysuria, hematuria, nocturia. Skin: Negative for rash. Hematology: Negative for easy bruising, blood clots. Musculo-skelatal: Negative for back pain, muscle pain, weakness. Neurologic: Negative for headaches, dizziness, vertigo, memory problems not related to HE. Psychology: Negative for anxiety, depression. FAMILY HISTORY:  The father  of scleroderma. The mother  of sepsis. There is no family history of liver disease. SOCIAL HISTORY:  The patient is . The patient has 1 child. The patient has never used tobacco products. The patient has previously consumed alcohol socially never in excess. 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 The patient used to work for [employer]. The patient retired in .         PHYSICAL EXAMINATION:  VS: per nursing note  General:  No acute distress. Eyes:  Sclera anicteric. ENT:  No oral lesions. Thyroid normal.  Nodes:  No adenopathy. Skin:  No spider angiomata. No jaundice. Respiratory:  Lungs clear to auscultation. Cardiovascular:  Regular heart rate. Abdomen:  Soft non-tender, No obvious ascites. Extremities:  No lower extremity edema. Neurologic:  Alert and oriented. Cranial nerves grossly intact. No asterixis. LABORATORY:   Latest Reference Range & Units 1/16/23 20:07 1/17/23 02:48 1/18/23 01:32 1/19/23 04:55   WBC 4.1 - 11.1 K/uL 7.8  7.9 10.9   HGB 12.1 - 17.0 g/dL 13.9  12.6 10.8 (L)   PLATELET 082 - 578 K/uL 240  205 184   Sodium 136 - 145 mmol/L 125 (L) 130 (L) 138 144   Potassium 3.5 - 5.1 mmol/L 4.3 4.0 3.8 3.3 (L)   Chloride 97 - 108 mmol/L 90 (L) 100 110 (H) 115 (H)   CO2 21 - 32 mmol/L 28 23 20 (L) 20 (L)   Glucose 65 - 100 mg/dL 607 (HH) 411 (H) 121 (H) 190 (H)   BUN 6 - 20 MG/DL 25 (H) 24 (H) 19 12   Creatinine 0.70 - 1.30 MG/DL 1.70 (H) 1.35 (H) 1.17 1.10   Bilirubin, total 0.2 - 1.0 MG/DL 0.5      Albumin 3.5 - 5.0 g/dL 3.0 (L)      ALT 12 - 78 U/L 33      AST 15 - 37 U/L 24      Alk.  phosphatase 45 - 117 U/L 87      Ammonia, plasma <32 UMOL/L 110 (H) 54 (H)     (HH): Data is critically high  (L): Data is abnormally low  (H): Data is abnormally high      MD Dolly [address], [place] [address]  [address]ol[address]  [address]. [phone]  FAX:  [phone]

## 2023-01-20 LAB
AMMONIA PLAS-SCNC: <10 UMOL/L
BACTERIA SPEC CULT: ABNORMAL
BACTERIA SPEC CULT: ABNORMAL
BACTERIA SPEC CULT: NORMAL
BACTERIA SPEC CULT: NORMAL
CC UR VC: ABNORMAL
ERYTHROCYTE [DISTWIDTH] IN BLOOD BY AUTOMATED COUNT: 15 % (ref 11.5–14.5)
GLUCOSE BLD STRIP.AUTO-MCNC: 115 MG/DL (ref 65–117)
GLUCOSE BLD STRIP.AUTO-MCNC: 133 MG/DL (ref 65–117)
GLUCOSE BLD STRIP.AUTO-MCNC: 195 MG/DL (ref 65–117)
GLUCOSE BLD STRIP.AUTO-MCNC: 197 MG/DL (ref 65–117)
HCT VFR BLD AUTO: 35.4 % (ref 36.6–50.3)
HGB BLD-MCNC: 11.4 G/DL (ref 12.1–17)
MCH RBC QN AUTO: 28.1 PG (ref 26–34)
MCHC RBC AUTO-ENTMCNC: 32.2 G/DL (ref 30–36.5)
MCV RBC AUTO: 87.2 FL (ref 80–99)
NRBC # BLD: 0 K/UL (ref 0–0.01)
NRBC BLD-RTO: 0 PER 100 WBC
PLATELET # BLD AUTO: 209 K/UL (ref 150–400)
PMV BLD AUTO: 10.8 FL (ref 8.9–12.9)
RBC # BLD AUTO: 4.06 M/UL (ref 4.1–5.7)
SERVICE CMNT-IMP: ABNORMAL
SERVICE CMNT-IMP: NORMAL
SERVICE CMNT-IMP: NORMAL
TRIGL SERPL-MCNC: 756 MG/DL (ref ?–150)
WBC # BLD AUTO: 9 K/UL (ref 4.1–11.1)

## 2023-01-20 PROCEDURE — 74011250637 HC RX REV CODE- 250/637: Performed by: INTERNAL MEDICINE

## 2023-01-20 PROCEDURE — 99232 SBSQ HOSP IP/OBS MODERATE 35: CPT | Performed by: CLINICAL NURSE SPECIALIST

## 2023-01-20 PROCEDURE — 74011250636 HC RX REV CODE- 250/636: Performed by: INTERNAL MEDICINE

## 2023-01-20 PROCEDURE — 65660000001 HC RM ICU INTERMED STEPDOWN

## 2023-01-20 PROCEDURE — 77010033678 HC OXYGEN DAILY

## 2023-01-20 PROCEDURE — 82962 GLUCOSE BLOOD TEST: CPT

## 2023-01-20 PROCEDURE — 74011250636 HC RX REV CODE- 250/636: Performed by: HOSPITALIST

## 2023-01-20 PROCEDURE — 36415 COLL VENOUS BLD VENIPUNCTURE: CPT

## 2023-01-20 PROCEDURE — 94762 N-INVAS EAR/PLS OXIMTRY CONT: CPT

## 2023-01-20 PROCEDURE — 99231 SBSQ HOSP IP/OBS SF/LOW 25: CPT | Performed by: INTERNAL MEDICINE

## 2023-01-20 PROCEDURE — 82140 ASSAY OF AMMONIA: CPT

## 2023-01-20 PROCEDURE — 85027 COMPLETE CBC AUTOMATED: CPT

## 2023-01-20 PROCEDURE — 74011636637 HC RX REV CODE- 636/637: Performed by: CLINICAL NURSE SPECIALIST

## 2023-01-20 PROCEDURE — 74011000250 HC RX REV CODE- 250: Performed by: INTERNAL MEDICINE

## 2023-01-20 PROCEDURE — 84478 ASSAY OF TRIGLYCERIDES: CPT

## 2023-01-20 RX ORDER — INSULIN GLARGINE 100 [IU]/ML
30 INJECTION, SOLUTION SUBCUTANEOUS DAILY
Status: DISCONTINUED | OUTPATIENT
Start: 2023-01-20 | End: 2023-01-22 | Stop reason: HOSPADM

## 2023-01-20 RX ORDER — DULAGLUTIDE 0.75 MG/.5ML
0.75 INJECTION, SOLUTION SUBCUTANEOUS
Qty: 5 EACH | Refills: 0 | Status: SHIPPED | OUTPATIENT
Start: 2023-01-20 | End: 2023-01-22

## 2023-01-20 RX ADMIN — Medication 3 UNITS: at 13:42

## 2023-01-20 RX ADMIN — SODIUM CHLORIDE, PRESERVATIVE FREE 10 ML: 5 INJECTION INTRAVENOUS at 22:57

## 2023-01-20 RX ADMIN — LACTULOSE 30 ML: 20 SOLUTION ORAL at 10:38

## 2023-01-20 RX ADMIN — SODIUM CHLORIDE, PRESERVATIVE FREE 10 ML: 5 INJECTION INTRAVENOUS at 10:40

## 2023-01-20 RX ADMIN — LISINOPRIL 40 MG: 20 TABLET ORAL at 10:38

## 2023-01-20 RX ADMIN — CEFTRIAXONE 2 G: 2 INJECTION, POWDER, FOR SOLUTION INTRAMUSCULAR; INTRAVENOUS at 10:40

## 2023-01-20 RX ADMIN — LACTULOSE 30 ML: 20 SOLUTION ORAL at 18:23

## 2023-01-20 RX ADMIN — SODIUM CHLORIDE, PRESERVATIVE FREE 10 ML: 5 INJECTION INTRAVENOUS at 13:43

## 2023-01-20 RX ADMIN — Medication 5 UNITS: at 13:42

## 2023-01-20 RX ADMIN — INSULIN GLARGINE 30 UNITS: 100 INJECTION, SOLUTION SUBCUTANEOUS at 10:39

## 2023-01-20 RX ADMIN — ACETAMINOPHEN 650 MG: 325 TABLET ORAL at 13:48

## 2023-01-20 RX ADMIN — Medication 3 UNITS: at 10:40

## 2023-01-20 RX ADMIN — FENOFIBRATE 160 MG: 160 TABLET ORAL at 22:50

## 2023-01-20 RX ADMIN — ACETAMINOPHEN 650 MG: 325 TABLET ORAL at 22:55

## 2023-01-20 RX ADMIN — SODIUM CHLORIDE 100 ML/HR: 9 INJECTION, SOLUTION INTRAVENOUS at 10:41

## 2023-01-20 RX ADMIN — Medication 5 UNITS: at 10:39

## 2023-01-20 RX ADMIN — ENOXAPARIN SODIUM 40 MG: 100 INJECTION SUBCUTANEOUS at 10:38

## 2023-01-20 NOTE — PROGRESS NOTES
[address] [facility], MD, FACP, [address] [place], [place]      Dirk Ayla, NICOLE Forrester, AGPCNP-BC   Devan Mercer, [facility]-   Micha Rice, ANNABEL Subramanian, FNPANY Ruvalcaba, AGPCNP-BC      [address]   at [address], [address]  [address].   [phone]   FAX: [phone]  [facility] [place] [facility]   at [address], [address] [facility], [address]   [phone]   FAX: [phone]       HEPATOLOGY PROGRESS NOTE  The patient is a 79 y.o. male without history of chronic liver dsiease. He developed muscle twitching over several days then confusion and he was brought to the ED by his family. In the ED Laboratory studies were significant for:    WBC 7.8, HB 13.9 gms, , Sna 125, Scr 1.7 mg, AST 24, ALT 33, ALP 27, TBILI 0.5 mg, ALB 3.0 gm, Sammonia 110,     Imaging of the liver with non-contrast CT scan demonstrated a normal appearing liver. NO liver mass. No ascites. Hospital Course to date:  He has been treated with lactulose and ammonia has come down. He is now alert and oriented. MRI brain suggests chronic small vessel ischemia    Hepatology Plan:  MRI w/wo IV contrast to look for mesenteric shunting. Still waiting for this to be done. Hopefully tonight   Serology for causes of chronic liver disease pending. ASSESSMENT AND PLAN:  Elevated serum ammonia  This was markedly elevated when he arrived in ED and he was somewhat confused and slow in mentation per ED notes. With lactulose serum ammonia is down to the 50s. There is now no confusion    Most common cause of elevated ammonia is cirrhosis. But there is no apparent cirrhosis by imaging or labs.   He does have features of metabolic syndrome and could have NAFLD  This will be assess by Fibroscan in the office after hospital discharge    Second most common cause is congential shunting allowing mesenteric blood to bypass liver. These shunts can be very small and asymptomatic for many decades but then start to slowly enlarge. If present these shunts can be occluded. Reduce lactulose BID     PHYSICAL EXAMINATION:  VS: per nursing note  General:  No acute distress. Eyes:  Sclera anicteric. ENT:  No oral lesions. Thyroid normal.  Nodes:  No adenopathy. Skin:  No spider angiomata. No jaundice. Respiratory:  Lungs clear to auscultation. Cardiovascular:  Regular heart rate. Abdomen:  Soft non-tender, No obvious ascites. Extremities:  No lower extremity edema. Neurologic:  Alert and oriented. Cranial nerves grossly intact. No asterixis.     LABORATORY:   Latest Reference Range & Units 1/19/23 04:55 1/20/23 02:15   WBC 4.1 - 11.1 K/uL 10.9 9.0   HGB 12.1 - 17.0 g/dL 10.8 (L) 11.4 (L)   PLATELET 850 - 872 K/uL 184 209   Sodium 136 - 145 mmol/L 144    Potassium 3.5 - 5.1 mmol/L 3.3 (L)    Chloride 97 - 108 mmol/L 115 (H)    CO2 21 - 32 mmol/L 20 (L)    Glucose 65 - 100 mg/dL 190 (H)    BUN 6 - 20 MG/DL 12    Creatinine 0.70 - 1.30 MG/DL 1.10    (L): Data is abnormally low  (H): Data is abnormally high      Mando Martin MD   [address] [address] [address]  [place]  [address]. [phone]  FAX:  [phone]

## 2023-01-20 NOTE — DIABETES MGMT
[facility]  DIABETES MANAGEMENT CONSULT    Consulted by Provider for advanced nursing evaluation and care for inpatient blood glucose management. Evaluation and Action Plan   Minetta Litten is a 80 yo male admitted 1/16/23 with muscle twitching (arms) and urinary incontinence. HHS noted on lab work -BG >600/ triglycerides >2000/ negative ketones. Ammonia level >100. Insulin infusion initiated at admission along with IVF. Re: recent diabetes mgmt, he reports BG >300 consistently and he took himself off Metformin as \"it was not working\" No PCP follow up per his report. He has had bilateral toe amputations in the past , so already experiencing long term complications from uncontrolled diabetes- Noted in hospitalist note he verbalized no prior history of diabetes, so inconsistent information provided by patient. Since triglycerides remain elevated and >2000, would recommend continuing insulin infusion until triglycerides <1000. Will add dextrose to IVF to maintain steady BG levels while triglycerides normalize. Once normalized, will transition patient off insulin infusion to subcutaneous insulin injection per recs below. Noted CMP ordered for AM 1/18/23 which will have triglyceride level in it. Conferred with hospitalist, Dr. Aden Thompson, re: recs. BG trends improved on basal/bolus /correctional insulin regimen. Discharging likely on 1/21/23. Conferred with hospitalist, Dr. Gilbert Alexander, re : appropriate discharge plan. Patient remains resistant to insulin and metformin at this time. However, is willing to take once weekly injection to start. Management Rationale Action Plan   Medication    When ready to convert  Initiate the subcutaneous insulin order set with:  Basal insulin: 0.3 units/kg/day=30 units Lantus dialy.   First dose now then turn off insulin gtt two hours later  Correctional insulin ACHS at normal sensitivity, start at a glucose of 200  Consistent Carbohydrate diet  Adjust to non-dextrose containing IVF once triglycerides <1000  If patient is experiencing pre-prandial hyperglycemia over 200 on basal and correctional insulin, add Low dose bolus insulin. 0.05 units/kg/meal=5 units Humalog TID  . Hold if patient consumes less than 50% of carbohydrates on meal tray    Additional orders  Case mgmt referral for PCP follow up care-        Discharge planning   Medication  Will require T2D medication to manage uncontrolled diabetes. He would not like to be on insulin unless it was \"last resort\"-Recommendations TBD- consider GLP-1 weekly, OR Januvia daily, with Glipizide - would still benefit greatly from insulin therapy. Trulicity 2.76RJ weekly for 4 weeks, may increase to 1.5mg once weekly for 4 weeks- if tolerating.- script sent to pharmacy on file  3. Has glucometer and supplies at home-    Referral  [x]        Outpatient diabetes education   Additional orders            Initial Presentation   Casi Ramos is a 79 y.o. male admitted 1/16/23 after experiencing  muscle twitching and urinary incontinence. Per patient over the last 1 to 2 months he has been having worsening urinary incontinence. States for the last 3 to 4 days he has been having muscle twitching and spontaneous a movements; he remains awake during the episode. LAB: BG >600/ anion gap 6/ negative ketones /ammonia 110/ triglyceride >2000/   CXR: No acute intrathoracic disease. CT head: no acute intracranial process      HX:   Past Medical History:   Diagnosis Date    Chronic tophaceous gout     Diabetes ([facility] [address]     Hypertension         INITIAL DX:   Acute hepatic encephalopathy [K76.82]     Current Treatment     TX: IVF/ insulin infusion/neuro checks    Hospital Course   Clinical progress has been complicated by acute hepatic encephalopathy in absence of liver disease? /found to be in [facility] on admission and hypertriglyceridemia (>2000) .    1/18/23: Triglycerides improved/ MRI brain today/ ammonia level normalized  Diabetes History   T2D, current A1C >14%- ED MD note states he was taken off Metformin at some point. No consistent diabetes follow up noted. Diabetes-related Medical History  Acute complications  HHNK  Neurological complications  Peripheral neuropathy  Microvascular disease  none  Macrovascular disease  Foot wounds  Other associated conditions     Gout/ HTN    Diabetes Medication History: Previously prescribed Metformin-took himself off 'months ago'  Key Antihyperglycemic Medications       Patient is on no antihyperglycemic meds. Diabetes self-management practices:   Eating pattern   [x] Eating a carbohydrate-controlled meal plan  [x] Breakfast  eggs  [x] Lunch   PB&J sandwich  [x] Dinner   Some soret of crock pot meal/ bean soup   [x] Beverages  Water/ 1/2 Dr. Antonio Paniagua daily   Physical activity pattern-not discussed     Monitoring pattern- Glucometer- BG have been 300s per his report     Taking medications pattern  [x] Consistent administration  [x] Affordable  Social determinants of health impacting diabetes self-management practices   Concerned that you need to know more about how to stay healthy with diabetes  Overall evaluation:    [x] A1c ABOVE target without drug therapy & lacking self-care practices    Subjective   \" I am going home tomorrow! \"  Discussed discharge plan with shared decision making that he will be willing to do to improve diabetes mgmt. Remains resistant to insulin and Metformin at this time despite my encouragement as well as MD encouragement. Wife ,April, at bedside made aware of plan -   Discussed importance of diet adherence and exercise to further improve diabetes mgmt.      Retired  after serving [address]       Objective   Physical exam  General Over weight male in no acute distress/. Conversant and cooperative  Neuro  Alert, oriented   Vital Signs Visit Vitals  /84 (BP 1 Location: Left upper arm, BP Patient Position: At rest)   Pulse 92 Temp 98.6 °F (37 °C)   Resp 18   Ht 5' 11\" (1.803 m)   Wt 92.4 kg (203 lb 11.3 oz)   SpO2 97%   BMI 28.41 kg/m²     Skin  Warm and dry. Heart   Regular rate and rhythm. No murmurs, rubs or gallops  Lungs  Clear to auscultation without rales or rhonchi  Extremities No foot wounds    Diabetic foot exam:    Left Foot-5th toe amputation PTA years ago     Visual Exam: normal    Pulse DP: 2+ (normal)   Filament test: reduced sensation      Right Foot-5th toe amputation PTA years ago   Visual Exam: normal    Pulse DP: 2+ (normal)   Filament test: reduced sensation     DP & PT pulses +2. Laboratory  Recent Labs     01/20/23  0215 01/19/23  0455 01/19/23  0054 01/18/23  1926 01/18/23  1019 01/18/23  0132   GLU  --  190* 293* 214*   < > 121*   AGAP  --  9 8 9   < > 8   TRIGL 756* 682*  --   --   --  1,276*   WBC 9.0 10.9  --   --   --  7.9   CREA  --  1.10 1.20 1.23   < > 1.17    < > = values in this interval not displayed.          Factors impacting BG management  Factor Dose Comments   Nutrition:  Standard meals     45 grams/meal      Other:   Kidney function  Liver function     eGFR >60, cr+ 1.35  AST/ALT WNL  Ammonia >100      Blood glucose pattern    Significant diabetes-related events over the past 24-72 hours  T2D, HHS- current  A1C >14%  Insulin infusion via glucostabilizer-Transitioned off 1/19/23  Basal: 25 units Lantus initiated 1/19/23-->advanced to 30 units daily 1/20/23  Bolus: Humalog 5 units TID- imitated 1/19/23  Correction: normal sensitivity  Fasting    BG target 140-180  Consuming PO diet >50%    Assessment and Nursing Intervention   Nursing Diagnosis Risk for unstable blood glucose pattern   Nursing Intervention Domain 8895 Decision-making Support   Nursing Interventions Examined current inpatient diabetes/blood glucose control   Explored factors facilitating and impeding inpatient management  Explored corrective strategies with patient and responsible inpatient provider   Informed patient of rational for insulin strategy while hospitalized     Nursing Diagnosis 84971 Ineffective Health Management   Nursing Intervention Domain 8964 Decision-making Support   Nursing Interventions Identified diabetes self-management practices impeding diabetes control  Discussed diabetes survival skills related to  Healthy Plate eating plan; given handouts  Role of physical activity in improving insulin sensitivity and action  Procedure for blood glucose monitoring & options for low-cost products  Medications plan at discharge     Billing Code(s)   71182     Before making these care recommendations, I personally reviewed the hospitalization record, including notes, laboratory & diagnostic data and current medications, and examined the patient at the bedside (circumstances permitting) before determining care. More than fifty (50) percent of the time was spent in patient counseling and/or care coordination.   Total minutes: [address] AGNES Maldonado Cap  Diabetes Clinical Nurse Specialist  [facility]  Access via [address] [place] [address]

## 2023-01-20 NOTE — PROGRESS NOTES
[facility]                                                                                          Hospitalist Progress Note  Rio Leyva MD  Answering service: [phone] from in house phone        Date of Service:  2023  NAME:  Joo Frederick  :  1955  MRN:  [id number]      Admission Summary: This is a 61-year-old man with past medical history significant for type 2 diabetes, chronic kidney disease, and hypertension; presented at the emergency room with muscle twitching and urinary incontinence. The patient's symptoms have been going on for a couple of months and progressively getting worse. In the last couple of days, the patient has been having frequent spontaneous muscle twitching as well as urinary incontinence. The patient is alert with each episode. The patient has no prior history of seizure disorder and denies any history of DM. He does not have a PCP. Upon arrival, patient was lethargic, had an elevated Ammonia level, and a blood glucose of 552. He was started on an Insulin drip on admission. He was admitted for further work-up and treatment.      Interval history / Subjective:     Patient seen and examined daughter at bedside  Discussed with the patient that he will need long-acting insulin--not liking the idea and want to be on Ozempic and oral metformin discussed with diabetic management, not recommended for first-line treatment  Bacteremia from UTI and sepsis resolving--May switch to oral antibiotic if culture sensitivity to Levaquin     Assessment & Plan:        Acute metabolic encephalopathy:   - dehydration vs hepatic encephalopathy; -  - Resolved alert oriented times place and person  - no history of liver disease;   -Seen by hepatology need MRI of the abdomen and liver serologies and acute hepatitis panel  - Reduce lactulose    Sepsis, present on admission:  - source of infection: UTI with bacteremia;   - urine cx: Gram negative rods;   - blood cx: probable Klebsiella oxytoca growing in 4/4 bottles; repeat cultures  - c/s to Rocephin can be discharged on oral Levaquin    Type 2 diabetes with hyperosmolar nonketotic hyperglycemia:   - new diagnosis of DM;   - Appreciate DM team consult and recommendations;  - patient resistant to taking long-acting insulin with meal coverage he wished to continue as weekly shot of Ozempic and oral hypoglycemic not recommended for first-line treatment but if patient declines that could be option discussed with diabetic management and patient and daughter at bedside  - HgA1c >14;    Hyponatremia: Pseudohyponatremia now resolved    Elevated Creatinine:  - OKSANA vs CKD;   -- Resolved with IV fluid    Hypertension: Blood pressure elevated  - patient has taken Lisinopril in the past resume    Tremors:  - concerning for seizure activity;  - Neuro consulted;   - EEG negative;  -Requested MRI of brain which is negative    Urinary incontinence:    - most likely due to UTI;   - CT scan of the abdomen and pelvis --bilateral nonobstructing renal calculi will eventually need outpatient follow-up with urology    Hypokalemia replete potassium repeat labs    Code status: Full   Prophylaxis: [address] discussed with: patient  Anticipated Disposition: Home in 24 hours awaiting abdominal MRI       Hospital Problems  Date Reviewed: 1/17/2023            Codes Class Noted POA    * (Principal) Type 2 diabetes mellitus with hyperosmolar nonketotic hyperglycemia ([facility] [address] ICD-10-CM: E11.00  ICD-9-CM: 250.20  1/17/2023 Yes        Acute hepatic encephalopathy ICD-10-CM: K76.82  ICD-9-CM: 572.2  1/16/2023 Unknown         Review of Systems:   A comprehensive review of systems was negative except for that written in the HPI. Vital Signs:    Last 24hrs VS reviewed since prior progress note.  Most recent are:  Visit Vitals  BP (!) 150/85 (BP 1 Location: Left upper arm, BP Patient Position: At rest)   Pulse 95 Temp 97.9 °F (36.6 °C)   Resp 20   Ht 5' 11\" (1.803 m)   Wt 92.4 kg (203 lb 11.3 oz)   SpO2 100%   BMI 28.41 kg/m²         Intake/Output Summary (Last 24 hours) at 1/20/2023 1158  Last data filed at 1/20/2023 0815  Gross per 24 hour   Intake --   Output 1325 ml   Net -1325 ml          Physical Examination:     I had a face to face encounter with this patient and independently examined them on 1/20/2023 as outlined below:          Constitutional:  No acute distress, cooperative, pleasant    ENT:  Oral mucosa moist, oropharynx benign. Resp:  CTA bilaterally. No wheezing/rhonchi/rales. No accessory muscle use. CV:  Regular rhythm, normal rate, no murmurs, gallops, rubs    GI:  Soft, non distended, non tender. normoactive bowel sounds, no hepatosplenomegaly     Musculoskeletal:  No edema, warm, 2+ pulses throughout    Neurologic:  Moves all extremities. AAOx3, CN II-XII reviewed            Data Review:    Review and/or order of clinical lab test  Review and/or order of tests in the radiology section of CPT  Review and/or order of tests in the medicine section of CPT      Labs:     Recent Labs     01/20/23  0215 01/19/23  0455   WBC 9.0 10.9   HGB 11.4* 10.8*   HCT 35.4* 34.4*    184       Recent Labs     01/19/23  0455 01/19/23  0054 01/18/23  1926 01/18/23  1019 01/18/23  0132 01/17/23  1832 01/17/23  1426    144 140   < > 138   < > 136   K 3.3* 2.9* 3.4*   < > 3.8   < > 3.7   * 117* 112*   < > 110*   < > 103   CO2 20* 19* 19*   < > 20*   < > 26   BUN 12 13 16   < > 19   < > 22*   CREA 1.10 1.20 1.23   < > 1.17   < > 1.25   * 293* 214*   < > 121*   < > 235*   CA 8.0* 7.9* 8.2*   < > 8.1*   < > 9.1   MG 1.9 1.9 1.7   < > 2.0   < > 2.2   PHOS 3.0  --   --   --  3.1  --  2.7    < > = values in this interval not displayed. No results for input(s): ALT, AP, TBIL, TBILI, TP, ALB, GLOB, GGT, AML, LPSE in the last 72 hours.     No lab exists for component: SGOT, GPT, AMYP, HLPSE    No results for input(s): INR, PTP, APTT, INREXT, INREXT in the last 72 hours. No results for input(s): FE, TIBC, PSAT, FERR in the last 72 hours. Lab Results   Component Value Date/Time    Folate 9.0 01/16/2023 08:07 PM        No results for input(s): PH, PCO2, PO2 in the last 72 hours. No results for input(s): CPK, CKNDX, TROIQ in the last 72 hours.     No lab exists for component: CPKMB  Lab Results   Component Value Date/Time    Cholesterol, total 286 (H) 01/18/2023 01:32 AM    HDL Cholesterol 29 01/18/2023 01:32 AM    LDL,Direct 64 01/18/2023 01:32 AM    LDL, calculated Not calculated due to elevated triglyceride level 01/18/2023 01:32 AM    Triglyceride 756 (H) 01/20/2023 02:15 AM    CHOL/HDL Ratio 9.9 (H) 01/18/2023 01:32 AM     Lab Results   Component Value Date/Time    Glucose (POC) 195 (H) 01/20/2023 08:02 AM    Glucose (POC) 140 (H) 01/19/2023 10:40 PM    Glucose (POC) 192 (H) 01/19/2023 05:33 PM    Glucose (POC) 176 (H) 01/19/2023 11:56 AM    Glucose (POC) 109 01/19/2023 10:47 AM     Lab Results   Component Value Date/Time    Color YELLOW/STRAW 01/17/2023 07:15 AM    Appearance CLOUDY (A) 01/17/2023 07:15 AM    Specific gravity 1.010 01/17/2023 07:15 AM    pH (UA) 6.0 01/17/2023 07:15 AM    Protein TRACE (A) 01/17/2023 07:15 AM    Glucose >1,000 (A) 01/17/2023 07:15 AM    Ketone Negative 01/17/2023 07:15 AM    Bilirubin Negative 01/17/2023 07:15 AM    Urobilinogen 0.2 01/17/2023 07:15 AM    Nitrites Positive (A) 01/17/2023 07:15 AM    Leukocyte Esterase SMALL (A) 01/17/2023 07:15 AM    Epithelial cells FEW 01/17/2023 07:15 AM    Bacteria 1+ (A) 01/17/2023 07:15 AM    WBC 20-50 01/17/2023 07:15 AM    RBC 0-5 01/17/2023 07:15 AM         Medications Reviewed:     Current Facility-Administered Medications   Medication Dose Route Frequency    lactulose (CHRONULAC) 10 gram/15 mL solution 30 mL  30 mL Oral BID    insulin glargine (LANTUS) injection 30 Units  30 Units SubCUTAneous DAILY    dextrose 10 % infusion 0-250 mL  0-250 mL IntraVENous PRN    insulin lispro (HUMALOG) injection 5 Units  0.05 Units/kg SubCUTAneous TID WITH MEALS    insulin lispro (HUMALOG) injection   SubCUTAneous AC&HS    0.9% sodium chloride infusion  100 mL/hr IntraVENous CONTINUOUS    cefTRIAXone (ROCEPHIN) 2 g in 0.9% sodium chloride 20 mL IV syringe  2 g IntraVENous Q24H    fenofibrate (LOFIBRA) tablet 160 mg  160 mg Oral QHS    acetaminophen (TYLENOL) tablet 650 mg  650 mg Oral Q4H PRN    lisinopriL (PRINIVIL, ZESTRIL) tablet 40 mg  40 mg Oral DAILY    sodium chloride (NS) flush 5-40 mL  5-40 mL IntraVENous Q8H    sodium chloride (NS) flush 5-40 mL  5-40 mL IntraVENous PRN    polyethylene glycol (MIRALAX) packet 17 g  17 g Oral DAILY PRN    ondansetron (ZOFRAN ODT) tablet 4 mg  4 mg Oral Q8H PRN    Or    ondansetron (ZOFRAN) injection 4 mg  4 mg IntraVENous Q6H PRN    enoxaparin (LOVENOX) injection 40 mg  40 mg SubCUTAneous DAILY    glucose chewable tablet 16 g  4 Tablet Oral PRN    glucagon (GLUCAGEN) injection 1 mg  1 mg IntraMUSCular PRN     ______________________________________________________________________  EXPECTED LENGTH OF STAY: 4d 14h  ACTUAL LENGTH OF STAY:          4                 Keyshawn Baron MD

## 2023-01-21 LAB
GLUCOSE BLD STRIP.AUTO-MCNC: 140 MG/DL (ref 65–117)
GLUCOSE BLD STRIP.AUTO-MCNC: 145 MG/DL (ref 65–117)
GLUCOSE BLD STRIP.AUTO-MCNC: 189 MG/DL (ref 65–117)
GLUCOSE BLD STRIP.AUTO-MCNC: 206 MG/DL (ref 65–117)
SERVICE CMNT-IMP: ABNORMAL

## 2023-01-21 PROCEDURE — 74011250637 HC RX REV CODE- 250/637: Performed by: INTERNAL MEDICINE

## 2023-01-21 PROCEDURE — 82962 GLUCOSE BLOOD TEST: CPT

## 2023-01-21 PROCEDURE — 74011250636 HC RX REV CODE- 250/636: Performed by: INTERNAL MEDICINE

## 2023-01-21 PROCEDURE — 65660000001 HC RM ICU INTERMED STEPDOWN

## 2023-01-21 PROCEDURE — 94760 N-INVAS EAR/PLS OXIMETRY 1: CPT

## 2023-01-21 PROCEDURE — 74011636637 HC RX REV CODE- 636/637: Performed by: CLINICAL NURSE SPECIALIST

## 2023-01-21 RX ORDER — LEVOFLOXACIN 500 MG/1
500 TABLET, FILM COATED ORAL EVERY 24 HOURS
Status: DISCONTINUED | OUTPATIENT
Start: 2023-01-21 | End: 2023-01-22

## 2023-01-21 RX ORDER — GLIPIZIDE 5 MG/1
10 TABLET ORAL
Status: DISCONTINUED | OUTPATIENT
Start: 2023-01-21 | End: 2023-01-21

## 2023-01-21 RX ORDER — GLIPIZIDE 5 MG/1
20 TABLET ORAL
Status: DISCONTINUED | OUTPATIENT
Start: 2023-01-21 | End: 2023-01-22 | Stop reason: HOSPADM

## 2023-01-21 RX ADMIN — LACTULOSE 30 ML: 20 SOLUTION ORAL at 19:27

## 2023-01-21 RX ADMIN — ENOXAPARIN SODIUM 40 MG: 100 INJECTION SUBCUTANEOUS at 12:46

## 2023-01-21 RX ADMIN — GLIPIZIDE 20 MG: 5 TABLET ORAL at 19:27

## 2023-01-21 RX ADMIN — ACETAMINOPHEN 650 MG: 325 TABLET ORAL at 19:27

## 2023-01-21 RX ADMIN — FENOFIBRATE 160 MG: 160 TABLET ORAL at 22:51

## 2023-01-21 RX ADMIN — LACTULOSE 30 ML: 20 SOLUTION ORAL at 12:45

## 2023-01-21 RX ADMIN — Medication 4 UNITS: at 12:46

## 2023-01-21 RX ADMIN — LEVOFLOXACIN 500 MG: 500 TABLET, FILM COATED ORAL at 12:45

## 2023-01-21 RX ADMIN — ACETAMINOPHEN 650 MG: 325 TABLET ORAL at 23:25

## 2023-01-21 RX ADMIN — GLIPIZIDE 20 MG: 5 TABLET ORAL at 12:45

## 2023-01-21 RX ADMIN — Medication 3 UNITS: at 19:27

## 2023-01-21 RX ADMIN — LISINOPRIL 40 MG: 20 TABLET ORAL at 12:45

## 2023-01-21 NOTE — PROGRESS NOTES
[address] [facility] Adult  Hospitalist Group                                                                                          Hospitalist Progress Note  Robyn Fernando MD  Answering service: [phone] or [phone] from in house phone        Date of Service:  2023  NAME:  Katharina Buenrostro  :  1955  MRN:  [id number]      Admission Summary:   \"\"This is a 72-year-old man with past medical history significant for type 2 diabetes, chronic kidney disease, and hypertension; presented at the emergency room with muscle twitching and urinary incontinence. The patient's symptoms have been going on for a couple of months and progressively getting worse. In the last couple of days, the patient has been having frequent spontaneous muscle twitching as well as urinary incontinence. The patient is alert with each episode. The patient has no prior history of seizure disorder and denies any history of DM. He does not have a PCP. Upon arrival, patient was lethargic, had an elevated Ammonia level, and a blood glucose of 552. He was started on an Insulin drip on admission. He was admitted for further work-up and treatment. \"\"    Interval history / Subjective:   Seen earlier, he would rather not use insulin at all and therefore started on po meds with plans to trend during today to see if he could be discharged possibly tomorrow--he says he was arranged a new pcp but he has gone to [facility] before that also can provide interim primary care service. However still waiting on mri to be done.      Assessment & Plan:     Acute metabolic encephalopathy, resolved   - dehydration vs hepatic encephalopathy  -no known prior liver disease  -hepatology service appreciated  -mri abd pending to be done---question if this can be done as outpatient if he does not get this done by tomorrow along with outpatient followup with Dr Leatha Isaac    Sepsis, present on admission with uti and bacteremia with Hailey Beauchamp  -started on iv abx on admission  -transitioned to po abx based on sensitivity    Type 2 diabetes with hyperosmolar nonketotic hyperglycemia  -new diagnosis  -was started on insulin  -however, pt would prefer to avoid any injections  -trial of po diabetes meds while inpatient to see if this would be an option with outpatient followup  -he has used [facility] before and could continue in the interim until his first outpatient pcp appt    Pseudohyponatremia from hyperglycemia  resolved    Venkatesh thought to be prerenal, resolved     Hypertension hx  Asymptomatic  Monitor/trend, adjust as needed    Tremors reported by prior hospitalist that triggered neurology consult  -eeg unrevealing and report that mri brain was negative as well  -no tremors at bedside  -recommend outpatient followup with his pcp and pending followup whether if he needs referral to specialist    Urinary incontinence in setting of uti, able to control bowel movements  - CT scan of the abdomen and pelvis noted nonobstructing renal calculi will eventually need outpatient followup with urology    Code status: Full   Prophylaxis: [address] discussed with: patient  Anticipated Disposition: pending mri, pending accucheck trend       Hospital Problems  Date Reviewed: 1/17/2023            Codes Class Noted POA    * (Principal) Type 2 diabetes mellitus with hyperosmolar nonketotic hyperglycemia ([facility] [address] ICD-10-CM: E11.00  ICD-9-CM: 250.20  1/17/2023 Yes        Acute hepatic encephalopathy ICD-10-CM: F76.71  ICD-9-CM: 572.2  1/16/2023 Unknown       Review of Systems:   A comprehensive review of systems was negative except for that written in the HPI. Vital Signs:    Last 24hrs VS reviewed since prior progress note.  Most recent are:  Visit Vitals  /86   Pulse 85   Temp 98.6 °F (37 °C)   Resp 20   Ht 5' 11\" (1.803 m)   Wt 92.4 kg (203 lb 11.3 oz)   SpO2 98%   BMI 28.41 kg/m²         Physical Examination:     I had a face to face encounter with this patient and independently examined them on 1/21/2023 as outlined below:          Constitutional:  No acute distress, cooperative, pleasant    ENT:  Oral mucosa moist, oropharynx benign. Resp:  CTA bilaterally. No wheezing/rhonchi/rales. No accessory muscle use. CV:  Regular rhythm, normal rate         Musculoskeletal:  No edema, warm, 2+ pulses throughout    Neurologic:  Moves all extremities.   AAOx3             Data Review:    Review and/or order of clinical lab test  Review and/or order of tests in the radiology section of CPT  Review and/or order of tests in the medicine section of CPT      Labs:     Recent Labs     01/20/23  0215 01/19/23  0455   WBC 9.0 10.9   HGB 11.4* 10.8*   HCT 35.4* 34.4*    184       Recent Labs     01/19/23  0455 01/19/23  0054 01/18/23  1926    144 140   K 3.3* 2.9* 3.4*   * 117* 112*   CO2 20* 19* 19*   BUN 12 13 16   CREA 1.10 1.20 1.23   * 293* 214*   CA 8.0* 7.9* 8.2*   MG 1.9 1.9 1.7   PHOS 3.0  --   --          Lab Results   Component Value Date/Time    Glucose (POC) 206 (H) 01/21/2023 11:59 AM    Glucose (POC) 145 (H) 01/21/2023 08:02 AM    Glucose (POC) 133 (H) 01/20/2023 09:33 PM    Glucose (POC) 115 01/20/2023 05:51 PM    Glucose (POC) 197 (H) 01/20/2023 12:20 PM           Medications Reviewed:     Current Facility-Administered Medications   Medication Dose Route Frequency    levoFLOXacin (LEVAQUIN) tablet 500 mg  500 mg Oral Q24H    glipiZIDE (GLUCOTROL) tablet 20 mg  20 mg Oral ACB&D    lactulose (CHRONULAC) 10 gram/15 mL solution 30 mL  30 mL Oral BID    [Held by provider] insulin glargine (LANTUS) injection 30 Units  30 Units SubCUTAneous DAILY    dextrose 10 % infusion 0-250 mL  0-250 mL IntraVENous PRN    [Held by provider] insulin lispro (HUMALOG) injection 5 Units  0.05 Units/kg SubCUTAneous TID WITH MEALS    insulin lispro (HUMALOG) injection   SubCUTAneous AC&HS    0.9% sodium chloride infusion  100 mL/hr IntraVENous CONTINUOUS    fenofibrate (LOFIBRA) tablet 160 mg  160 mg Oral QHS    acetaminophen (TYLENOL) tablet 650 mg  650 mg Oral Q4H PRN    lisinopriL (PRINIVIL, ZESTRIL) tablet 40 mg  40 mg Oral DAILY    sodium chloride (NS) flush 5-40 mL  5-40 mL IntraVENous Q8H    sodium chloride (NS) flush 5-40 mL  5-40 mL IntraVENous PRN    polyethylene glycol (MIRALAX) packet 17 g  17 g Oral DAILY PRN    ondansetron (ZOFRAN ODT) tablet 4 mg  4 mg Oral Q8H PRN    Or    ondansetron (ZOFRAN) injection 4 mg  4 mg IntraVENous Q6H PRN    enoxaparin (LOVENOX) injection 40 mg  40 mg SubCUTAneous DAILY    glucose chewable tablet 16 g  4 Tablet Oral PRN    glucagon (GLUCAGEN) injection 1 mg  1 mg IntraMUSCular PRN     ______________________________________________________________________  EXPECTED LENGTH OF STAY: 5d 0h  ACTUAL LENGTH OF STAY:          5                 Cheyenne Yee MD

## 2023-01-21 NOTE — PROGRESS NOTES
Bedside and Verbal shift change report given to GABRIEL Robb (oncoming nurse) by Robert De Guzman LPN (offgoing nurse).  Report included the following information SBAR, Intake/Output, MAR, and Cardiac Rhythm SR .

## 2023-01-22 ENCOUNTER — APPOINTMENT (OUTPATIENT)
Dept: MRI IMAGING | Age: 68
DRG: 637 | End: 2023-01-22
Attending: INTERNAL MEDICINE
Payer: MEDICARE

## 2023-01-22 VITALS
HEART RATE: 88 BPM | WEIGHT: 227.07 LBS | SYSTOLIC BLOOD PRESSURE: 102 MMHG | OXYGEN SATURATION: 94 % | DIASTOLIC BLOOD PRESSURE: 74 MMHG | RESPIRATION RATE: 15 BRPM | HEIGHT: 71 IN | BODY MASS INDEX: 31.79 KG/M2 | TEMPERATURE: 98.3 F

## 2023-01-22 PROBLEM — K76.82 ACUTE HEPATIC ENCEPHALOPATHY (HCC): Status: RESOLVED | Noted: 2023-01-16 | Resolved: 2023-01-22

## 2023-01-22 PROBLEM — E11.00 TYPE 2 DIABETES MELLITUS WITH HYPEROSMOLAR NONKETOTIC HYPERGLYCEMIA (HCC): Status: RESOLVED | Noted: 2023-01-17 | Resolved: 2023-01-22

## 2023-01-22 LAB
GLUCOSE BLD STRIP.AUTO-MCNC: 147 MG/DL (ref 65–117)
GLUCOSE BLD STRIP.AUTO-MCNC: 159 MG/DL (ref 65–117)
SERVICE CMNT-IMP: ABNORMAL
SERVICE CMNT-IMP: ABNORMAL

## 2023-01-22 PROCEDURE — 74011250637 HC RX REV CODE- 250/637: Performed by: INTERNAL MEDICINE

## 2023-01-22 PROCEDURE — 82962 GLUCOSE BLOOD TEST: CPT

## 2023-01-22 PROCEDURE — 74011636637 HC RX REV CODE- 636/637: Performed by: CLINICAL NURSE SPECIALIST

## 2023-01-22 PROCEDURE — 74011250636 HC RX REV CODE- 250/636: Performed by: INTERNAL MEDICINE

## 2023-01-22 PROCEDURE — 99231 SBSQ HOSP IP/OBS SF/LOW 25: CPT | Performed by: INTERNAL MEDICINE

## 2023-01-22 PROCEDURE — 74011000250 HC RX REV CODE- 250: Performed by: INTERNAL MEDICINE

## 2023-01-22 RX ORDER — GLIPIZIDE 10 MG/1
TABLET ORAL
Qty: 120 TABLET | Refills: 0 | Status: SHIPPED | OUTPATIENT
Start: 2023-01-22 | End: 2023-01-22

## 2023-01-22 RX ORDER — LEVOFLOXACIN 750 MG/1
750 TABLET ORAL EVERY 24 HOURS
Qty: 8 TABLET | Refills: 0 | Status: SHIPPED | OUTPATIENT
Start: 2023-01-23 | End: 2023-01-31

## 2023-01-22 RX ORDER — LEVOFLOXACIN 500 MG/1
500 TABLET, FILM COATED ORAL EVERY 24 HOURS
Qty: 8 TABLET | Refills: 0 | Status: SHIPPED | OUTPATIENT
Start: 2023-01-23 | End: 2023-01-22

## 2023-01-22 RX ORDER — FENOFIBRATE 160 MG/1
TABLET ORAL
Qty: 30 TABLET | Refills: 0 | Status: SHIPPED | OUTPATIENT
Start: 2023-01-22 | End: 2023-01-22

## 2023-01-22 RX ADMIN — Medication 3 UNITS: at 13:51

## 2023-01-22 RX ADMIN — SODIUM CHLORIDE, PRESERVATIVE FREE 10 ML: 5 INJECTION INTRAVENOUS at 13:52

## 2023-01-22 RX ADMIN — GLIPIZIDE 20 MG: 5 TABLET ORAL at 08:49

## 2023-01-22 RX ADMIN — LISINOPRIL 40 MG: 20 TABLET ORAL at 08:49

## 2023-01-22 RX ADMIN — ENOXAPARIN SODIUM 40 MG: 100 INJECTION SUBCUTANEOUS at 08:49

## 2023-01-22 RX ADMIN — ACETAMINOPHEN 650 MG: 325 TABLET ORAL at 08:47

## 2023-01-22 RX ADMIN — LEVOFLOXACIN 500 MG: 500 TABLET, FILM COATED ORAL at 08:49

## 2023-01-22 RX ADMIN — Medication 3 UNITS: at 08:48

## 2023-01-22 NOTE — PROGRESS NOTES
Antibiotic Dosing/Monitoring  Medication: levofloxacin   Current regimen:  500 mg PO every 24 hr    No results for input(s): CREA, BUN in the last 72 hours. Estimated CrCl:  80 ml/min    Plan: Change to 750 mg PO Q24H for indication: bacteremia. Dr. Jenni Alfaro was notified of the need to adjust the outpatient prescription.

## 2023-01-22 NOTE — PROGRESS NOTES
Pt seen at bedside, he states no one has told him yet that he is getting mria today or not---he wants to be discharged today. He states he would be willing to see Dr. Yonas Garibay as outpatient. He wants to leave today---he has been informed of plans for discharge late afternoon today after mra--but if the image is delayed, he has agreed to still see Dr. Yonas Garibay as outpatient with likely outpatient image if it does not get done today. Plan to provide printed scripts and place onto chart for the staff to give upon discharge later this afternoon.

## 2023-01-22 NOTE — PROGRESS NOTES
[address] [facility]\"", MD, FACP, [address], [place]      NICOLE Murray, [facility]   Jeniffer Knife, [facility]   ANNABEL Flood, ANNABEL Tran, [facility]      [address]   at [address], [address], [place]  [address].   [phone]   FAX: [phone]  [facility] [place] [facility]   at [address], [address] [place] [address], [address]   [phone]   FAX: [phone]       HEPATOLOGY PROGRESS NOTE  The patient is a 79 y.o. male without history of chronic liver dsiease. He developed muscle twitching over several days then confusion and he was brought to the ED by his family. In the ED Laboratory studies were significant for:    WBC 7.8, HB 13.9 gms, , Sna 125, Scr 1.7 mg, AST 24, ALT 33, ALP 27, TBILI 0.5 mg, ALB 3.0 gm, Sammonia 110,     Imaging of the liver with non-contrast CT scan demonstrated a normal appearing liver. NO liver mass. No ascites. Hospital Course to date:  He has been treated with lactulose and ammonia has come down. He is now alert and oriented. MRI brain suggests chronic small vessel ischemia    Hepatology Plan:  MRI unable to be performed last night or this AM.  Please DC today  I will arrange for MRI as OP and see him in my office 2 weeks after that. DC on lactulose 30 cc BID-every day as needed. No xifaxan needed at DC.      ASSESSMENT AND PLAN:  Elevated serum ammonia  This was markedly elevated when he arrived in ED and he was somewhat confused and slow in mentation per ED notes. With lactulose serum ammonia is down to the 50s. There is now no confusion    Most common cause of elevated ammonia is cirrhosis. But there is no apparent cirrhosis by imaging or labs.   He does have features of metabolic syndrome and could have NAFLD  This will be assess by Fibroscan in the office after hospital discharge    Second most common cause is congential shunting allowing mesenteric blood to bypass liver. These shunts can be very small and asymptomatic for many decades but then start to slowly enlarge. If present these shunts can be occluded. Reduce lactulose BID     PHYSICAL EXAMINATION:  VS: per nursing note  General:  No acute distress. Eyes:  Sclera anicteric. ENT:  No oral lesions. Thyroid normal.  Nodes:  No adenopathy. Skin:  No spider angiomata. No jaundice. Respiratory:  Lungs clear to auscultation. Cardiovascular:  Regular heart rate. Abdomen:  Soft non-tender, No obvious ascites. Extremities:  No lower extremity edema. Neurologic:  Alert and oriented. Cranial nerves grossly intact. No asterixis.     LABORATORY:   Latest Reference Range & Units 1/19/23 04:55 1/20/23 02:15   WBC 4.1 - 11.1 K/uL 10.9 9.0   HGB 12.1 - 17.0 g/dL 10.8 (L) 11.4 (L)   PLATELET 504 - 403 K/uL 184 209   Sodium 136 - 145 mmol/L 144    Potassium 3.5 - 5.1 mmol/L 3.3 (L)    Chloride 97 - 108 mmol/L 115 (H)    CO2 21 - 32 mmol/L 20 (L)    Glucose 65 - 100 mg/dL 190 (H)    BUN 6 - 20 MG/DL 12    Creatinine 0.70 - 1.30 MG/DL 1.10    (L): Data is abnormally low  (H): Data is abnormally high      MD Dolly Gibson [address] [facility] of [address] [address] [address] [facility] Hospital[facility] City[facility]  [address].  [phone]  FAX:  [address] [place]

## 2023-01-22 NOTE — PROGRESS NOTES
Problem: Diabetes Self-Management  Goal: *Disease process and treatment process  Description: Define diabetes and identify own type of diabetes; list 3 options for treating diabetes. Outcome: Progressing Towards Goal  Goal: *Incorporating nutritional management into lifestyle  Description: Describe effect of type, amount and timing of food on blood glucose; list 3 methods for planning meals. Outcome: Progressing Towards Goal  Goal: *Incorporating physical activity into lifestyle  Description: State effect of exercise on blood glucose levels. Outcome: Progressing Towards Goal  Goal: *Developing strategies to promote health/change behavior  Description: Define the ABC's of diabetes; identify appropriate screenings, schedule and personal plan for screenings. Outcome: Progressing Towards Goal  Goal: *Using medications safely  Description: State effect of diabetes medications on diabetes; name diabetes medication taking, action and side effects. Outcome: Progressing Towards Goal  Goal: *Monitoring blood glucose, interpreting and using results  Description: Identify recommended blood glucose targets  and personal targets. Outcome: Progressing Towards Goal  Goal: *Prevention, detection, treatment of acute complications  Description: List symptoms of hyper- and hypoglycemia; describe how to treat low blood sugar and actions for lowering  high blood glucose level. Outcome: Progressing Towards Goal  Goal: *Prevention, detection and treatment of chronic complications  Description: Define the natural course of diabetes and describe the relationship of blood glucose levels to long term complications of diabetes.   Outcome: Progressing Towards Goal  Goal: *Developing strategies to address psychosocial issues  Description: Describe feelings about living with diabetes; identify support needed and support network  Outcome: Progressing Towards Goal  Goal: *Insulin pump training  Outcome: Progressing Towards Goal  Goal: *Sick day guidelines  Outcome: Progressing Towards Goal  Goal: *Patient Specific Goal (EDIT GOAL, INSERT TEXT)  Outcome: Progressing Towards Goal     Problem: Patient Education: Go to Patient Education Activity  Goal: Patient/Family Education  Outcome: Progressing Towards Goal     Problem: Falls - Risk of  Goal: *Absence of Falls  Description: Document Adriana Ordonez Fall Risk and appropriate interventions in the flowsheet.   Outcome: Progressing Towards Goal  Note: Fall Risk Interventions:  Mobility Interventions: Bed/chair exit alarm    Mentation Interventions: Adequate sleep, hydration, pain control, Bed/chair exit alarm, Door open when patient unattended, Eyeglasses and hearing aids    Medication Interventions: Evaluate medications/consider consulting pharmacy, Patient to call before getting OOB, Teach patient to arise slowly                   Problem: Patient Education: Go to Patient Education Activity  Goal: Patient/Family Education  Outcome: Progressing Towards Goal

## 2023-01-22 NOTE — PROGRESS NOTES
Pt ALICIA for repeat MRI    MRI called RN stated they're sending patient back w/o MRI. Pt needs new IV to get MRI and is refusing any further sticks. MD made aware.

## 2023-01-22 NOTE — PROGRESS NOTES
2100: Patient refused blood draw for AM labs and insertion of new IV. Pt currently has no IV access. Bedside and Verbal shift change report given to GABRIEL Alvarez (oncoming nurse) by Juancarlos Cowan LPN (offgoing nurse). Report included the following information SBAR, Kardex, MAR, Recent Results, Cardiac Rhythm SR, and Quality Measures.

## 2023-01-22 NOTE — DISCHARGE SUMMARY
Discharge Summary       PATIENT ID: Nav Batista  MRN: [id number]   YOB: 1955    DATE OF ADMISSION: 1/16/2023 10:39 PM    DATE OF DISCHARGE: 1/22/2023   PRIMARY CARE PROVIDER: None     ATTENDING PHYSICIAN: Dr Ramu Sosa  DISCHARGING PROVIDER: Chente Carroll MD      CONSULTATIONS: IP CONSULT TO NEUROLOGY  IP CONSULT TO HEPATOLOGY    PROCEDURES/SURGERIES: * No surgery found *    ADMISSION SUMMARY AND HOSPITAL COURSE:     DISCHARGE DIAGNOSES / PLAN:      Admission Summary:   \"\"This is a 62-year-old man with past medical history significant for type 2 diabetes, chronic kidney disease, and hypertension; presented at the emergency room with muscle twitching and urinary incontinence. The patient's symptoms have been going on for a couple of months and progressively getting worse. In the last couple of days, the patient has been having frequent spontaneous muscle twitching as well as urinary incontinence. The patient is alert with each episode. The patient has no prior history of seizure disorder and denies any history of DM. He does not have a PCP. Upon arrival, patient was lethargic, had an elevated Ammonia level, and a blood glucose of 552. He was started on an Insulin drip on admission. He was admitted for further work-up and treatment. \"\"     Interval history / Subjective:   Seen earlier, he would rather not use insulin at all and therefore started on po meds with plans to trend during today to see if he could be discharged possibly tomorrow--he says he was arranged a new pcp but he has gone to [facility] before that also can provide interim primary care service. However still waiting on mri to be done.       Assessment & Plan:      Acute metabolic encephalopathy, resolved   - dehydration vs hepatic encephalopathy  -no known prior liver disease  -hepatology service appreciated  -mri abd ordered but patient declined for iv access and wanted to be discharged 1/23 and he agreed to see Dr. Ryan Cameron as outpatient for this future image and liver workup    Report by prior hospitalist of sepsis, present on admission with uti and bacteremia with Fabricio Merchanter  -started on iv abx on admission  -transitioned to po abx based on sensitivity  -needs followup with outpatient provider     Type 2 diabetes with hyperosmolar nonketotic hyperglycemia  -new diagnosis  -was started on insulin  -however, pt would prefer to avoid any injections whatsoever and therefore was changed to po med 1/21 with accuchecks with relatively good control   -pt refused lab draws/check 1/22  -he has used [facility] before and could continue in the interim until his first outpatient pcp appt     Pseudohyponatremia from hyperglycemia  resolved     Venkatesh thought to be prerenal, resolved      Hypertension hx  Asymptomatic  -needs followup with outpatient provider     Tremors reported by prior hospitalist that triggered neurology consult  -eeg unrevealing and report that mri brain was negative as well  -no tremors at bedside  -recommend outpatient followup with his pcp and pending followup whether if he needs referral to specialist     Urinary incontinence in setting of uti, able to control bowel movements  - CT scan of the abdomen and pelvis noted nonobstructing renal calculi will eventually need outpatient followup with urology  -needs followup with outpatient provider           ADDITIONAL CARE RECOMMENDATIONS:     DIET: Diabetic Diet    ACTIVITY: Activity as tolerated      NOTIFY YOUR PHYSICIAN FOR ANY OF THE FOLLOWING:   Fever over 101 degrees for 24 hours. Chest pain, shortness of breath, fever, chills, nausea, vomiting, diarrhea, change in mentation, falling, weakness, bleeding. Severe pain or pain not relieved by medications, as well as any other signs or symptoms that you may have questions about.     FOLLOW UP APPOINTMENTS:    Follow-up Information       Follow up With Specialties Details Why Contact Chata Gutierrez MD Internal Medicine Physician Follow up Hospital follow up with NEW PCP scheduled from Thursday, March, 24, 2023 at 9:30a.m. [address]  [phone]      None    None (384) Patient stated that they have no PCP      [facility]  Go in 3 day(s) Go to [facility] for labs, hospital discharge followup, blood sugar check until you are able to see your primary care doctor. Smith Santa MD Hepatology Call Call to schedule appointment with  for followup of the liver and future imaging [address] [place] [address] Place  [phone]               DISCHARGE MEDICATIONS:  Current Discharge Medication List        START taking these medications    Details   fenofibrate (LOFIBRA) 160 mg tablet Take one by mouth daily. One-time fill from hospital. Followup with pcp for future fills. Qty: 30 Tablet, Refills: 0  Start date: 1/22/2023      glipiZIDE (GLUCOTROL) 10 mg tablet Take two tablets by mouth twice daily. One-time fill from hospital. Recommend going to [facility] within 5 days of hospital discharge for repeat labs (comprehensive panel) and blood sugar check until you see your planned primary care doctor appointment  Qty: 120 Tablet, Refills: 0  Start date: 1/22/2023      levoFLOXacin (LEVAQUIN) 750 mg tablet Take 1 Tablet by mouth every twenty-four (24) hours for 8 doses. Qty: 8 Tablet, Refills: 0  Start date: 1/23/2023, End date: 1/31/2023           CONTINUE these medications which have NOT CHANGED    Details   lisinopril (PRINIVIL, ZESTRIL) 40 mg tablet Take 40 mg by mouth daily.              STOP taking these medications       febuxostat (Uloric) 80 mg tab tablet Comments:   Reason for Stopping:         OTHER,NON-FORMULARY, Comments:   Reason for Stopping:               DISPOSITION:    Home With:   OT  PT  HH  RN       Long term SNF/Inpatient Rehab   x Independent/assisted living    Hospice    Other:     PATIENT CONDITION AT DISCHARGE: Functional status    Poor     Deconditioned    x Independent      Cognition    x Lucid     Forgetful     Dementia      Catheters/lines (plus indication)    Escobar     PICC     PEG    x None      Code status   x  Full code     DNR      PHYSICAL EXAMINATION AT DISCHARGE:    General:          Alert, cooperative, no distress, appears stated age. HEENT:           Atraumatic, anicteric sclerae  Lungs:             Clear to auscultation bilaterally. No Wheezing or Rhonchi. No rales. Chest wall:      No tenderness  No Accessory muscle use. Heart:              Regular  rhythm  Abdomen:        Soft, non-tender. Not distended. Bowel sounds normal  Extremities:     No cyanosis.   Skin:                Not Jaundiced   Neurologic:      Alert, moves all extremities, answers questions appropriately and responds to commands            Greater than 35 minutes were spent with the patient on counseling and coordination of care    Signed:   Rosalind Ramirez MD  1/22/2023  12:09 PM

## 2023-01-25 ENCOUNTER — DOCUMENTATION ONLY (OUTPATIENT)
Dept: HEMATOLOGY | Age: 68
End: 2023-01-25

## 2023-01-25 DIAGNOSIS — K76.82 HEPATIC ENCEPHALOPATHY: Primary | ICD-10-CM

## 2023-02-01 ENCOUNTER — HOSPITAL ENCOUNTER (OUTPATIENT)
Dept: MRI IMAGING | Age: 68
Discharge: HOME | End: 2023-02-01
Attending: INTERNAL MEDICINE
Payer: MEDICARE

## 2023-02-01 DIAGNOSIS — K76.82 HEPATIC ENCEPHALOPATHY: ICD-10-CM

## 2023-02-01 PROCEDURE — 74011000258 HC RX REV CODE- 258: Performed by: INTERNAL MEDICINE

## 2023-02-01 PROCEDURE — 74011000250 HC RX REV CODE- 250: Performed by: INTERNAL MEDICINE

## 2023-02-01 PROCEDURE — A9576 INJ PROHANCE MULTIPACK: HCPCS

## 2023-02-01 PROCEDURE — 74011250636 HC RX REV CODE- 250/636

## 2023-02-01 PROCEDURE — 74183 MRI ABD W/O CNTR FLWD CNTR: CPT

## 2023-02-01 RX ORDER — SODIUM CHLORIDE 0.9 % (FLUSH) 0.9 %
10 SYRINGE (ML) INJECTION
Status: COMPLETED | OUTPATIENT
Start: 2023-02-01 | End: 2023-02-01

## 2023-02-01 RX ADMIN — SODIUM CHLORIDE 100 ML: 900 INJECTION, SOLUTION INTRAVENOUS at 19:28

## 2023-02-01 RX ADMIN — SODIUM CHLORIDE, PRESERVATIVE FREE 10 ML: 5 INJECTION INTRAVENOUS at 19:28

## 2023-02-01 RX ADMIN — GADOTERIDOL 20 ML: 279.3 INJECTION, SOLUTION INTRAVENOUS at 19:27

## 2023-02-24 ENCOUNTER — CLINICAL SUPPORT (OUTPATIENT)
Dept: DIABETES SERVICES | Age: 68
End: 2023-02-24
Payer: MEDICARE

## 2023-02-24 DIAGNOSIS — E11.65 TYPE 2 DIABETES MELLITUS WITH HYPERGLYCEMIA, WITHOUT LONG-TERM CURRENT USE OF INSULIN (HCC): ICD-10-CM

## 2023-02-24 NOTE — PROGRESS NOTES
[facility] Program for Diabetes Health  Diabetes Self-Management Education & Support Program    Reason for Referral: New dx   Referral Source: Debi Yung, CNS  Services requested: DSMES       ASSESSMENT    From my perspective, the participant would benefit from [facility] specifically related to reducing risks, healthy eating, monitoring, taking medications, physical activity, healthy coping, and problem solving. Will adapt DSMES program to build on participant's skills score, strengths in confidence score, and strengths in preparedness score as noted in the Diabetes Skills, Confidence, and Preparedness Index. During the program, we will focus on providing DSMES that specifically addresses participant's interest in reducing risks, healthy eating, monitoring, taking medications, physical activity, healthy coping, and problem solving, as shown by their reported readiness to change. The participant would be best served by attending weekly individual sessions. Diabetes Self-Management Education Follow-up Visit: 3/7/23. Clinical Presentation  Ny Ruggiero is a 79 y.o. White male referred for diabetes self-management education. Participant has Type 2 DM not on insulin for <1 year. Family history positivefor diabetes. Patient reports not receiving DSMES services in the past. Most recent A1c value:   Lab Results   Component Value Date/Time    Hemoglobin A1c >14.0 (H) 01/16/2023 08:07 PM       Diabetes-related medical history:    Neurological complications  peripheral neuropathy  Microvascular disease  nephropathy      Diabetes-related medications:  Current dosing:   Key Antihyperglycemic Medications               glipiZIDE (GLUCOTROL) 10 mg tablet Take 2 Tablets by mouth two (2) times a day. TAKE 2 TABLETS BY MOUTH TWICE DAILY. One time fill from hospital. Future fills from pcp/outpatient provider who will need to repeat labwork and check blood sugar.             Blood Pressure Management  Key ACE/ARB Medications               lisinopril (PRINIVIL, ZESTRIL) 40 mg tablet Take 40 mg by mouth daily. Lipid Management  Key Antihyperlipidemia Meds               fenofibrate (LOFIBRA) 160 mg tablet TAKE 1 TABLET BY MOUTH EVERY DAY. One time fill from hospital. Future fills and followups by patient's outpatient provider. Clot Prevention  Key Anti-Platelet Anticoagulant Meds       The patient is on no antiplatelet meds or anticoagulants. Learning Assessment  Learning objectives Educator assessment (2/24/2023)   Diabetes Disease Process  The participant can   A) describe diabetes in basic terms;   B) state the type of diabetes they have; &   C) state accepted blood glucose targets. Healthy Eating  The participant can   A) identify carbohydrate foods; &   B) accurately read food labels. Being Active  The participant can  A) state the benefits of physical activity;  B) report their current PA practices;  C) identify PA they would consider incorporating in their lives; &  D) develop an implementation plan. Monitoring  The participant can  A) operate their blood glucose meter; &  B) describe how they log their blood glucoses to share with their provider. Taking Medications  The participant can  A) name their diabetes medications;  B) state the purpose and dose;  C) note side effects; &  D) describe proper storage, disposal & transport (if appropriate). Healthy Coping  The participant can    A) describe their response to diabetes diagnosis; B) describe their specific coping mechanisms;  C) identify supportive people and/or other resources that positively support their diabetes self-care and health. Reducing Risks  The participant can describe the preventive measures used by providers to promote health and prevent diabetes complications.      Problem Solving  The participant can   A) identify signs, symptoms & treatment of hypoglycemia;    B) identify signs, symptoms & treatment of hyperglycemia;  C) describe their sick day plan; &  D) identify BG patterns to discuss with their provider. Yes  Yes  No        No  No        Yes  Yes  Yes  Yes        Yes  No        Yes  Yes  Yes  No        Yes  Yes  Yes        No          No  No  No  No     Characteristics to Learning   Barriers to Learning      None     Favorite Ways to Learn   [] Lecture  [] Slides  [] Reading [] Video-Internet  [] Cassettes/CDs/MP3's  [] Interactive Small Groups [] Other       Behavioral Assessment  Current self-care practices  Educator assessment (2/24/2023)   Healthy Eating   Current practices    24-hour Dietary Recall:  Breakfast: Itz Armida; had eggs, guerra, hash browns, un-sweet tea and orange juice   Lunch: cornflake cereal with 2% milk and sometimes berries on top   Dinner: steak and a cup of chili  Snacks: occasionally will have cheez its or fruit   Beverages: water, unsweet tea, fruit juice occasionally   Alcohol: none       Would benefit from DSMES related to Healthy Eating: Yes    Eats a carbohydrate controlled diet: No    Stage of change: Preparation      Being Active  Current practices  How many days during the past week have you performed physical activity where your heart beats faster and your breathing is harder than normal for 30 minutes or more? 4 day(s)    How many days in a typical week do you perform activity such as this?  5 day(s)     Would benefit from [facility] related to Being Active: Yes      Exercises 150 minutes/week: Yes      Stage of change: Action     Monitoring  Current practices  Do you monitor your blood sugar? Yes    How often do you monitor? 2x/day    What are the range of readings?  mg/dL    Do you know your last A1c measurement? No    Do you know the meaning of the A1c?  No     Would benefit from [facility] related to Monitoring: Yes      Uses BG readings to establish trends and understand BG patterns: No      Stage of change: Preparation       Taking Medication  Current practices  Do you understand what your diabetes medications do? Yes    How often do you miss doses of your diabetes medications? never    Can you afford your diabetes medications? Yes   Would benefit from [facility] related to Taking Medication: Yes      Takes medications consistently to receive full benefit: Yes      Stage of change: Action       Healthy Coping   Current state  Diabetes Skills, Confidence and Preparedness Index: Total score: 5.9  Skills: 5.8  Confidence: 6.0  Preparedness: 6.0       Would benefit from DSMES related to Healthy Coping: Yes      Identifies specific people, organizations,etc, that actively support their diabetes self-care efforts: Yes - wife      Stage of change: Action     Reducing Risks  Current state  Vaccines:  Influenza: not received     Pneumococcal: not received     Hepatitis: not received     Examinations:  Diabetic Foot and Eye Exam HM Status   Topic Date Due    Diabetic Foot Care  01/16/2024        Dental exam:  upcoming appointment     Foot exam: due    Heart Protection:  BP Readings from Last 2 Encounters:   01/22/23 102/74   01/16/21 107/73        Lab Results   Component Value Date/Time    LDL,Direct 64 01/18/2023 01:32 AM    LDL, calculated Not calculated due to elevated triglyceride level 01/18/2023 01:32 AM        Kidney Protection:  No results found for: MCACR, MCA1, MCA2, MCA3, MCAU, MCAU2, MCALPOCT     Would benefit from [facility] related to Reducing Risks:  Yes      Actively participates in decision-making with provider regarding secondary prevention:  No      Stage of change: Preparation   Problem Solving  Current state  Hypoglycemia Management:  What are signs and symptoms of hypoglycemia that you experience: Weakness    How do you prevent hypoglycemia: consistent meals/snack time    How do you treat hypoglycemia: Patient is unaware of how to treat hypoglycemia but reported drink orange juice    Hyperglycemia Management:  What are signs and symptoms of hyperglycemia that you experience: Extreme thirst, Frequent urination, Fatigue    How can you prevent hyperglycemia: patient is unaware of how to prevent high blood sugars    Sick Day Management:  What do you do differently on sick days:  Pt reported being unaware of self-management on sick days    Pattern Management:  Do you notice blood glucose patterns when you look at the readings in your meter or logbook? No    How do you use the blood glucose readings from your meter or logbook? Pt is unaware of how to use BG readings from meter/logbook     Would benefit from [facility] related to Problem Solving: Yes      Articulates appropriate strategies to address hypoglycemia, hyperglycemia, sick day care and BG pattern: No      Stage of change: Preparation       Note: Content derived from the American Association of Diabetes Educators' Diabetes Education Curriculum: A Guide to Successful Self-Management (3rd edition)      Chantale Ortiz RD on 2/24/2023 at 9:14 AM    I have personally reviewed the health record, including provider notes, laboratory data and current medications before making these care and education recommendations. The time spent in this effort is included in the total time.   Total minutes: 30      Overall SCPI score: 5.9 Skills Score: 5.8  Low: Healthy Eating(Q1),Healthy Coping(Q7) Confidence Score: 6.0  Low: Healthy Eating(Q1),Healthy Coping(Q2),Reducing Risks(Q3),Healthy Eating(Q4),Being Active(Q5),Blood Sugar Monitoring(Q6),Problem FPEQRNL(B6) Preparedness Score: 6.0  Low: Healthy Eating(Q1),Being Active(Q2),Healthy Coping(Q3),Reducing Risks(Q4),Blood Sugar Monitoring(Q6),Problem Solving(Q7)  Healthy Eating Score: 5.8  Low: Skills(Q1) Taking Medication Score: 6.0  Low: Skills(Q2) Blood Sugar Monitoring Score: 6.0  Low: Skills(Q3),Skills(Q4),Skills(Q8),Confidence(Q6),Preparedness(Q6) Reducing Risks Score: 6.0  Low: Skills(Q5),Skills(Q9),Confidence(Q3),Preparedness(Q4)  Problem Solving Score: 6.0  Low: Skills(Q6),Confidence(Q7),Preparedness(Q7) Healthy Coping Score: 5.7  Low: DSWRNR(C3) Being Active Score: 6.0  Low: Confidence(Q5),Preparedness(Q2)    Skills/Knowledge Questions  1. I know how to plan meals that have the best balance between carbohydrates, proteins and vegetables. 5  2. I know how my diabetes medications (pills, injectables and/or insulin) work in my body. 6  3. I know when to check my blood sugar if I want to see how my body responded to a meal. 6  4. I know when to check my blood sugars to determine if my medication or insulin doses are correct. 6  5. I know what to do to prevent a low blood sugar when I exercise (either before, during, or after). 6  6. When I am sick, I know what to do differently with my diabetes management. 6  7. I know how stress can affect my diabetes management. 5  8. When I look at my blood sugars over a given week, I can explain what my blood sugar pattern is. 6  9. I know what my target levels are for A1c, blood pressure and cholesterol. 6  Confidence Questions  1. I am confident that I can plan balanced meals and snacks. 6  2. I am confident that I can manage my stress. 6  3. I am confident that I can prevent a low blood sugar during or after exercise. 6  4. I am confident that the next time I eat out, I will be able to choose foods that best keep my blood sugars in target. 6  5. I am confident I can include exercise into my schedule. 6  6. I am confident that I can use my daily blood sugars to adjust my diet, my activity, and/or my insulin. 6  7. When something out of my normal routine happens, I am confident that I can problem-solve and keep my diabetes on track. 6  Preparedness Questions  1. Within the next month, I will begin to eat more balanced meals and snacks. 6  2. Within the next month, I will choose an exercise activity and I will start fitting it into my schedule. 6  3.  Within the next month, I will make a list of stress management options that work for me. 6  4. Within the next month, I will consistently plan ahead to prevent low blood sugars. 6  5. Within the next month, I will start adjusting my insulin doses on my own. 0  6. Within the next month, I will begin making changes to my diabetes management based on my daily blood sugars (eg - eating, activity and/or insulin). 6  7. Within the next month, I will begin making changes to my diabetes management to meet my overall goals (eg - eating, activity and/or insulin).  6

## 2023-03-01 ENCOUNTER — OFFICE VISIT (OUTPATIENT)
Dept: HEMATOLOGY | Age: 68
End: 2023-03-01
Payer: MEDICARE

## 2023-03-01 VITALS
WEIGHT: 199 LBS | SYSTOLIC BLOOD PRESSURE: 121 MMHG | BODY MASS INDEX: 27.86 KG/M2 | HEIGHT: 71 IN | TEMPERATURE: 97 F | DIASTOLIC BLOOD PRESSURE: 77 MMHG | HEART RATE: 99 BPM | OXYGEN SATURATION: 99 %

## 2023-03-01 DIAGNOSIS — E78.00 HYPERCHOLESTEROLEMIA: ICD-10-CM

## 2023-03-01 DIAGNOSIS — E72.20 SERUM AMMONIA INCREASED (HCC): ICD-10-CM

## 2023-03-01 DIAGNOSIS — K76.9 LIVER DISEASE: Primary | ICD-10-CM

## 2023-03-01 PROCEDURE — G0463 HOSPITAL OUTPT CLINIC VISIT: HCPCS | Performed by: INTERNAL MEDICINE

## 2023-03-01 PROCEDURE — 91200 LIVER ELASTOGRAPHY: CPT | Performed by: INTERNAL MEDICINE

## 2023-03-01 RX ORDER — DULAGLUTIDE 1.5 MG/.5ML
INJECTION, SOLUTION SUBCUTANEOUS
COMMUNITY
Start: 2023-01-24 | End: 2023-03-24 | Stop reason: SDUPTHER

## 2023-03-01 NOTE — PROGRESS NOTES
Identified pt with two pt identifiers(name and ). Reviewed record in preparation for visit and have obtained necessary documentation. Chief Complaint   Patient presents with    Follow-up      Vitals:    23 1448   BP: 121/77   Pulse: 99   Temp: 97 °F (36.1 °C)   TempSrc: Temporal   SpO2: 99%   Weight: 199 lb (90.3 kg)   Height: 5' 11\" (1.803 m)   PainSc:   0 - No pain       Health Maintenance Review: Patient reminded of \"due or due soon\" health maintenance. I have asked the patient to contact his/her primary care provider (PCP) for follow-up on his/her health maintenance. Coordination of Care Questionnaire:  :   1) Have you been to an emergency room, urgent care, or hospitalized since your last visit? If yes, where when, and reason for visit? Yes here at [facility] for hyperglycemia and UTI    2. Have seen or consulted any other health care provider since your last visit? If yes, where when, and reason for visit? NO      Patient is accompanied by self I have received verbal consent from Veronica Licona to discuss any/all medical information while they are present in the room.

## 2023-03-01 NOTE — PROGRESS NOTES
FS.  250, 6.6, 10%    MRI normal.  Mnimal steatosi normal appearing liver. No ascites. He was treated with lactulose, ammonia came down and confusion resolved. He remains on lactulose BID    The most recent imaging of the liver was MRI performed in 2/2023. Results suggest fatty liver disease. There is no evidence for cirrhosis    Assessment of liver fibrosis with Fibroscan was performed in the office today. The result was 6.6 kPa which correlates with stage 1 fibrosis. The CAP score of 250 suggests there may be hepatic steatosis. In the office today the patient has the following symptoms:  The patient feels well and has no complaints. The patient is not experiencing the following symptoms which are commonly seen with this liver disorder:   fatigue,   pain in the right side over the liver,   diarrhea,     The patient completes all daily activities without any functional limitations. ASSESSMENT AND PLAN:  Elevated serum ammonia  This was markedly elevated when he arrived in ED and he was somewhat confused and slow in mentation per ED notes. With lactulose serum ammonia is down to the 50s. There is now no confusion     He does not have cirrhosis by labs, imaging or Fibroscan. He does have features of metabolic syndrome and appears to have fatty liver. MRI shows he does not have mesenteric-systemic shunting. He may have a defect in ammonia metabolism and has chronic elevated ammonia for his entire life. The treatment for this carolyn be lactulose. Fatty liver  NAFLD  Suspect the patient has fatty liver based upon imaging, features of metabolic syndrome,     A liver biopsy has not been performed. Fibroscan in 3/2023 demonstrated  6.6 kPa and  suggesting stage 1 fibrosis. CAP score is borderline for fatty liver. Have performed laboratory testing to monitor liver function and degree of liver injury. This included BMP, hepatic panel, CBC with platelet count, INR.     Liver transaminases are normal.  ALP is normal.  Liver function is normal.  The platelet count is normal.      Based upon laboratory studies Fibroscan, and imaging  the patient does not have advanced liver disease. Screening for Hepatocellular Carcinoma  HCC screening is not necessary if the patient has no evidence of cirrhosis. Treatment of other medical problems in patients with chronic liver disease  There are no contraindications for the patient to take most medications that are necessary for treatment of other medical issues. Counseling for alcohol in patients with chronic liver disease  The patient does not consume any significant amount of alcohol. Vaccinations   Since the patient does not have a chronic liver disease which can lead to liver injury screening for HAV and HBV is not needed. Routine vaccinations against other bacterial and viral agents can be performed as indicated. Annual flu vaccination should be administered if indicated. ALLERGIES  No Known Allergies    MEDICATIONS  Current Outpatient Medications   Medication Sig    dulaglutide (Trulicity) 1.5 OB/2.6 mL sub-q pen     glipiZIDE (GLUCOTROL) 10 mg tablet Take 2 Tablets by mouth two (2) times a day. TAKE 2 TABLETS BY MOUTH TWICE DAILY. One time fill from hospital. Future fills from pcp/outpatient provider who will need to repeat labwork and check blood sugar. fenofibrate (LOFIBRA) 160 mg tablet TAKE 1 TABLET BY MOUTH EVERY DAY. One time fill from hospital. Future fills and followups by patient's outpatient provider. lisinopril (PRINIVIL, ZESTRIL) 40 mg tablet Take 40 mg by mouth daily. No current facility-administered medications for this visit. SYSTEM REVIEW NOT RELATED TO LIVER DISEASE OR REVIEWED ABOVE:  Constitution systems: Negative for fever, chills, weight gain, weight loss. Eyes: Negative for visual changes. ENT: Negative for sore throat, painful swallowing. Respiratory: Negative for cough, hemoptysis, SOB.    Cardiology: Negative for chest pain, palpitations. GI:  Negative for constipation or diarrhea. : Negative for urinary frequency, dysuria, hematuria, nocturia. Skin: Negative for rash. Hematology: Negative for easy bruising, blood clots. Musculo-skelatal: Negative for back pain, muscle pain, weakness. Neurologic: Negative for headaches, dizziness, vertigo, memory problems not related to HE. Psychology: Negative for anxiety, depression. FAMILY HISTORY:  The father  of scleroderma. The mother  of sepsis. There is no family history of liver disease. SOCIAL HISTORY:  The patient is . The patient has 1 child. The patient has never used tobacco products. The patient has previously consumed alcohol socially never in excess. 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 The patient used to work for [employer]. The patient retired in . PHYSICAL EXAMINATION:  Visit Vitals  /77 (BP 1 Location: Left upper arm, BP Patient Position: Sitting, BP Cuff Size: Adult)   Pulse 99   Temp 97 °F (36.1 °C) (Temporal)   Ht 5' 11\" (1.803 m)   Wt 199 lb (90.3 kg)   SpO2 99%   BMI 27.75 kg/m²     General: No acute distress. Eyes: Sclera anicteric. ENT: No oral lesions. Thyroid normal.  Nodes: No adenopathy. Skin: No spider angiomata. No jaundice. No palmar erythema. Respiratory: Lungs clear to auscultation. Cardiovascular: Regular heart rate. No murmurs. No JVD. Abdomen: Soft non-tender. Liver size normal to percussion/palpation. Spleen not palpable. No obvious ascites. Extremities: No edema. No muscle wasting. No gross arthritic changes. Neurologic: Alert and oriented. Cranial nerves grossly intact. No asterixis.     LABORATORY STUDIES:  Liver [place] of [address] Units 3/1/2023 2023   WBC 4.1 - 11.1 K/uL  9.0   ANC 1.8 - 8.0 K/UL     HGB 12.1 - 17.0 g/dL  11.4 (L)    - 400 K/uL  209   AST 15 - 37 U/L 18    ALT 12 - 78 U/L 22    Alk Phos 45 - 117 U/L 51    Bili, Total 0.2 - 1.0 MG/DL 0.5    Albumin 3.5 - 5.0 g/dL 4.1    BUN 6 - 20 MG/DL 28 (H)    Creat 0.70 - 1.30 MG/DL 1.51 (H)    Na 136 - 145 mmol/L 137    K 3.5 - 5.1 mmol/L 5.2 (H)    Cl 97 - 108 mmol/L 105    CO2 21 - 32 mmol/L 26    Glucose 65 - 100 mg/dL 56 (L)    Magnesium 1.6 - 2.4 mg/dL     Ammonia <32 UMOL/L 49 (H) <10     SEROLOGIES:  Not available or performed. Testing was performed today. LIVER HISTOLOGY:  Not available or performed    ENDOSCOPIC PROCEDURES:  Not available or performed    RADIOLOGY:  2/2023. Dynamic MRI of th liver. Changes consistent with fatty liver. No liver mass lesions. Normal spleen. No ascites. OTHER TESTING:  Not available or performed    FOLLOW-UP:  All of the issues listed above in the Assessment and Plan were discussed with the patient. All questions were answered. The patient expressed a clear understanding of the above. No follow-up at [address] is needed. I would be glad to see the patient back for follow-up at any time in the future if the clinical situation changes.       Frankie Rg MD  [address]  [place] [address]  [address]  [address].  [phone]  FAX:  [phone]

## 2023-03-01 NOTE — Clinical Note
3/23/2023    Patient: Tony Jackson   YOB: 1955   Date of Visit: 3/1/2023     Terry Shah MD  [address] [place] [address]  Catrina Eke [id number]  Via In Basket    Dear Terry hSah MD,      [facility]. My notes for this consultation are attached. If you have questions, please do not hesitate to call me. I look forward to following your patient along with you.       Sincerely,    Talya Germain MD

## 2023-03-03 LAB
ALBUMIN SERPL-MCNC: 4.1 G/DL (ref 3.5–5)
ALBUMIN/GLOB SERPL: 0.9 (ref 1.1–2.2)
ALP SERPL-CCNC: 51 U/L (ref 45–117)
ALT SERPL-CCNC: 22 U/L (ref 12–78)
AMMONIA PLAS-SCNC: 49 UMOL/L
ANION GAP SERPL CALC-SCNC: 6 MMOL/L (ref 5–15)
AST SERPL-CCNC: 18 U/L (ref 15–37)
BILIRUB SERPL-MCNC: 0.5 MG/DL (ref 0.2–1)
BUN SERPL-MCNC: 28 MG/DL (ref 6–20)
BUN/CREAT SERPL: 19 (ref 12–20)
CALCIUM SERPL-MCNC: 10.3 MG/DL (ref 8.5–10.1)
CHLORIDE SERPL-SCNC: 105 MMOL/L (ref 97–108)
CO2 SERPL-SCNC: 26 MMOL/L (ref 21–32)
CREAT SERPL-MCNC: 1.51 MG/DL (ref 0.7–1.3)
GLOBULIN SER CALC-MCNC: 4.7 G/DL (ref 2–4)
GLUCOSE SERPL-MCNC: 56 MG/DL (ref 65–100)
POTASSIUM SERPL-SCNC: 5.2 MMOL/L (ref 3.5–5.1)
PROT SERPL-MCNC: 8.8 G/DL (ref 6.4–8.2)
SODIUM SERPL-SCNC: 137 MMOL/L (ref 136–145)

## 2023-03-03 NOTE — PROGRESS NOTES
Patient office visit was 3/1/23, note will need to be completed before we know if patient needs a follow up appt.

## 2023-03-07 ENCOUNTER — CLINICAL SUPPORT (OUTPATIENT)
Dept: DIABETES SERVICES | Age: 68
End: 2023-03-07
Payer: MEDICARE

## 2023-03-07 DIAGNOSIS — E11.65 TYPE 2 DIABETES MELLITUS WITH HYPERGLYCEMIA, WITHOUT LONG-TERM CURRENT USE OF INSULIN (HCC): Primary | ICD-10-CM

## 2023-03-07 PROCEDURE — G0108 DIAB MANAGE TRN  PER INDIV: HCPCS

## 2023-03-07 NOTE — PROGRESS NOTES
[facility]  Diabetes Self-Management Education & Support Program  Encounter Note    SUMMARY  Diabetes self-care management training was completed related to reducing risks. The participant will return on March 28 to continue DSMES related to healthy eating and monitoring. The participant did identify SMART Goal(s) and will practice knowledge and skills related to reducing risks to improve diabetes self-management. EVALUATION:  Met with Mr. Lewis Monge today to discuss his diabetes and reducing risks. Mr. Lewis Monge reported that he is checking his BGS 2x/day and they are staying within the ADA targets that were discussed today. He reported that he is having some low BGs, but talked with his provider about how to adjust his medications if this continues. Mr. Lewis Monge is up to date on his dental and eye examinations and verbalized understanding of having regular foot examinations and is interested in doing so. Mr. Lewis Monge expressed understanding of all presented material and had no additional questions or concerns at this time. RECOMMENDATIONS:  Reach out to [facility] for refill of his prescription     TOPICS DISCUSSED TODAY:  WHAT IS DIABETES? Minutes: [place]? 30      Next provider visit is scheduled for 3/28/23. SMART GOAL(S)   Will make an appointment to refill prescription at [facility] before next visit. ACHIEVEMENT OF GOAL(S) : 0-24%  Will evaluate at next visit. DATE DSMES TOPIC EVALUATION     3/7/2023 WHAT IS DIABETES?    Role of the normal pancreas in energy balance and blood glucose control   The defect seen in diabetes   Signs & symptoms of diabetes   Diagnosis of diabetes   Types of diabetes   Blood glucose targets in non-pregnant & non-pregnant adults       The participant knows  Their type of diabetes: Yes  The basic physiologic defect: Yes  Blood glucose targets: Yes     DATE DSMES TOPIC EVALUATION     3/7/2023 HOW DO I STAY HEALTHY? Prevention   Vaccinations   Preconception care (if applicable)  Examinations   Eye    Foot   Diabetic complications' prevention   Dental health   Heart health   Kidney Health   Nerve health   Sleep health      The participant has a personal diabetes care record to keep abreast of diabetes health Yes     The participant needs to address making an appointment for a foot exam.         Florentino Aguayo RD on 3/7/2023 at 11:51 AM    I have personally reviewed the health record, including provider notes, laboratory data and current medications before making these care and education recommendations. The time spent in this effort is included in the total time.   Total minutes: 60

## 2023-03-09 LAB — HBA1C MFR BLD HPLC: 8.3 %

## 2023-03-23 PROBLEM — E11.9 TYPE II DIABETES MELLITUS (HCC): Status: ACTIVE | Noted: 2023-03-23

## 2023-03-23 PROBLEM — I10 HYPERTENSION: Status: ACTIVE | Noted: 2023-03-23

## 2023-03-23 PROBLEM — E78.00 HYPERCHOLESTEROLEMIA: Status: ACTIVE | Noted: 2023-03-23

## 2023-03-23 PROBLEM — E72.20 SERUM AMMONIA INCREASED (HCC): Status: ACTIVE | Noted: 2023-03-23

## 2023-03-24 ENCOUNTER — OFFICE VISIT (OUTPATIENT)
Dept: INTERNAL MEDICINE CLINIC | Age: 68
End: 2023-03-24

## 2023-03-24 VITALS
HEIGHT: 71 IN | SYSTOLIC BLOOD PRESSURE: 114 MMHG | DIASTOLIC BLOOD PRESSURE: 74 MMHG | RESPIRATION RATE: 16 BRPM | HEART RATE: 90 BPM | OXYGEN SATURATION: 98 % | BODY MASS INDEX: 28.63 KG/M2 | WEIGHT: 204.5 LBS | TEMPERATURE: 97.7 F

## 2023-03-24 DIAGNOSIS — G47.33 OSA (OBSTRUCTIVE SLEEP APNEA): ICD-10-CM

## 2023-03-24 DIAGNOSIS — N18.30 STAGE 3 CHRONIC KIDNEY DISEASE, UNSPECIFIED WHETHER STAGE 3A OR 3B CKD (HCC): ICD-10-CM

## 2023-03-24 DIAGNOSIS — E11.51 TYPE 2 DIABETES MELLITUS WITH DIABETIC PERIPHERAL ANGIOPATHY WITHOUT GANGRENE, WITHOUT LONG-TERM CURRENT USE OF INSULIN (HCC): Primary | ICD-10-CM

## 2023-03-24 DIAGNOSIS — E78.1 HYPERTRIGLYCERIDEMIA: ICD-10-CM

## 2023-03-24 DIAGNOSIS — Z12.11 SCREEN FOR COLON CANCER: ICD-10-CM

## 2023-03-24 RX ORDER — LANCETS
EACH MISCELLANEOUS
Qty: 100 EACH | Refills: 11 | Status: SHIPPED | OUTPATIENT
Start: 2023-03-24

## 2023-03-24 RX ORDER — DULAGLUTIDE 1.5 MG/.5ML
1.5 INJECTION, SOLUTION SUBCUTANEOUS
Qty: 4 EACH | Refills: 0 | Status: SHIPPED | OUTPATIENT
Start: 2023-03-24

## 2023-03-24 RX ORDER — FEBUXOSTAT 80 MG/1
TABLET, FILM COATED ORAL
COMMUNITY
Start: 2018-01-01

## 2023-03-24 RX ORDER — GLIPIZIDE 5 MG/1
5 TABLET ORAL 2 TIMES DAILY
Qty: 60 TABLET | Refills: 1 | Status: SHIPPED | OUTPATIENT
Start: 2023-03-24

## 2023-03-24 NOTE — PROGRESS NOTES
1. \"Have you been to the ER, urgent care clinic since your last visit? Hospitalized since your last visit? \" Yes [facility] ER 01/16/2023 Hyperglycemia and UTI, [facility] 03/2023 Foot infection    2. \"Have you seen or consulted any other health care providers outside of the [address] [place] [address] since your last visit? \" Yes [facility] ER      3. For patients aged 39-70: Has the patient had a colonoscopy / FIT/ Cologuard?  No

## 2023-03-24 NOTE — PROGRESS NOTES
Good Help to Those in [facility]   Internal Medicine  [address] [facility] [address], [address] [facility]  [address]  [place], 200 [facility]  [phone]      Primary Care Visit Note    Assessment/Plan:     Diagnoses and all orders for this visit:    DMII - patient has multiple lows recently, therefore will back off on glipizide. Will plan to ultimately discontinue and likely increase trulicity if needed. -     glucose blood VI test strips strip; As needed for blood sugar checks  -     dulaglutide (Trulicity) 1.5 IN/0.0 mL sub-q pen; 0.5 mL by SubCUTAneous route every seven (7) days.  -   DECREASE  glipiZIDE (GLUCOTROL) 5 mg tablet; Take 1 Tablet by mouth two (2) times a day. TAKE 2 TABLETS BY MOUTH TWICE DAILY. One time fill from hospital. Future fills from pcp/outpatient provider who will need to repeat labwork and check blood sugar.  -     lancets misc; As needed for blood sugar checks    CKD3 - eGFR stable at ~50 for the past 10 years per patient  -     METABOLIC PANEL, BASIC; Future    [place]- not currently on cpap  -     SLEEP MEDICINE REFERRAL    HLD  Severely elevated TG   - on fenofibrate, will recheck     Gout  - following with rheumatology    Obesity - has been successful with dietary changes, had been as high as ~250 lbs    Screen for colon cancer  -     REFERRAL FOR COLONOSCOPY    HM - declines all vaccines    Follow up: Follow-up and Dispositions    Return in about 2 months (around 5/24/2023). Jackie Baxter MD    CC:     Chief Complaint   Patient presents with    Annual Wellness Visit    Establish Care       HPI:     Alyx Whiteside is a 79 y.o. male who presents for:    Establishing care. Pt had a recent infection in his R 3rd toe. 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 Pt is following with [facility] wound care, and has an appt with [facility] wound care for hyperbaric chamber. Pt states his diabetes is under control recently.  [facility]. A1c was >14% in January. Prior to that he had been in some denial about having diabetes and was not checking his blood sugar or taking medication. Patient has been checking BG in the mornings which has been running 50s-80s primarily in the last week. Has had eGFR of 50 for >10 years per pt. Pt to make appt with his eye doctor. Obesity- was up to 240 -250 now down down to 204. ROS:   All 10 point review of systems negative except those mentioned in the HPI. 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    Serum ammonia increased ([facility] [address] 03/23/2023    Hypertension 03/23/2023    Type II diabetes mellitus ([facility] 03/23/2023    Hypercholesterolemia 03/23/2023    Chronic renal disease, stage III 03/24/2023     Resolved Ambulatory Problems     Diagnosis Date Noted    Knee pain 05/03/2012    Finger pain 05/03/2012    Encounter for long-term (current) use of other medications 05/04/2012    Acute hepatic encephalopathy 01/16/2023    Type 2 diabetes mellitus with hyperosmolar nonketotic hyperglycemia ([facility] [address] 01/17/2023     Past Medical History:   Diagnosis Date    Diabetes ([facility] [address]           Current Medications:     Current Outpatient Medications:     febuxostat (ULORIC) 80 mg tab tablet, Uloric 80 mg tablet, Disp: , Rfl:     glucose blood VI test strips strip, As needed for blood sugar checks, Disp: 100 Strip, Rfl: 11    dulaglutide (Trulicity) 1.5 SR/6.2 mL sub-q pen, 0.5 mL by SubCUTAneous route every seven (7) days. , Disp: 4 Each, Rfl: 0    glipiZIDE (GLUCOTROL) 5 mg tablet, Take 1 Tablet by mouth two (2) times a day. TAKE 2 TABLETS BY MOUTH TWICE DAILY.  One time fill from hospital. Future fills from pcp/outpatient provider who will need to repeat labwork and check blood sugar., Disp: 60 Tablet, Rfl: 1    lancets misc, As needed for blood sugar checks, Disp: 100 Each, Rfl: 11    fenofibrate (LOFIBRA) 160 mg tablet, TAKE 1 TABLET BY MOUTH EVERY DAY. One time fill from hospital. Future fills and followups by patient's outpatient provider., Disp: 30 Tablet, Rfl: 0    lisinopril (PRINIVIL, ZESTRIL) 40 mg tablet, Take 40 mg by mouth daily.   , Disp: , Rfl:       Past Surgical History:     Past Surgical History:   Procedure Laterality Date    HX AMPUTATION TOE      HX ORTHOPAEDIC      removal gouty tophus    FL EXCISION CHALAZION SINGLE           Family History:     Family History   Problem Relation Age of Onset    Other Father         scleroderma    Stroke Other         sibling    Stroke Mother          Social History:     Social History     Socioeconomic History    Marital status:      Spouse name: Not on file    Number of children: Not on file    Years of education: Not on file    Highest education level: Not on file   Occupational History    Not on file   Tobacco Use    Smoking status: Never    Smokeless tobacco: Never   Substance and Sexual Activity    Alcohol use: No    Drug use: Not on file    Sexual activity: Not on file   Other Topics Concern     Service Not Asked    Blood Transfusions Not Asked    Caffeine Concern Not Asked    Occupational Exposure Not Asked    Hobby Hazards Not Asked    Sleep Concern Not Asked    Stress Concern Not Asked    Weight Concern Not Asked    Special Diet Not Asked    Back Care Not Asked    Exercise Not Asked    Bike Helmet Not Asked    Seat Belt Not Asked    Self-Exams Not Asked   Social History Narrative    Not on file     Social Determinants of Health     Financial Resource Strain: Low Risk     Difficulty of Paying Living Expenses: Not hard at all   Food Insecurity: No Food Insecurity    Worried About Running Out of Food in the Last Year: Never true    Ran Out of Food in the Last Year: Never true   Transportation Needs: Not on file   Physical Activity: Not on file   Stress: Not on file   Social Connections: Not on file   Intimate Partner Violence: Not on file   Housing Stability: Not on file            Visit Vitals  /74 (BP 1 Location: Left upper arm, BP Patient Position: Sitting)   Pulse 90   Temp 97.7 °F (36.5 °C) (Temporal)   Resp 16   Ht 5' 11\" (1.803 m)   Wt 204 lb 8 oz (92.8 kg)   SpO2 98%   BMI 28.52 kg/m²       Physical Exam:   General - Well appearing  HEENT - EOMI, non-icteric sclera. Pulm - clear to auscultation bilaterally  Cardio - RRR, normal S1 S2, no murmur  Abd - soft, nontender, nabs  Extrem - no edema  Neuro-  Alert and oriented, No focal deficits      Labs/Imaging:     Labs and imaging reviewed by me and significant for:    Lab Results   Component Value Date/Time    Hemoglobin A1c >14.0 (H) 01/16/2023 08:07 PM    Hemoglobin A1c, External 8.3 03/09/2023 12:00 AM     Lab Results   Component Value Date/Time    Sodium 137 03/01/2023 04:00 PM    Potassium 5.2 (H) 03/01/2023 04:00 PM    Chloride 105 03/01/2023 04:00 PM    CO2 26 03/01/2023 04:00 PM    Anion gap 6 03/01/2023 04:00 PM    Glucose 56 (L) 03/01/2023 04:00 PM    BUN 28 (H) 03/01/2023 04:00 PM    Creatinine 1.51 (H) 03/01/2023 04:00 PM    BUN/Creatinine ratio 19 03/01/2023 04:00 PM    GFR est AA 38 (L) 01/13/2021 03:53 PM    GFR est non-AA 31 (L) 01/13/2021 03:53 PM    Calcium 10.3 (H) 03/01/2023 04:00 PM    Bilirubin, total 0.5 03/01/2023 04:00 PM    Alk. phosphatase 51 03/01/2023 04:00 PM    Protein, total 8.8 (H) 03/01/2023 04:00 PM    Albumin 4.1 03/01/2023 04:00 PM    Globulin 4.7 (H) 03/01/2023 04:00 PM    A-G Ratio 0.9 (L) 03/01/2023 04:00 PM    ALT (SGPT) 22 03/01/2023 04:00 PM    AST (SGOT) 18 03/01/2023 04:00 PM     Lab Results   Component Value Date/Time    WBC 9.0 01/20/2023 02:15 AM    HGB 11.4 (L) 01/20/2023 02:15 AM    HCT 35.4 (L) 01/20/2023 02:15 AM    PLATELET 261 52/06/0030 02:15 AM    MCV 87.2 01/20/2023 02:15 AM           Please note that this dictation was completed in part with [employer], the [employer] voice recognition software.   Quite often unanticipated grammatical, syntax, homophones, and other interpretive errors are inadvertently transcribed by the computer software. Please disregard these errors. Please excuse any errors that have escaped final proofreading.

## 2023-03-25 LAB
ANION GAP SERPL CALC-SCNC: 1 MMOL/L (ref 5–15)
BUN SERPL-MCNC: 28 MG/DL (ref 6–20)
BUN/CREAT SERPL: 18 (ref 12–20)
CALCIUM SERPL-MCNC: 10.4 MG/DL (ref 8.5–10.1)
CHLORIDE SERPL-SCNC: 107 MMOL/L (ref 97–108)
CHOLEST SERPL-MCNC: 209 MG/DL
CO2 SERPL-SCNC: 29 MMOL/L (ref 21–32)
CREAT SERPL-MCNC: 1.54 MG/DL (ref 0.7–1.3)
GLUCOSE SERPL-MCNC: 67 MG/DL (ref 65–100)
HDLC SERPL-MCNC: 33 MG/DL
HDLC SERPL: 6.3 (ref 0–5)
LDLC SERPL CALC-MCNC: 108.8 MG/DL (ref 0–100)
POTASSIUM SERPL-SCNC: 4.8 MMOL/L (ref 3.5–5.1)
SODIUM SERPL-SCNC: 137 MMOL/L (ref 136–145)
TRIGL SERPL-MCNC: 336 MG/DL (ref ?–150)
VLDLC SERPL CALC-MCNC: 67.2 MG/DL

## 2023-03-30 ENCOUNTER — TELEPHONE (OUTPATIENT)
Dept: INTERNAL MEDICINE CLINIC | Age: 68
End: 2023-03-30

## 2023-03-30 NOTE — TELEPHONE ENCOUNTER
Freestyle freedom lite meter being used    States the strips/lancets sent in were incorrect for his meter    Please send STRIPS and LANCETS for freestyle freedom lite       [place] [address] #[id number] - Lisa Roman, [address] AT [address] [place] [phone]

## 2023-03-30 NOTE — TELEPHONE ENCOUNTER
Called, spoke to pt. Two pt identifiers confirmed. Patient informed prescription for Freedom Freestyle test strips , lancet, and meter has been phoned in to [facility].    [facility] gave vorb. Patient verbalized understanding of information discussed w/ no further questions at this time.        Evelyn Fan LPN

## 2023-04-09 PROBLEM — Z89.429 HISTORY OF AMPUTATION OF TOE (HCC): Status: ACTIVE | Noted: 2023-04-09

## 2023-05-23 RX ORDER — FENOFIBRATE 160 MG/1
TABLET ORAL
COMMUNITY
Start: 2023-01-22 | End: 2023-05-31 | Stop reason: SDUPTHER

## 2023-05-23 RX ORDER — GLIPIZIDE 5 MG/1
5 TABLET ORAL 2 TIMES DAILY
COMMUNITY
Start: 2023-03-24 | End: 2023-05-31

## 2023-05-23 RX ORDER — FEBUXOSTAT 80 MG/1
TABLET, FILM COATED ORAL
COMMUNITY
Start: 2018-01-01

## 2023-05-23 RX ORDER — LISINOPRIL 40 MG/1
40 TABLET ORAL DAILY
COMMUNITY

## 2023-05-23 RX ORDER — DULAGLUTIDE 1.5 MG/.5ML
1.5 INJECTION, SOLUTION SUBCUTANEOUS
COMMUNITY
Start: 2023-03-24 | End: 2023-05-31

## 2023-05-23 RX ORDER — LANCETS 30 GAUGE
EACH MISCELLANEOUS
COMMUNITY
Start: 2023-03-24

## 2023-05-30 ENCOUNTER — NURSE ONLY (OUTPATIENT)
Age: 68
End: 2023-05-30
Payer: MEDICARE

## 2023-05-30 DIAGNOSIS — E11.65 TYPE 2 DIABETES MELLITUS WITH HYPERGLYCEMIA, WITHOUT LONG-TERM CURRENT USE OF INSULIN (HCC): Primary | ICD-10-CM

## 2023-05-30 PROCEDURE — G0108 DIAB MANAGE TRN  PER INDIV: HCPCS

## 2023-05-30 NOTE — PROGRESS NOTES
[facility] for Diabetes Health  Diabetes Self-Management Education & Support Program  Encounter Note      SUMMARY  Diabetes self-care management training was completed related to healthy eating. The participant will return on June 19 to continue DSMES related to healthy eating and monitoring. The participant did not identify SMART Goal(s) and will practice knowledge and skills related to reducing risks and healthy eating to improve diabetes self-management. EVALUATION:  Met with Mr. Chrissy Payan today who reported that his fasting BGs have been between  and 2 hours after meals around 110. He shared that he recently had an infected toe, but it has been healing well and is not currently causing him any issues, but he would continue to monitor it. Today we discussed healthy eating using food models, Healthy Plate, and reading nutrition facts labels. After practicing building a Healthy Plate together, Mr. Chrissy Payan was surprised to see he could eat more than he thought and felt that he would be satisfied after that meal. We also looked up some of his favorite fast food restaurant orders and made adjustments if needed. Mr. Chrissy Payan verbalized understanding of all presented material and had no additional questions or concerns at this time. RECOMMENDATIONS:  Practice using Healthy Plate to build balanced meals     TOPICS DISCUSSED TODAY:  WHAT CAN I EAT? 60      Next provider visit is scheduled for 6/19/23 with me. DATE DSMES TOPIC EVALUATION     5/30/2023 WHAT CAN I EAT?    General principles   Determining a healthy weight   Nutritional terms & tools   Healthy Plate method   Carbohydrate Counting   Reading food labels   Free apps   Pregnancy recommendations      The participant   Uses Healthy Plate principles in constructing meals: Yes  Reads food labels in choosing acceptable foods: Yes    The participant needs to address using measuring cups to measure out his carbohydrate containing foods at

## 2023-05-31 ENCOUNTER — OFFICE VISIT (OUTPATIENT)
Age: 68
End: 2023-05-31
Payer: MEDICARE

## 2023-05-31 VITALS
HEIGHT: 71 IN | SYSTOLIC BLOOD PRESSURE: 114 MMHG | WEIGHT: 218 LBS | HEART RATE: 90 BPM | BODY MASS INDEX: 30.52 KG/M2 | TEMPERATURE: 97.7 F | OXYGEN SATURATION: 98 % | RESPIRATION RATE: 16 BRPM | DIASTOLIC BLOOD PRESSURE: 84 MMHG

## 2023-05-31 DIAGNOSIS — E11.51 TYPE 2 DIABETES MELLITUS WITH DIABETIC PERIPHERAL ANGIOPATHY WITHOUT GANGRENE, WITHOUT LONG-TERM CURRENT USE OF INSULIN (HCC): Primary | ICD-10-CM

## 2023-05-31 DIAGNOSIS — E78.1 HYPERTRIGLYCERIDEMIA: ICD-10-CM

## 2023-05-31 PROCEDURE — 99213 OFFICE O/P EST LOW 20 MIN: CPT | Performed by: STUDENT IN AN ORGANIZED HEALTH CARE EDUCATION/TRAINING PROGRAM

## 2023-05-31 PROCEDURE — 3046F HEMOGLOBIN A1C LEVEL >9.0%: CPT | Performed by: STUDENT IN AN ORGANIZED HEALTH CARE EDUCATION/TRAINING PROGRAM

## 2023-05-31 PROCEDURE — 1123F ACP DISCUSS/DSCN MKR DOCD: CPT | Performed by: STUDENT IN AN ORGANIZED HEALTH CARE EDUCATION/TRAINING PROGRAM

## 2023-05-31 PROCEDURE — 3078F DIAST BP <80 MM HG: CPT | Performed by: STUDENT IN AN ORGANIZED HEALTH CARE EDUCATION/TRAINING PROGRAM

## 2023-05-31 PROCEDURE — G8428 CUR MEDS NOT DOCUMENT: HCPCS | Performed by: STUDENT IN AN ORGANIZED HEALTH CARE EDUCATION/TRAINING PROGRAM

## 2023-05-31 PROCEDURE — 2022F DILAT RTA XM EVC RTNOPTHY: CPT | Performed by: STUDENT IN AN ORGANIZED HEALTH CARE EDUCATION/TRAINING PROGRAM

## 2023-05-31 PROCEDURE — G8417 CALC BMI ABV UP PARAM F/U: HCPCS | Performed by: STUDENT IN AN ORGANIZED HEALTH CARE EDUCATION/TRAINING PROGRAM

## 2023-05-31 PROCEDURE — 3017F COLORECTAL CA SCREEN DOC REV: CPT | Performed by: STUDENT IN AN ORGANIZED HEALTH CARE EDUCATION/TRAINING PROGRAM

## 2023-05-31 PROCEDURE — 1036F TOBACCO NON-USER: CPT | Performed by: STUDENT IN AN ORGANIZED HEALTH CARE EDUCATION/TRAINING PROGRAM

## 2023-05-31 PROCEDURE — 3074F SYST BP LT 130 MM HG: CPT | Performed by: STUDENT IN AN ORGANIZED HEALTH CARE EDUCATION/TRAINING PROGRAM

## 2023-05-31 RX ORDER — FENOFIBRATE 160 MG/1
TABLET ORAL
Qty: 90 TABLET | Refills: 0 | Status: SHIPPED | OUTPATIENT
Start: 2023-05-31

## 2023-05-31 RX ORDER — DULAGLUTIDE 3 MG/.5ML
3 INJECTION, SOLUTION SUBCUTANEOUS WEEKLY
Qty: 2 ML | Refills: 3 | Status: SHIPPED | OUTPATIENT
Start: 2023-05-31

## 2023-05-31 SDOH — ECONOMIC STABILITY: FOOD INSECURITY: WITHIN THE PAST 12 MONTHS, THE FOOD YOU BOUGHT JUST DIDN'T LAST AND YOU DIDN'T HAVE MONEY TO GET MORE.: NEVER TRUE

## 2023-05-31 SDOH — ECONOMIC STABILITY: FOOD INSECURITY: WITHIN THE PAST 12 MONTHS, YOU WORRIED THAT YOUR FOOD WOULD RUN OUT BEFORE YOU GOT MONEY TO BUY MORE.: NEVER TRUE

## 2023-05-31 ASSESSMENT — PATIENT HEALTH QUESTIONNAIRE - PHQ9
SUM OF ALL RESPONSES TO PHQ QUESTIONS 1-9: 0
1. LITTLE INTEREST OR PLEASURE IN DOING THINGS: 0
SUM OF ALL RESPONSES TO PHQ9 QUESTIONS 1 & 2: 0
2. FEELING DOWN, DEPRESSED OR HOPELESS: 0
SUM OF ALL RESPONSES TO PHQ QUESTIONS 1-9: 0

## 2023-05-31 ASSESSMENT — SOCIAL DETERMINANTS OF HEALTH (SDOH): HOW HARD IS IT FOR YOU TO PAY FOR THE VERY BASICS LIKE FOOD, HOUSING, MEDICAL CARE, AND HEATING?: NOT HARD AT ALL

## 2023-05-31 NOTE — PROGRESS NOTES
Good Help to Those in [facility]   Internal Medicine  [address] [facility] [address], [address] [facility]  [address]  [place], [address] [facility]  [phone]      Primary Care Visit Note    Assessment/Plan:     DMII - discontinue glipizide, increase trulicity to 3.0 mg weekly   - Pt refuses metformin because he does not think that it works for him. CKD3 - eGFR stable at ~50 for the past 10 years per patient      BOGDAN- not currently on cpap, referred to sleep medicine at previous visit. HLD   Severely elevated TG    - on fenofibrate, will recheck FLP  - Pt reluctant to start statin based on his personal research. Gout  - following with rheumatology      Obesity - has been successful with dietary changes, had been as high as ~250 lbs      Screen for colon cancer   -     REFERRAL FOR COLONOSCOPY      HM - declines all vaccines      Follow up:  Return in about 3 months (around 8/31/2023) for office visit. Justin Denny MD    CC:     Chief Complaint   Patient presents with    Diabetes       HPI:     Ying Tesfaye is a 79 y.o. male who presents for:    Follow up. The 10-year ASCVD risk score (Olga VÁZQUEZ, et al., 2019) is: 29.9%    Values used to calculate the score:      Age: 79 years      Sex: Male      Is Non- : No      Diabetic: Yes      Tobacco smoker: No      Systolic Blood Pressure: 875 mmHg      Is BP treated: Yes      HDL Cholesterol: 33 MG/DL      Total Cholesterol: 209 MG/DL    Discussed ascvd risk score. Patient reluctant to start a statin. DM - bg at home has been running 70 - 111 throughout the day per patient. BP typically running in the 120's. ROS:   All 10 point review of systems negative except those mentioned in the HPI. 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

## 2023-05-31 NOTE — PROGRESS NOTES
1. \"Have you been to the ER, urgent care clinic since your last visit? Hospitalized since your last visit? yes, Urgent care, h[facility]. [facility].    2. \"Have you seen or consulted any other health care providers outside of the [address] [place] [address] since your last visit? yes    3. For patients aged 39-70: Has the patient had a colonoscopy / FIT/ Cologuard? No      If the patient is female:    4. For patients aged 41-77: Has the patient had a mammogram within the past 2 years? NA - based on age or sex      11. For patients aged 21-65: Has the patient had a pap smear?  NA - based on age or sex

## 2023-06-07 ENCOUNTER — CLINICAL DOCUMENTATION (OUTPATIENT)
Age: 68
End: 2023-06-07

## 2023-06-19 ENCOUNTER — NURSE ONLY (OUTPATIENT)
Age: 68
End: 2023-06-19
Payer: MEDICARE

## 2023-06-19 DIAGNOSIS — E11.65 TYPE 2 DIABETES MELLITUS WITH HYPERGLYCEMIA, WITHOUT LONG-TERM CURRENT USE OF INSULIN (HCC): Primary | ICD-10-CM

## 2023-06-19 PROCEDURE — G0108 DIAB MANAGE TRN  PER INDIV: HCPCS

## 2023-06-19 NOTE — PROGRESS NOTES
[employer] [facility] for Diabetes Health  Diabetes Self-Management Education & Support Program  Encounter Note      SUMMARY  Diabetes self-care management training was completed related to taking medications and physical activity. The participant will return on July 17 to continue DSMES related to monitoring and problem solving. The participant did identify SMART Goal(s) and will practice knowledge and skills related to reducing risks, being active and medications, and healthy eating to improve diabetes self-management. EVALUATION:  Met with Mr. Zan Mcgee today who shared that his fasting BGs have been  and 2 hours after meals around 107. Today we discussed medications and physical activity. Mr. Zan Mcgee reported he is taking his Trulicity as prescribed, not experiencing any side effects, and expressed understanding of the action of this medication. Mr. Zan Mcgee reported that he does 30 minutes of weightlifting exercises everyday, but wanted to increase this, so made a SMART goal to do so. He expressed understanding of the benefits of physical activity on not only his diabetes health, but overall health as well. Mr. Zan Mcgee had no additional questions or concerns at this time about the material presented today and exhibited understanding of all topics. RECOMMENDATIONS:  Continue participating in webme 9? [facility] AFFECT MY DIABETES? 30      Next provider visit is scheduled for 7/17/23 with me. SMART GOAL(S)  Increase physical activity by going to the gym for 60 minutes 2 times over the next week. ACHIEVEMENT OF GOAL(S) : 0-24%  Will evaluate at next visit. DATE DSMES TOPIC EVALUATION     6/19/2023 HOW DO MY DIABETES MEDICATIONS WORK?    Type 1 medications & methods   Insulin injections   Injection sites   Type 2 medications   Oral agents   GLP-1 agonists   Hypoglycemia symptoms & treatment   Glucagon emergency

## 2023-06-23 ENCOUNTER — TELEPHONE (OUTPATIENT)
Age: 68
End: 2023-06-23

## 2023-06-23 NOTE — TELEPHONE ENCOUNTER
Received records ID visit on 6/14/23 with Dr. Mickey Wagner. He is continuing bactrim for 3 more weeks.

## 2023-07-19 ENCOUNTER — HOSPITAL ENCOUNTER (OUTPATIENT)
Facility: HOSPITAL | Age: 68
Setting detail: INFUSION SERIES
End: 2023-07-19
Payer: MEDICARE

## 2023-07-19 VITALS
WEIGHT: 207.8 LBS | OXYGEN SATURATION: 100 % | DIASTOLIC BLOOD PRESSURE: 82 MMHG | BODY MASS INDEX: 28.98 KG/M2 | RESPIRATION RATE: 16 BRPM | SYSTOLIC BLOOD PRESSURE: 136 MMHG | HEART RATE: 96 BPM | TEMPERATURE: 97.2 F

## 2023-07-19 DIAGNOSIS — R78.81 BACTEREMIA: Primary | ICD-10-CM

## 2023-07-19 PROCEDURE — 6360000002 HC RX W HCPCS: Performed by: NURSE PRACTITIONER

## 2023-07-19 PROCEDURE — 2580000003 HC RX 258: Performed by: NURSE PRACTITIONER

## 2023-07-19 PROCEDURE — 96365 THER/PROPH/DIAG IV INF INIT: CPT

## 2023-07-19 RX ORDER — ALBUTEROL SULFATE 90 UG/1
4 AEROSOL, METERED RESPIRATORY (INHALATION) PRN
Status: CANCELLED | OUTPATIENT
Start: 2023-07-22

## 2023-07-19 RX ORDER — EPINEPHRINE 1 MG/ML
0.3 INJECTION, SOLUTION, CONCENTRATE INTRAVENOUS PRN
Status: CANCELLED | OUTPATIENT
Start: 2023-07-22

## 2023-07-19 RX ORDER — DIPHENHYDRAMINE HYDROCHLORIDE 50 MG/ML
50 INJECTION INTRAMUSCULAR; INTRAVENOUS
Status: CANCELLED | OUTPATIENT
Start: 2023-07-22

## 2023-07-19 RX ORDER — ONDANSETRON 2 MG/ML
8 INJECTION INTRAMUSCULAR; INTRAVENOUS
Status: CANCELLED | OUTPATIENT
Start: 2023-07-22

## 2023-07-19 RX ORDER — SODIUM CHLORIDE 9 MG/ML
INJECTION, SOLUTION INTRAVENOUS CONTINUOUS
Status: CANCELLED | OUTPATIENT
Start: 2023-07-22

## 2023-07-19 RX ORDER — EPINEPHRINE 1 MG/ML
0.3 INJECTION, SOLUTION, CONCENTRATE INTRAVENOUS PRN
Status: CANCELLED | OUTPATIENT
Start: 2023-07-20

## 2023-07-19 RX ORDER — ALBUTEROL SULFATE 90 UG/1
4 AEROSOL, METERED RESPIRATORY (INHALATION) PRN
Status: CANCELLED | OUTPATIENT
Start: 2023-07-20

## 2023-07-19 RX ORDER — HEPARIN 100 UNIT/ML
500 SYRINGE INTRAVENOUS PRN
Status: CANCELLED | OUTPATIENT
Start: 2023-07-20

## 2023-07-19 RX ORDER — HEPARIN 100 UNIT/ML
500 SYRINGE INTRAVENOUS PRN
Status: CANCELLED | OUTPATIENT
Start: 2023-07-22

## 2023-07-19 RX ORDER — ACETAMINOPHEN 325 MG/1
650 TABLET ORAL
Status: CANCELLED | OUTPATIENT
Start: 2023-07-20

## 2023-07-19 RX ORDER — SODIUM CHLORIDE 0.9 % (FLUSH) 0.9 %
5-40 SYRINGE (ML) INJECTION PRN
Status: DISCONTINUED | OUTPATIENT
Start: 2023-07-19 | End: 2023-07-20 | Stop reason: HOSPADM

## 2023-07-19 RX ORDER — DIPHENHYDRAMINE HYDROCHLORIDE 50 MG/ML
50 INJECTION INTRAMUSCULAR; INTRAVENOUS
Status: CANCELLED | OUTPATIENT
Start: 2023-07-20

## 2023-07-19 RX ORDER — SODIUM CHLORIDE 9 MG/ML
5-250 INJECTION, SOLUTION INTRAVENOUS PRN
Status: CANCELLED | OUTPATIENT
Start: 2023-07-22

## 2023-07-19 RX ORDER — ACETAMINOPHEN 325 MG/1
650 TABLET ORAL
Status: CANCELLED | OUTPATIENT
Start: 2023-07-22

## 2023-07-19 RX ORDER — ONDANSETRON 2 MG/ML
8 INJECTION INTRAMUSCULAR; INTRAVENOUS
Status: CANCELLED | OUTPATIENT
Start: 2023-07-20

## 2023-07-19 RX ORDER — SODIUM CHLORIDE 9 MG/ML
5-250 INJECTION, SOLUTION INTRAVENOUS PRN
Status: CANCELLED | OUTPATIENT
Start: 2023-07-20

## 2023-07-19 RX ORDER — SODIUM CHLORIDE 0.9 % (FLUSH) 0.9 %
5-40 SYRINGE (ML) INJECTION PRN
Status: CANCELLED | OUTPATIENT
Start: 2023-07-22

## 2023-07-19 RX ORDER — SODIUM CHLORIDE 9 MG/ML
INJECTION, SOLUTION INTRAVENOUS CONTINUOUS
Status: CANCELLED | OUTPATIENT
Start: 2023-07-20

## 2023-07-19 RX ORDER — SODIUM CHLORIDE 0.9 % (FLUSH) 0.9 %
5-40 SYRINGE (ML) INJECTION PRN
Status: CANCELLED | OUTPATIENT
Start: 2023-07-20

## 2023-07-19 RX ADMIN — ERTAPENEM SODIUM 1000 MG: 1 INJECTION, POWDER, LYOPHILIZED, FOR SOLUTION INTRAMUSCULAR; INTRAVENOUS at 09:17

## 2023-07-19 ASSESSMENT — PAIN SCALES - GENERAL: PAINLEVEL_OUTOF10: 5

## 2023-07-19 ASSESSMENT — PAIN DESCRIPTION - ORIENTATION: ORIENTATION: RIGHT

## 2023-07-19 ASSESSMENT — PAIN DESCRIPTION - LOCATION: LOCATION: WRIST

## 2023-07-19 ASSESSMENT — PAIN DESCRIPTION - DESCRIPTORS: DESCRIPTORS: ACHING

## 2023-07-20 ENCOUNTER — HOSPITAL ENCOUNTER (OUTPATIENT)
Facility: HOSPITAL | Age: 68
Setting detail: INFUSION SERIES
End: 2023-07-20
Payer: MEDICARE

## 2023-07-20 VITALS — DIASTOLIC BLOOD PRESSURE: 70 MMHG | HEART RATE: 94 BPM | TEMPERATURE: 98 F | SYSTOLIC BLOOD PRESSURE: 111 MMHG

## 2023-07-20 DIAGNOSIS — R78.81 BACTEREMIA: Primary | ICD-10-CM

## 2023-07-20 PROCEDURE — 6360000002 HC RX W HCPCS: Performed by: NURSE PRACTITIONER

## 2023-07-20 PROCEDURE — 2580000003 HC RX 258: Performed by: NURSE PRACTITIONER

## 2023-07-20 PROCEDURE — 96365 THER/PROPH/DIAG IV INF INIT: CPT

## 2023-07-20 RX ORDER — HEPARIN 100 UNIT/ML
500 SYRINGE INTRAVENOUS PRN
Status: CANCELLED | OUTPATIENT
Start: 2023-07-21

## 2023-07-20 RX ORDER — SODIUM CHLORIDE 0.9 % (FLUSH) 0.9 %
5-40 SYRINGE (ML) INJECTION PRN
Status: CANCELLED | OUTPATIENT
Start: 2023-07-21

## 2023-07-20 RX ORDER — ACETAMINOPHEN 325 MG/1
650 TABLET ORAL
Status: CANCELLED | OUTPATIENT
Start: 2023-07-21

## 2023-07-20 RX ORDER — SODIUM CHLORIDE 0.9 % (FLUSH) 0.9 %
5-40 SYRINGE (ML) INJECTION PRN
Status: DISCONTINUED | OUTPATIENT
Start: 2023-07-20 | End: 2023-07-21 | Stop reason: HOSPADM

## 2023-07-20 RX ORDER — EPINEPHRINE 1 MG/ML
0.3 INJECTION, SOLUTION, CONCENTRATE INTRAVENOUS PRN
Status: CANCELLED | OUTPATIENT
Start: 2023-07-21

## 2023-07-20 RX ORDER — DIPHENHYDRAMINE HYDROCHLORIDE 50 MG/ML
50 INJECTION INTRAMUSCULAR; INTRAVENOUS
Status: CANCELLED | OUTPATIENT
Start: 2023-07-21

## 2023-07-20 RX ORDER — SODIUM CHLORIDE 9 MG/ML
5-250 INJECTION, SOLUTION INTRAVENOUS PRN
Status: CANCELLED | OUTPATIENT
Start: 2023-07-21

## 2023-07-20 RX ORDER — ALBUTEROL SULFATE 90 UG/1
4 AEROSOL, METERED RESPIRATORY (INHALATION) PRN
Status: CANCELLED | OUTPATIENT
Start: 2023-07-21

## 2023-07-20 RX ORDER — ONDANSETRON 2 MG/ML
8 INJECTION INTRAMUSCULAR; INTRAVENOUS
Status: CANCELLED | OUTPATIENT
Start: 2023-07-21

## 2023-07-20 RX ORDER — SODIUM CHLORIDE 9 MG/ML
INJECTION, SOLUTION INTRAVENOUS CONTINUOUS
Status: CANCELLED | OUTPATIENT
Start: 2023-07-21

## 2023-07-20 RX ORDER — SODIUM CHLORIDE 9 MG/ML
5-250 INJECTION, SOLUTION INTRAVENOUS PRN
Status: DISCONTINUED | OUTPATIENT
Start: 2023-07-20 | End: 2023-07-21 | Stop reason: HOSPADM

## 2023-07-20 RX ADMIN — Medication 10 ML: at 15:10

## 2023-07-20 RX ADMIN — Medication 10 ML: at 16:00

## 2023-07-20 RX ADMIN — ERTAPENEM SODIUM 1000 MG: 1 INJECTION, POWDER, LYOPHILIZED, FOR SOLUTION INTRAMUSCULAR; INTRAVENOUS at 15:17

## 2023-07-20 RX ADMIN — SODIUM CHLORIDE 25 ML/HR: 900 INJECTION, SOLUTION INTRAVENOUS at 15:14

## 2023-07-20 ASSESSMENT — PAIN DESCRIPTION - ORIENTATION: ORIENTATION: RIGHT

## 2023-07-20 ASSESSMENT — PAIN SCALES - GENERAL: PAINLEVEL_OUTOF10: 4

## 2023-07-20 ASSESSMENT — PAIN DESCRIPTION - DESCRIPTORS: DESCRIPTORS: ACHING

## 2023-07-20 ASSESSMENT — PAIN DESCRIPTION - LOCATION: LOCATION: WRIST

## 2023-07-21 ENCOUNTER — HOSPITAL ENCOUNTER (OUTPATIENT)
Facility: HOSPITAL | Age: 68
Setting detail: INFUSION SERIES
End: 2023-07-21
Payer: MEDICARE

## 2023-07-21 VITALS
OXYGEN SATURATION: 94 % | RESPIRATION RATE: 18 BRPM | SYSTOLIC BLOOD PRESSURE: 118 MMHG | TEMPERATURE: 97.8 F | HEART RATE: 93 BPM | DIASTOLIC BLOOD PRESSURE: 82 MMHG

## 2023-07-21 DIAGNOSIS — R78.81 BACTEREMIA: Primary | ICD-10-CM

## 2023-07-21 PROCEDURE — 2580000003 HC RX 258: Performed by: NURSE PRACTITIONER

## 2023-07-21 PROCEDURE — 96365 THER/PROPH/DIAG IV INF INIT: CPT

## 2023-07-21 PROCEDURE — 6360000002 HC RX W HCPCS: Performed by: NURSE PRACTITIONER

## 2023-07-21 RX ORDER — HEPARIN 100 UNIT/ML
500 SYRINGE INTRAVENOUS PRN
Status: CANCELLED | OUTPATIENT
Start: 2023-07-22

## 2023-07-21 RX ORDER — ONDANSETRON 2 MG/ML
8 INJECTION INTRAMUSCULAR; INTRAVENOUS
Status: CANCELLED | OUTPATIENT
Start: 2023-07-22

## 2023-07-21 RX ORDER — EPINEPHRINE 1 MG/ML
0.3 INJECTION, SOLUTION, CONCENTRATE INTRAVENOUS PRN
Status: CANCELLED | OUTPATIENT
Start: 2023-07-22

## 2023-07-21 RX ORDER — ALBUTEROL SULFATE 90 UG/1
4 AEROSOL, METERED RESPIRATORY (INHALATION) PRN
Status: CANCELLED | OUTPATIENT
Start: 2023-07-22

## 2023-07-21 RX ORDER — ACETAMINOPHEN 325 MG/1
650 TABLET ORAL
Status: CANCELLED | OUTPATIENT
Start: 2023-07-22

## 2023-07-21 RX ORDER — DIPHENHYDRAMINE HYDROCHLORIDE 50 MG/ML
50 INJECTION INTRAMUSCULAR; INTRAVENOUS
Status: CANCELLED | OUTPATIENT
Start: 2023-07-22

## 2023-07-21 RX ORDER — SODIUM CHLORIDE 0.9 % (FLUSH) 0.9 %
5-40 SYRINGE (ML) INJECTION PRN
Status: DISCONTINUED | OUTPATIENT
Start: 2023-07-21 | End: 2023-07-22 | Stop reason: HOSPADM

## 2023-07-21 RX ORDER — SODIUM CHLORIDE 9 MG/ML
5-250 INJECTION, SOLUTION INTRAVENOUS PRN
Status: CANCELLED | OUTPATIENT
Start: 2023-07-22

## 2023-07-21 RX ORDER — SODIUM CHLORIDE 9 MG/ML
INJECTION, SOLUTION INTRAVENOUS CONTINUOUS
Status: CANCELLED | OUTPATIENT
Start: 2023-07-22

## 2023-07-21 RX ORDER — SODIUM CHLORIDE 0.9 % (FLUSH) 0.9 %
5-40 SYRINGE (ML) INJECTION PRN
Status: CANCELLED | OUTPATIENT
Start: 2023-07-22

## 2023-07-21 RX ADMIN — ERTAPENEM SODIUM 1000 MG: 1 INJECTION, POWDER, LYOPHILIZED, FOR SOLUTION INTRAMUSCULAR; INTRAVENOUS at 08:23

## 2023-07-21 RX ADMIN — Medication 10 ML: at 09:07

## 2023-07-21 RX ADMIN — Medication 10 ML: at 08:16

## 2023-07-21 ASSESSMENT — PAIN DESCRIPTION - DESCRIPTORS: DESCRIPTORS: ACHING;SORE

## 2023-07-21 ASSESSMENT — PAIN DESCRIPTION - ORIENTATION: ORIENTATION: RIGHT

## 2023-07-21 ASSESSMENT — PAIN DESCRIPTION - LOCATION: LOCATION: WRIST

## 2023-07-21 ASSESSMENT — PAIN - FUNCTIONAL ASSESSMENT: PAIN_FUNCTIONAL_ASSESSMENT: ACTIVITIES ARE NOT PREVENTED

## 2023-07-21 ASSESSMENT — PAIN SCALES - GENERAL: PAINLEVEL_OUTOF10: 3

## 2023-07-22 ENCOUNTER — HOSPITAL ENCOUNTER (OUTPATIENT)
Facility: HOSPITAL | Age: 68
Setting detail: INFUSION SERIES
End: 2023-07-22
Payer: MEDICARE

## 2023-07-22 VITALS
OXYGEN SATURATION: 90 % | DIASTOLIC BLOOD PRESSURE: 82 MMHG | TEMPERATURE: 98.1 F | RESPIRATION RATE: 16 BRPM | HEART RATE: 92 BPM | SYSTOLIC BLOOD PRESSURE: 132 MMHG

## 2023-07-22 DIAGNOSIS — R78.81 BACTEREMIA: Primary | ICD-10-CM

## 2023-07-22 PROCEDURE — 6360000002 HC RX W HCPCS: Performed by: NURSE PRACTITIONER

## 2023-07-22 PROCEDURE — 2580000003 HC RX 258: Performed by: NURSE PRACTITIONER

## 2023-07-22 PROCEDURE — 96365 THER/PROPH/DIAG IV INF INIT: CPT

## 2023-07-22 RX ORDER — ALBUTEROL SULFATE 90 UG/1
4 AEROSOL, METERED RESPIRATORY (INHALATION) PRN
Status: CANCELLED | OUTPATIENT
Start: 2023-07-23

## 2023-07-22 RX ORDER — SODIUM CHLORIDE 9 MG/ML
5-250 INJECTION, SOLUTION INTRAVENOUS PRN
Status: CANCELLED | OUTPATIENT
Start: 2023-07-23

## 2023-07-22 RX ORDER — DIPHENHYDRAMINE HYDROCHLORIDE 50 MG/ML
50 INJECTION INTRAMUSCULAR; INTRAVENOUS
Status: CANCELLED | OUTPATIENT
Start: 2023-07-23

## 2023-07-22 RX ORDER — SODIUM CHLORIDE 9 MG/ML
INJECTION, SOLUTION INTRAVENOUS CONTINUOUS
Status: CANCELLED | OUTPATIENT
Start: 2023-07-23

## 2023-07-22 RX ORDER — SODIUM CHLORIDE 0.9 % (FLUSH) 0.9 %
5-40 SYRINGE (ML) INJECTION PRN
Status: CANCELLED | OUTPATIENT
Start: 2023-07-23

## 2023-07-22 RX ORDER — HEPARIN 100 UNIT/ML
500 SYRINGE INTRAVENOUS PRN
Status: CANCELLED | OUTPATIENT
Start: 2023-07-23

## 2023-07-22 RX ORDER — SODIUM CHLORIDE 9 MG/ML
5-250 INJECTION, SOLUTION INTRAVENOUS PRN
Status: DISCONTINUED | OUTPATIENT
Start: 2023-07-22 | End: 2023-07-23 | Stop reason: HOSPADM

## 2023-07-22 RX ORDER — EPINEPHRINE 1 MG/ML
0.3 INJECTION, SOLUTION, CONCENTRATE INTRAVENOUS PRN
Status: CANCELLED | OUTPATIENT
Start: 2023-07-23

## 2023-07-22 RX ORDER — ONDANSETRON 2 MG/ML
8 INJECTION INTRAMUSCULAR; INTRAVENOUS
Status: CANCELLED | OUTPATIENT
Start: 2023-07-23

## 2023-07-22 RX ORDER — SODIUM CHLORIDE 0.9 % (FLUSH) 0.9 %
5-40 SYRINGE (ML) INJECTION PRN
Status: DISCONTINUED | OUTPATIENT
Start: 2023-07-22 | End: 2023-07-23 | Stop reason: HOSPADM

## 2023-07-22 RX ORDER — ACETAMINOPHEN 325 MG/1
650 TABLET ORAL
Status: CANCELLED | OUTPATIENT
Start: 2023-07-23

## 2023-07-22 RX ADMIN — Medication 10 ML: at 10:57

## 2023-07-22 RX ADMIN — SODIUM CHLORIDE 25 ML/HR: 9 INJECTION, SOLUTION INTRAVENOUS at 10:07

## 2023-07-22 RX ADMIN — ERTAPENEM SODIUM 1000 MG: 1 INJECTION INTRAMUSCULAR; INTRAVENOUS at 10:10

## 2023-07-22 ASSESSMENT — PAIN DESCRIPTION - DESCRIPTORS: DESCRIPTORS: ACHING;SORE

## 2023-07-22 ASSESSMENT — PAIN SCALES - GENERAL: PAINLEVEL_OUTOF10: 3

## 2023-07-22 ASSESSMENT — PAIN DESCRIPTION - ORIENTATION: ORIENTATION: RIGHT

## 2023-07-22 ASSESSMENT — PAIN DESCRIPTION - LOCATION: LOCATION: WRIST

## 2023-07-23 ENCOUNTER — HOSPITAL ENCOUNTER (OUTPATIENT)
Facility: HOSPITAL | Age: 68
Setting detail: INFUSION SERIES
End: 2023-07-23
Payer: MEDICARE

## 2023-07-23 VITALS
DIASTOLIC BLOOD PRESSURE: 88 MMHG | TEMPERATURE: 98.2 F | HEART RATE: 98 BPM | RESPIRATION RATE: 16 BRPM | OXYGEN SATURATION: 93 % | SYSTOLIC BLOOD PRESSURE: 134 MMHG

## 2023-07-23 DIAGNOSIS — R78.81 BACTEREMIA: Primary | ICD-10-CM

## 2023-07-23 PROCEDURE — 6360000002 HC RX W HCPCS: Performed by: NURSE PRACTITIONER

## 2023-07-23 PROCEDURE — 96365 THER/PROPH/DIAG IV INF INIT: CPT

## 2023-07-23 PROCEDURE — 2580000003 HC RX 258: Performed by: NURSE PRACTITIONER

## 2023-07-23 RX ORDER — SODIUM CHLORIDE 9 MG/ML
INJECTION, SOLUTION INTRAVENOUS CONTINUOUS
Status: CANCELLED | OUTPATIENT
Start: 2023-07-23

## 2023-07-23 RX ORDER — ONDANSETRON 2 MG/ML
8 INJECTION INTRAMUSCULAR; INTRAVENOUS
Status: CANCELLED | OUTPATIENT
Start: 2023-07-23

## 2023-07-23 RX ORDER — DIPHENHYDRAMINE HYDROCHLORIDE 50 MG/ML
50 INJECTION INTRAMUSCULAR; INTRAVENOUS
Status: CANCELLED | OUTPATIENT
Start: 2023-07-23

## 2023-07-23 RX ORDER — ACETAMINOPHEN 325 MG/1
650 TABLET ORAL
Status: CANCELLED | OUTPATIENT
Start: 2023-07-23

## 2023-07-23 RX ORDER — EPINEPHRINE 1 MG/ML
0.3 INJECTION, SOLUTION, CONCENTRATE INTRAVENOUS PRN
Status: CANCELLED | OUTPATIENT
Start: 2023-07-23

## 2023-07-23 RX ORDER — ALBUTEROL SULFATE 90 UG/1
4 AEROSOL, METERED RESPIRATORY (INHALATION) PRN
Status: CANCELLED | OUTPATIENT
Start: 2023-07-23

## 2023-07-23 RX ORDER — HEPARIN 100 UNIT/ML
500 SYRINGE INTRAVENOUS PRN
Status: CANCELLED | OUTPATIENT
Start: 2023-07-23

## 2023-07-23 RX ORDER — SODIUM CHLORIDE 0.9 % (FLUSH) 0.9 %
5-40 SYRINGE (ML) INJECTION PRN
Status: CANCELLED | OUTPATIENT
Start: 2023-07-23

## 2023-07-23 RX ORDER — SODIUM CHLORIDE 0.9 % (FLUSH) 0.9 %
5-40 SYRINGE (ML) INJECTION PRN
Status: DISCONTINUED | OUTPATIENT
Start: 2023-07-23 | End: 2023-07-24 | Stop reason: HOSPADM

## 2023-07-23 RX ORDER — SODIUM CHLORIDE 9 MG/ML
5-250 INJECTION, SOLUTION INTRAVENOUS PRN
Status: CANCELLED | OUTPATIENT
Start: 2023-07-23

## 2023-07-23 RX ADMIN — ERTAPENEM SODIUM 1000 MG: 1 INJECTION INTRAMUSCULAR; INTRAVENOUS at 09:58

## 2023-07-23 ASSESSMENT — PAIN DESCRIPTION - LOCATION: LOCATION: WRIST

## 2023-07-23 ASSESSMENT — PAIN SCALES - GENERAL: PAINLEVEL_OUTOF10: 2

## 2023-07-23 ASSESSMENT — PAIN DESCRIPTION - ORIENTATION: ORIENTATION: RIGHT

## 2023-07-23 ASSESSMENT — PAIN DESCRIPTION - DESCRIPTORS: DESCRIPTORS: ACHING

## 2023-07-24 ENCOUNTER — HOSPITAL ENCOUNTER (OUTPATIENT)
Facility: HOSPITAL | Age: 68
Setting detail: INFUSION SERIES
End: 2023-07-24
Payer: MEDICARE

## 2023-07-24 VITALS
OXYGEN SATURATION: 98 % | SYSTOLIC BLOOD PRESSURE: 124 MMHG | RESPIRATION RATE: 18 BRPM | HEART RATE: 97 BPM | DIASTOLIC BLOOD PRESSURE: 79 MMHG | TEMPERATURE: 97.7 F

## 2023-07-24 DIAGNOSIS — R78.81 BACTEREMIA: Primary | ICD-10-CM

## 2023-07-24 PROCEDURE — 96365 THER/PROPH/DIAG IV INF INIT: CPT

## 2023-07-24 PROCEDURE — 2580000003 HC RX 258: Performed by: NURSE PRACTITIONER

## 2023-07-24 PROCEDURE — 6360000002 HC RX W HCPCS: Performed by: NURSE PRACTITIONER

## 2023-07-24 RX ORDER — ONDANSETRON 2 MG/ML
8 INJECTION INTRAMUSCULAR; INTRAVENOUS
Status: CANCELLED | OUTPATIENT
Start: 2023-07-24

## 2023-07-24 RX ORDER — SODIUM CHLORIDE 0.9 % (FLUSH) 0.9 %
5-40 SYRINGE (ML) INJECTION PRN
Status: CANCELLED | OUTPATIENT
Start: 2023-07-24

## 2023-07-24 RX ORDER — ACETAMINOPHEN 325 MG/1
650 TABLET ORAL
Status: CANCELLED | OUTPATIENT
Start: 2023-07-24

## 2023-07-24 RX ORDER — HEPARIN 100 UNIT/ML
500 SYRINGE INTRAVENOUS PRN
Status: CANCELLED | OUTPATIENT
Start: 2023-07-24

## 2023-07-24 RX ORDER — DIPHENHYDRAMINE HYDROCHLORIDE 50 MG/ML
50 INJECTION INTRAMUSCULAR; INTRAVENOUS
Status: CANCELLED | OUTPATIENT
Start: 2023-07-24

## 2023-07-24 RX ORDER — SODIUM CHLORIDE 9 MG/ML
5-250 INJECTION, SOLUTION INTRAVENOUS PRN
Status: CANCELLED | OUTPATIENT
Start: 2023-07-24

## 2023-07-24 RX ORDER — ALBUTEROL SULFATE 90 UG/1
4 AEROSOL, METERED RESPIRATORY (INHALATION) PRN
Status: CANCELLED | OUTPATIENT
Start: 2023-07-24

## 2023-07-24 RX ORDER — SODIUM CHLORIDE 9 MG/ML
INJECTION, SOLUTION INTRAVENOUS CONTINUOUS
Status: CANCELLED | OUTPATIENT
Start: 2023-07-24

## 2023-07-24 RX ORDER — EPINEPHRINE 1 MG/ML
0.3 INJECTION, SOLUTION, CONCENTRATE INTRAVENOUS PRN
Status: CANCELLED | OUTPATIENT
Start: 2023-07-24

## 2023-07-24 RX ADMIN — ERTAPENEM SODIUM 1000 MG: 1 INJECTION, POWDER, LYOPHILIZED, FOR SOLUTION INTRAMUSCULAR; INTRAVENOUS at 14:20

## 2023-07-24 ASSESSMENT — PAIN SCALES - GENERAL: PAINLEVEL_OUTOF10: 2

## 2023-07-24 ASSESSMENT — PAIN DESCRIPTION - ORIENTATION: ORIENTATION: RIGHT

## 2023-07-24 ASSESSMENT — PAIN DESCRIPTION - DESCRIPTORS: DESCRIPTORS: ACHING

## 2023-07-24 ASSESSMENT — PAIN DESCRIPTION - LOCATION: LOCATION: WRIST

## 2023-07-25 ENCOUNTER — HOSPITAL ENCOUNTER (OUTPATIENT)
Facility: HOSPITAL | Age: 68
Setting detail: INFUSION SERIES
End: 2023-07-25
Payer: MEDICARE

## 2023-07-25 VITALS
RESPIRATION RATE: 18 BRPM | HEART RATE: 96 BPM | TEMPERATURE: 97.9 F | SYSTOLIC BLOOD PRESSURE: 115 MMHG | DIASTOLIC BLOOD PRESSURE: 82 MMHG

## 2023-07-25 DIAGNOSIS — R78.81 BACTEREMIA: Primary | ICD-10-CM

## 2023-07-25 PROCEDURE — 2580000003 HC RX 258: Performed by: NURSE PRACTITIONER

## 2023-07-25 PROCEDURE — 6360000002 HC RX W HCPCS: Performed by: NURSE PRACTITIONER

## 2023-07-25 PROCEDURE — 96365 THER/PROPH/DIAG IV INF INIT: CPT

## 2023-07-25 RX ORDER — HEPARIN 100 UNIT/ML
500 SYRINGE INTRAVENOUS PRN
OUTPATIENT
Start: 2023-07-26

## 2023-07-25 RX ORDER — EPINEPHRINE 1 MG/ML
0.3 INJECTION, SOLUTION, CONCENTRATE INTRAVENOUS PRN
OUTPATIENT
Start: 2023-07-26

## 2023-07-25 RX ORDER — ALBUTEROL SULFATE 90 UG/1
4 AEROSOL, METERED RESPIRATORY (INHALATION) PRN
OUTPATIENT
Start: 2023-07-26

## 2023-07-25 RX ORDER — SODIUM CHLORIDE 9 MG/ML
5-250 INJECTION, SOLUTION INTRAVENOUS PRN
OUTPATIENT
Start: 2023-07-26

## 2023-07-25 RX ORDER — ACETAMINOPHEN 325 MG/1
650 TABLET ORAL
OUTPATIENT
Start: 2023-07-26

## 2023-07-25 RX ORDER — ONDANSETRON 2 MG/ML
8 INJECTION INTRAMUSCULAR; INTRAVENOUS
OUTPATIENT
Start: 2023-07-26

## 2023-07-25 RX ORDER — SODIUM CHLORIDE 0.9 % (FLUSH) 0.9 %
5-40 SYRINGE (ML) INJECTION PRN
Status: DISCONTINUED | OUTPATIENT
Start: 2023-07-25 | End: 2023-07-26 | Stop reason: HOSPADM

## 2023-07-25 RX ORDER — SODIUM CHLORIDE 9 MG/ML
INJECTION, SOLUTION INTRAVENOUS CONTINUOUS
OUTPATIENT
Start: 2023-07-26

## 2023-07-25 RX ORDER — DIPHENHYDRAMINE HYDROCHLORIDE 50 MG/ML
50 INJECTION INTRAMUSCULAR; INTRAVENOUS
OUTPATIENT
Start: 2023-07-26

## 2023-07-25 RX ORDER — SODIUM CHLORIDE 0.9 % (FLUSH) 0.9 %
5-40 SYRINGE (ML) INJECTION PRN
Status: CANCELLED | OUTPATIENT
Start: 2023-07-26

## 2023-07-25 RX ADMIN — Medication 10 ML: at 11:16

## 2023-07-25 RX ADMIN — Medication 10 ML: at 10:14

## 2023-07-25 RX ADMIN — ERTAPENEM SODIUM 1000 MG: 1 INJECTION, POWDER, LYOPHILIZED, FOR SOLUTION INTRAMUSCULAR; INTRAVENOUS at 10:33

## 2023-07-25 ASSESSMENT — PAIN SCALES - GENERAL: PAINLEVEL_OUTOF10: 1

## 2023-07-26 ENCOUNTER — HOSPITAL ENCOUNTER (OUTPATIENT)
Facility: HOSPITAL | Age: 68
Setting detail: INFUSION SERIES
End: 2023-07-26
Payer: MEDICARE

## 2023-07-26 VITALS
SYSTOLIC BLOOD PRESSURE: 111 MMHG | RESPIRATION RATE: 18 BRPM | HEART RATE: 98 BPM | TEMPERATURE: 98 F | OXYGEN SATURATION: 100 % | DIASTOLIC BLOOD PRESSURE: 81 MMHG

## 2023-07-26 DIAGNOSIS — R78.81 BACTEREMIA: Primary | ICD-10-CM

## 2023-07-26 PROCEDURE — 6360000002 HC RX W HCPCS: Performed by: NURSE PRACTITIONER

## 2023-07-26 PROCEDURE — 2580000003 HC RX 258: Performed by: NURSE PRACTITIONER

## 2023-07-26 PROCEDURE — 96365 THER/PROPH/DIAG IV INF INIT: CPT

## 2023-07-26 RX ORDER — DIPHENHYDRAMINE HYDROCHLORIDE 50 MG/ML
50 INJECTION INTRAMUSCULAR; INTRAVENOUS
OUTPATIENT
Start: 2023-07-26

## 2023-07-26 RX ORDER — SODIUM CHLORIDE 0.9 % (FLUSH) 0.9 %
5-40 SYRINGE (ML) INJECTION PRN
OUTPATIENT
Start: 2023-07-26

## 2023-07-26 RX ORDER — EPINEPHRINE 1 MG/ML
0.3 INJECTION, SOLUTION, CONCENTRATE INTRAVENOUS PRN
OUTPATIENT
Start: 2023-07-26

## 2023-07-26 RX ORDER — HEPARIN 100 UNIT/ML
500 SYRINGE INTRAVENOUS PRN
OUTPATIENT
Start: 2023-07-26

## 2023-07-26 RX ORDER — ALBUTEROL SULFATE 90 UG/1
4 AEROSOL, METERED RESPIRATORY (INHALATION) PRN
OUTPATIENT
Start: 2023-07-26

## 2023-07-26 RX ORDER — SODIUM CHLORIDE 9 MG/ML
5-250 INJECTION, SOLUTION INTRAVENOUS PRN
OUTPATIENT
Start: 2023-07-26

## 2023-07-26 RX ORDER — ACETAMINOPHEN 325 MG/1
650 TABLET ORAL
OUTPATIENT
Start: 2023-07-26

## 2023-07-26 RX ORDER — SODIUM CHLORIDE 9 MG/ML
INJECTION, SOLUTION INTRAVENOUS CONTINUOUS
OUTPATIENT
Start: 2023-07-26

## 2023-07-26 RX ORDER — SODIUM CHLORIDE 0.9 % (FLUSH) 0.9 %
5-40 SYRINGE (ML) INJECTION PRN
Status: DISCONTINUED | OUTPATIENT
Start: 2023-07-26 | End: 2023-07-27 | Stop reason: HOSPADM

## 2023-07-26 RX ORDER — ONDANSETRON 2 MG/ML
8 INJECTION INTRAMUSCULAR; INTRAVENOUS
OUTPATIENT
Start: 2023-07-26

## 2023-07-26 RX ADMIN — ERTAPENEM SODIUM 1000 MG: 1 INJECTION INTRAMUSCULAR; INTRAVENOUS at 09:21

## 2023-07-26 RX ADMIN — Medication 10 ML: at 10:06

## 2023-07-26 RX ADMIN — Medication 10 ML: at 09:19

## 2023-07-26 ASSESSMENT — PAIN SCALES - GENERAL: PAINLEVEL_OUTOF10: 1

## 2023-07-26 ASSESSMENT — PAIN - FUNCTIONAL ASSESSMENT: PAIN_FUNCTIONAL_ASSESSMENT: ACTIVITIES ARE NOT PREVENTED

## 2023-07-26 ASSESSMENT — PAIN DESCRIPTION - LOCATION: LOCATION: WRIST

## 2023-07-26 ASSESSMENT — PAIN DESCRIPTION - ORIENTATION: ORIENTATION: RIGHT

## 2023-07-26 ASSESSMENT — PAIN DESCRIPTION - DESCRIPTORS: DESCRIPTORS: ACHING

## 2023-08-29 DIAGNOSIS — I10 HYPERTENSION, UNSPECIFIED TYPE: Primary | ICD-10-CM

## 2023-08-29 RX ORDER — LISINOPRIL 40 MG/1
40 TABLET ORAL DAILY
Qty: 30 TABLET | Refills: 2 | Status: SHIPPED | OUTPATIENT
Start: 2023-08-29

## 2023-08-29 NOTE — TELEPHONE ENCOUNTER
----- Message from Jasmin Buck sent at 8/29/2023  9:17 AM EDT -----  Subject: Refill Request    QUESTIONS  Name of Medication? lisinopril (PRINIVIL;ZESTRIL) 40 MG tablet  Patient-reported dosage and instructions? Take 1 tablet by mouth daily  How many days do you have left? 9  Preferred Pharmacy? [address] [id number]  Pharmacy phone number (if available)? [phone]  ---------------------------------------------------------------------------  --------------  CALL BACK INFO  What is the best way for the office to contact you? OK to leave message on   voicemail  Preferred Call Back Phone Number? [phone]  ---------------------------------------------------------------------------  --------------  SCRIPT ANSWERS  Relationship to Patient?  Self

## 2023-08-29 NOTE — TELEPHONE ENCOUNTER
PCP: Vinod Clifton MD    Last appt: 5/31/2023  Future Appointments   Date Time Provider [address]   9/5/2023  7:45 AM Vinod Clifton MD [facility] BS AMB       Requested Prescriptions     Pending Prescriptions Disp Refills    lisinopril (PRINIVIL;ZESTRIL) 40 MG tablet 30 tablet 2     Sig: Take 1 tablet by mouth daily

## 2023-09-12 ENCOUNTER — OFFICE VISIT (OUTPATIENT)
Age: 68
End: 2023-09-12
Payer: MEDICARE

## 2023-09-12 VITALS
HEART RATE: 104 BPM | DIASTOLIC BLOOD PRESSURE: 73 MMHG | TEMPERATURE: 98.2 F | OXYGEN SATURATION: 96 % | WEIGHT: 220.8 LBS | HEIGHT: 71 IN | BODY MASS INDEX: 30.91 KG/M2 | RESPIRATION RATE: 14 BRPM | SYSTOLIC BLOOD PRESSURE: 111 MMHG

## 2023-09-12 DIAGNOSIS — N18.31 TYPE 2 DIABETES MELLITUS WITH STAGE 3A CHRONIC KIDNEY DISEASE, WITHOUT LONG-TERM CURRENT USE OF INSULIN (HCC): Primary | ICD-10-CM

## 2023-09-12 DIAGNOSIS — N18.31 STAGE 3A CHRONIC KIDNEY DISEASE (HCC): ICD-10-CM

## 2023-09-12 DIAGNOSIS — D64.9 ANEMIA, UNSPECIFIED TYPE: ICD-10-CM

## 2023-09-12 DIAGNOSIS — G47.33 OSA (OBSTRUCTIVE SLEEP APNEA): ICD-10-CM

## 2023-09-12 DIAGNOSIS — M86.60 CHRONIC OSTEOMYELITIS (HCC): ICD-10-CM

## 2023-09-12 DIAGNOSIS — E78.00 HYPERCHOLESTEROLEMIA: ICD-10-CM

## 2023-09-12 DIAGNOSIS — E11.22 TYPE 2 DIABETES MELLITUS WITH STAGE 3A CHRONIC KIDNEY DISEASE, WITHOUT LONG-TERM CURRENT USE OF INSULIN (HCC): Primary | ICD-10-CM

## 2023-09-12 DIAGNOSIS — E11.51 TYPE 2 DIABETES MELLITUS WITH DIABETIC PERIPHERAL ANGIOPATHY WITHOUT GANGRENE, WITHOUT LONG-TERM CURRENT USE OF INSULIN (HCC): ICD-10-CM

## 2023-09-12 DIAGNOSIS — M1A.9XX1 CHRONIC TOPHACEOUS GOUT: ICD-10-CM

## 2023-09-12 DIAGNOSIS — E78.1 HYPERTRIGLYCERIDEMIA: ICD-10-CM

## 2023-09-12 DIAGNOSIS — I10 PRIMARY HYPERTENSION: ICD-10-CM

## 2023-09-12 DIAGNOSIS — Z89.429 HISTORY OF AMPUTATION OF TOE (HCC): ICD-10-CM

## 2023-09-12 PROBLEM — N18.30 CHRONIC RENAL DISEASE, STAGE III (HCC): Status: ACTIVE | Noted: 2023-03-24

## 2023-09-12 PROBLEM — E11.9 TYPE II DIABETES MELLITUS (HCC): Status: ACTIVE | Noted: 2023-03-23

## 2023-09-12 PROCEDURE — 3017F COLORECTAL CA SCREEN DOC REV: CPT | Performed by: STUDENT IN AN ORGANIZED HEALTH CARE EDUCATION/TRAINING PROGRAM

## 2023-09-12 PROCEDURE — G8427 DOCREV CUR MEDS BY ELIG CLIN: HCPCS | Performed by: STUDENT IN AN ORGANIZED HEALTH CARE EDUCATION/TRAINING PROGRAM

## 2023-09-12 PROCEDURE — 99214 OFFICE O/P EST MOD 30 MIN: CPT | Performed by: STUDENT IN AN ORGANIZED HEALTH CARE EDUCATION/TRAINING PROGRAM

## 2023-09-12 PROCEDURE — G8417 CALC BMI ABV UP PARAM F/U: HCPCS | Performed by: STUDENT IN AN ORGANIZED HEALTH CARE EDUCATION/TRAINING PROGRAM

## 2023-09-12 PROCEDURE — 1123F ACP DISCUSS/DSCN MKR DOCD: CPT | Performed by: STUDENT IN AN ORGANIZED HEALTH CARE EDUCATION/TRAINING PROGRAM

## 2023-09-12 PROCEDURE — 3078F DIAST BP <80 MM HG: CPT | Performed by: STUDENT IN AN ORGANIZED HEALTH CARE EDUCATION/TRAINING PROGRAM

## 2023-09-12 PROCEDURE — 2022F DILAT RTA XM EVC RTNOPTHY: CPT | Performed by: STUDENT IN AN ORGANIZED HEALTH CARE EDUCATION/TRAINING PROGRAM

## 2023-09-12 PROCEDURE — 3046F HEMOGLOBIN A1C LEVEL >9.0%: CPT | Performed by: STUDENT IN AN ORGANIZED HEALTH CARE EDUCATION/TRAINING PROGRAM

## 2023-09-12 PROCEDURE — 1036F TOBACCO NON-USER: CPT | Performed by: STUDENT IN AN ORGANIZED HEALTH CARE EDUCATION/TRAINING PROGRAM

## 2023-09-12 PROCEDURE — 3074F SYST BP LT 130 MM HG: CPT | Performed by: STUDENT IN AN ORGANIZED HEALTH CARE EDUCATION/TRAINING PROGRAM

## 2023-09-12 RX ORDER — DULAGLUTIDE 3 MG/.5ML
3 INJECTION, SOLUTION SUBCUTANEOUS WEEKLY
Qty: 2 ML | Refills: 3 | Status: SHIPPED | OUTPATIENT
Start: 2023-09-12

## 2023-09-12 SDOH — ECONOMIC STABILITY: INCOME INSECURITY: HOW HARD IS IT FOR YOU TO PAY FOR THE VERY BASICS LIKE FOOD, HOUSING, MEDICAL CARE, AND HEATING?: NOT HARD AT ALL

## 2023-09-12 SDOH — ECONOMIC STABILITY: FOOD INSECURITY: WITHIN THE PAST 12 MONTHS, YOU WORRIED THAT YOUR FOOD WOULD RUN OUT BEFORE YOU GOT MONEY TO BUY MORE.: NEVER TRUE

## 2023-09-12 SDOH — ECONOMIC STABILITY: HOUSING INSECURITY
IN THE LAST 12 MONTHS, WAS THERE A TIME WHEN YOU DID NOT HAVE A STEADY PLACE TO SLEEP OR SLEPT IN A SHELTER (INCLUDING NOW)?: NO

## 2023-09-12 SDOH — ECONOMIC STABILITY: FOOD INSECURITY: WITHIN THE PAST 12 MONTHS, THE FOOD YOU BOUGHT JUST DIDN'T LAST AND YOU DIDN'T HAVE MONEY TO GET MORE.: NEVER TRUE

## 2023-09-12 ASSESSMENT — PATIENT HEALTH QUESTIONNAIRE - PHQ9
SUM OF ALL RESPONSES TO PHQ9 QUESTIONS 1 & 2: 0
SUM OF ALL RESPONSES TO PHQ QUESTIONS 1-9: 0
SUM OF ALL RESPONSES TO PHQ QUESTIONS 1-9: 0
2. FEELING DOWN, DEPRESSED OR HOPELESS: 0
SUM OF ALL RESPONSES TO PHQ QUESTIONS 1-9: 0
1. LITTLE INTEREST OR PLEASURE IN DOING THINGS: 0
SUM OF ALL RESPONSES TO PHQ QUESTIONS 1-9: 0

## 2023-09-12 NOTE — ASSESSMENT & PLAN NOTE
Remained uncontrolled on fenofibrate, and now he stopped taking the fenofibrate. Will check again today.

## 2023-09-12 NOTE — ASSESSMENT & PLAN NOTE
Advised increasing trulicity but he would like to hold off on increasing for the time being as he would like to take as little medication as possible.

## 2023-09-12 NOTE — PROGRESS NOTES
Chief Complaint   Patient presents with    Diabetes     2 month follow up          1. Have you been to the ER, urgent care clinic since your last visit? Hospitalized since your last visit? No    2. Have you seen or consulted any other health care providers outside of the [address] [place] [address] since your last visit? Include any pap smears or colon screening.  No

## 2023-09-28 ENCOUNTER — OFFICE VISIT (OUTPATIENT)
Age: 68
End: 2023-09-28
Payer: MEDICARE

## 2023-09-28 VITALS
WEIGHT: 220 LBS | HEIGHT: 71 IN | HEART RATE: 88 BPM | SYSTOLIC BLOOD PRESSURE: 138 MMHG | TEMPERATURE: 97.4 F | OXYGEN SATURATION: 98 % | RESPIRATION RATE: 14 BRPM | BODY MASS INDEX: 30.8 KG/M2 | DIASTOLIC BLOOD PRESSURE: 87 MMHG

## 2023-09-28 DIAGNOSIS — G51.0 BELL'S PALSY: Primary | ICD-10-CM

## 2023-09-28 PROCEDURE — 1123F ACP DISCUSS/DSCN MKR DOCD: CPT | Performed by: STUDENT IN AN ORGANIZED HEALTH CARE EDUCATION/TRAINING PROGRAM

## 2023-09-28 PROCEDURE — 3017F COLORECTAL CA SCREEN DOC REV: CPT | Performed by: STUDENT IN AN ORGANIZED HEALTH CARE EDUCATION/TRAINING PROGRAM

## 2023-09-28 PROCEDURE — G8427 DOCREV CUR MEDS BY ELIG CLIN: HCPCS | Performed by: STUDENT IN AN ORGANIZED HEALTH CARE EDUCATION/TRAINING PROGRAM

## 2023-09-28 PROCEDURE — G8417 CALC BMI ABV UP PARAM F/U: HCPCS | Performed by: STUDENT IN AN ORGANIZED HEALTH CARE EDUCATION/TRAINING PROGRAM

## 2023-09-28 PROCEDURE — 99213 OFFICE O/P EST LOW 20 MIN: CPT | Performed by: STUDENT IN AN ORGANIZED HEALTH CARE EDUCATION/TRAINING PROGRAM

## 2023-09-28 PROCEDURE — 3075F SYST BP GE 130 - 139MM HG: CPT | Performed by: STUDENT IN AN ORGANIZED HEALTH CARE EDUCATION/TRAINING PROGRAM

## 2023-09-28 PROCEDURE — 1036F TOBACCO NON-USER: CPT | Performed by: STUDENT IN AN ORGANIZED HEALTH CARE EDUCATION/TRAINING PROGRAM

## 2023-09-28 PROCEDURE — 3079F DIAST BP 80-89 MM HG: CPT | Performed by: STUDENT IN AN ORGANIZED HEALTH CARE EDUCATION/TRAINING PROGRAM

## 2023-09-28 RX ORDER — ACYCLOVIR 400 MG/1
400 TABLET ORAL 2 TIMES DAILY
COMMUNITY
Start: 2023-09-22 | End: 2023-10-03

## 2023-09-28 RX ORDER — DIPHENHYDRAMINE HCL 25 MG
1 CAPSULE ORAL 4 TIMES DAILY
Qty: 30 ML | Refills: 0 | Status: SHIPPED | OUTPATIENT
Start: 2023-09-28

## 2023-09-28 RX ORDER — PREDNISONE 20 MG/1
60 TABLET ORAL DAILY
Qty: 21 TABLET | Refills: 0 | Status: SHIPPED | OUTPATIENT
Start: 2023-09-28 | End: 2023-10-05

## 2023-09-28 NOTE — PROGRESS NOTES
Chief Complaint   Patient presents with    ED Follow-up     Bell's palsy, 9/23/23      1. Have you been to the ER, urgent care clinic since your last visit? Hospitalized since your last visit? Yes 9/23/23 urgent care visit for Bell's palsy in [place]    2. Have you seen or consulted any other health care providers outside of the [address] [place] [address] since your last visit? Include any pap smears or colon screening.  No

## 2023-09-28 NOTE — ASSESSMENT & PLAN NOTE
Unimproved in the last week. - continue acyclovir  - start prednisone 60 mg daily for 7 days. Advised pt to more closely monitor BG while on prednisone.   - advised to tape eyelid shut at night  - apply artificial tears 4x daily  - follow up if not improving over next 3 weeks.

## 2023-09-28 NOTE — PROGRESS NOTES
HISTORY OF PRESENT ILLNESS   Idania Weller is a 79 y.o. male who  has a past medical history of Chronic tophaceous gout, Diabetes ([address]), and Hypertension. Pt presents for hospital follow up. Pt dx'd with [place] Palsy. Sx started on 9/21, went to  and ED a few days later and dx'd with [place] palsy after CT and brain MRI r/o'd out stroke. He does not have any other neuro deficits, no weakness or numbness. He has not had any other symptoms including pain or rash. He was started on acyclovir but no steroids, he has not been using artificial tears. BG typically running low 100's. 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    Serum ammonia increased ([address]) 03/23/2023    Hypertension 03/23/2023    Type II diabetes mellitus ([address]) 03/23/2023    Hypercholesterolemia 03/23/2023    Chronic renal disease, stage III ([address]) 03/24/2023    History of amputation of toe ([address]) 04/09/2023    Bacteremia 07/19/2023    BOGDAN (obstructive sleep apnea) 09/12/2023    Hypertriglyceridemia 09/12/2023    Chronic osteomyelitis ([address]) 09/12/2023     Resolved Ambulatory Problems     Diagnosis Date Noted    Encounter for long-term (current) use of other medications 05/04/2012    Knee pain 05/03/2012    Finger pain 05/03/2012    Acute hepatic encephalopathy ([address]) 01/16/2023    Type 2 diabetes mellitus with hyperosmolar nonketotic hyperglycemia ([address]) 01/17/2023     Past Medical History:   Diagnosis Date    Diabetes ([address])          SOCIAL HISTORY:    There were no vitals taken for this visit. Physical Exam  Constitutional:       General: He is not in acute distress. Appearance: Normal appearance. He is not toxic-appearing. HENT:      Head: Normocephalic and atraumatic. Mouth/Throat:      Pharynx: No posterior oropharyngeal erythema. Eyes:      General: No scleral icterus. Extraocular Movements: Extraocular movements intact.       Conjunctiva/sclera:      Left eye:

## 2023-12-06 DIAGNOSIS — I10 HYPERTENSION, UNSPECIFIED TYPE: ICD-10-CM

## 2023-12-06 RX ORDER — LISINOPRIL 40 MG/1
40 TABLET ORAL DAILY
Qty: 30 TABLET | Refills: 2 | Status: SHIPPED | OUTPATIENT
Start: 2023-12-06

## 2024-01-05 ENCOUNTER — OFFICE VISIT (OUTPATIENT)
Age: 69
End: 2024-01-05
Payer: MEDICARE

## 2024-01-05 VITALS
HEIGHT: 71 IN | OXYGEN SATURATION: 98 % | HEART RATE: 99 BPM | TEMPERATURE: 97 F | DIASTOLIC BLOOD PRESSURE: 87 MMHG | RESPIRATION RATE: 16 BRPM | SYSTOLIC BLOOD PRESSURE: 129 MMHG | WEIGHT: 226.6 LBS | BODY MASS INDEX: 31.72 KG/M2

## 2024-01-05 DIAGNOSIS — E11.51 TYPE 2 DIABETES MELLITUS WITH DIABETIC PERIPHERAL ANGIOPATHY WITHOUT GANGRENE, WITHOUT LONG-TERM CURRENT USE OF INSULIN (HCC): ICD-10-CM

## 2024-01-05 DIAGNOSIS — E11.22 TYPE 2 DIABETES MELLITUS WITH STAGE 3A CHRONIC KIDNEY DISEASE, WITHOUT LONG-TERM CURRENT USE OF INSULIN (HCC): ICD-10-CM

## 2024-01-05 DIAGNOSIS — Z89.429 HISTORY OF AMPUTATION OF TOE (HCC): ICD-10-CM

## 2024-01-05 DIAGNOSIS — I10 PRIMARY HYPERTENSION: Primary | ICD-10-CM

## 2024-01-05 DIAGNOSIS — E78.00 HYPERCHOLESTEROLEMIA: ICD-10-CM

## 2024-01-05 DIAGNOSIS — N18.31 TYPE 2 DIABETES MELLITUS WITH STAGE 3A CHRONIC KIDNEY DISEASE, WITHOUT LONG-TERM CURRENT USE OF INSULIN (HCC): ICD-10-CM

## 2024-01-05 DIAGNOSIS — Z12.5 SPECIAL SCREENING FOR MALIGNANT NEOPLASM OF PROSTATE: ICD-10-CM

## 2024-01-05 DIAGNOSIS — D64.9 ANEMIA, UNSPECIFIED TYPE: ICD-10-CM

## 2024-01-05 LAB
ALBUMIN SERPL-MCNC: 3.8 G/DL (ref 3.5–5)
ALBUMIN/GLOB SERPL: 1 (ref 1.1–2.2)
ALP SERPL-CCNC: 77 U/L (ref 45–117)
ALT SERPL-CCNC: 31 U/L (ref 12–78)
ANION GAP SERPL CALC-SCNC: 6 MMOL/L (ref 5–15)
AST SERPL-CCNC: 19 U/L (ref 15–37)
BASOPHILS # BLD: 0.1 K/UL (ref 0–0.1)
BASOPHILS NFR BLD: 1 % (ref 0–1)
BILIRUB SERPL-MCNC: 0.6 MG/DL (ref 0.2–1)
BUN SERPL-MCNC: 22 MG/DL (ref 6–20)
BUN/CREAT SERPL: 15 (ref 12–20)
CALCIUM SERPL-MCNC: 9.7 MG/DL (ref 8.5–10.1)
CHLORIDE SERPL-SCNC: 109 MMOL/L (ref 97–108)
CHOLEST SERPL-MCNC: 242 MG/DL
CO2 SERPL-SCNC: 26 MMOL/L (ref 21–32)
CREAT SERPL-MCNC: 1.45 MG/DL (ref 0.7–1.3)
CREAT UR-MCNC: 165 MG/DL
DIFFERENTIAL METHOD BLD: ABNORMAL
EOSINOPHIL # BLD: 0.5 K/UL (ref 0–0.4)
EOSINOPHIL NFR BLD: 6 % (ref 0–7)
ERYTHROCYTE [DISTWIDTH] IN BLOOD BY AUTOMATED COUNT: 14 % (ref 11.5–14.5)
EST. AVERAGE GLUCOSE BLD GHB EST-MCNC: 171 MG/DL
GLOBULIN SER CALC-MCNC: 4 G/DL (ref 2–4)
GLUCOSE SERPL-MCNC: 141 MG/DL (ref 65–100)
HBA1C MFR BLD: 7.6 % (ref 4–5.6)
HCT VFR BLD AUTO: 48.3 % (ref 36.6–50.3)
HDLC SERPL-MCNC: 28 MG/DL
HDLC SERPL: 8.6 (ref 0–5)
HGB BLD-MCNC: 15.6 G/DL (ref 12.1–17)
IMM GRANULOCYTES # BLD AUTO: 0.1 K/UL (ref 0–0.04)
IMM GRANULOCYTES NFR BLD AUTO: 1 % (ref 0–0.5)
LDLC SERPL CALC-MCNC: ABNORMAL MG/DL (ref 0–100)
LDLC SERPL DIRECT ASSAY-MCNC: 93 MG/DL (ref 0–100)
LYMPHOCYTES # BLD: 1.7 K/UL (ref 0.8–3.5)
LYMPHOCYTES NFR BLD: 20 % (ref 12–49)
MCH RBC QN AUTO: 28 PG (ref 26–34)
MCHC RBC AUTO-ENTMCNC: 32.3 G/DL (ref 30–36.5)
MCV RBC AUTO: 86.7 FL (ref 80–99)
MICROALBUMIN UR-MCNC: 7.08 MG/DL
MICROALBUMIN/CREAT UR-RTO: 43 MG/G (ref 0–30)
MONOCYTES # BLD: 0.8 K/UL (ref 0–1)
MONOCYTES NFR BLD: 10 % (ref 5–13)
NEUTS SEG # BLD: 5.3 K/UL (ref 1.8–8)
NEUTS SEG NFR BLD: 62 % (ref 32–75)
NRBC # BLD: 0 K/UL (ref 0–0.01)
NRBC BLD-RTO: 0 PER 100 WBC
PLATELET # BLD AUTO: 314 K/UL (ref 150–400)
PMV BLD AUTO: 11.1 FL (ref 8.9–12.9)
POTASSIUM SERPL-SCNC: 4.7 MMOL/L (ref 3.5–5.1)
PROT SERPL-MCNC: 7.8 G/DL (ref 6.4–8.2)
PSA SERPL-MCNC: 1.8 NG/ML (ref 0.01–4)
RBC # BLD AUTO: 5.57 M/UL (ref 4.1–5.7)
SODIUM SERPL-SCNC: 141 MMOL/L (ref 136–145)
TRIGL SERPL-MCNC: 875 MG/DL
VLDLC SERPL CALC-MCNC: ABNORMAL MG/DL
WBC # BLD AUTO: 8.4 K/UL (ref 4.1–11.1)

## 2024-01-05 PROCEDURE — G8484 FLU IMMUNIZE NO ADMIN: HCPCS | Performed by: STUDENT IN AN ORGANIZED HEALTH CARE EDUCATION/TRAINING PROGRAM

## 2024-01-05 PROCEDURE — 2022F DILAT RTA XM EVC RTNOPTHY: CPT | Performed by: STUDENT IN AN ORGANIZED HEALTH CARE EDUCATION/TRAINING PROGRAM

## 2024-01-05 PROCEDURE — 99214 OFFICE O/P EST MOD 30 MIN: CPT | Performed by: STUDENT IN AN ORGANIZED HEALTH CARE EDUCATION/TRAINING PROGRAM

## 2024-01-05 PROCEDURE — 1036F TOBACCO NON-USER: CPT | Performed by: STUDENT IN AN ORGANIZED HEALTH CARE EDUCATION/TRAINING PROGRAM

## 2024-01-05 PROCEDURE — 3074F SYST BP LT 130 MM HG: CPT | Performed by: STUDENT IN AN ORGANIZED HEALTH CARE EDUCATION/TRAINING PROGRAM

## 2024-01-05 PROCEDURE — 3046F HEMOGLOBIN A1C LEVEL >9.0%: CPT | Performed by: STUDENT IN AN ORGANIZED HEALTH CARE EDUCATION/TRAINING PROGRAM

## 2024-01-05 PROCEDURE — G8417 CALC BMI ABV UP PARAM F/U: HCPCS | Performed by: STUDENT IN AN ORGANIZED HEALTH CARE EDUCATION/TRAINING PROGRAM

## 2024-01-05 PROCEDURE — 3017F COLORECTAL CA SCREEN DOC REV: CPT | Performed by: STUDENT IN AN ORGANIZED HEALTH CARE EDUCATION/TRAINING PROGRAM

## 2024-01-05 PROCEDURE — 3079F DIAST BP 80-89 MM HG: CPT | Performed by: STUDENT IN AN ORGANIZED HEALTH CARE EDUCATION/TRAINING PROGRAM

## 2024-01-05 PROCEDURE — 1123F ACP DISCUSS/DSCN MKR DOCD: CPT | Performed by: STUDENT IN AN ORGANIZED HEALTH CARE EDUCATION/TRAINING PROGRAM

## 2024-01-05 PROCEDURE — G8427 DOCREV CUR MEDS BY ELIG CLIN: HCPCS | Performed by: STUDENT IN AN ORGANIZED HEALTH CARE EDUCATION/TRAINING PROGRAM

## 2024-01-05 ASSESSMENT — PATIENT HEALTH QUESTIONNAIRE - PHQ9
2. FEELING DOWN, DEPRESSED OR HOPELESS: 0
SUM OF ALL RESPONSES TO PHQ QUESTIONS 1-9: 0
SUM OF ALL RESPONSES TO PHQ QUESTIONS 1-9: 0
1. LITTLE INTEREST OR PLEASURE IN DOING THINGS: 0
SUM OF ALL RESPONSES TO PHQ QUESTIONS 1-9: 0
SUM OF ALL RESPONSES TO PHQ9 QUESTIONS 1 & 2: 0
SUM OF ALL RESPONSES TO PHQ QUESTIONS 1-9: 0

## 2024-01-05 NOTE — PROGRESS NOTES
HISTORY OF PRESENT ILLNESS   Yohan Lincoln is a 68 y.o. male who  has a past medical history of Chronic tophaceous gout, Diabetes (HCC), and Hypertension.     Pt presents for follow up for dm and hld.    HTN: on lisinopril 40 mg     DM2: RECALL at last visit discontinued glipizde and increased trulicity to 3.0 mg weekly. A1c 8.3% in March 2023. At 9/12/23 appt reports BG typically running around 110.   - A1c goal:   - Home glucose checks: around 150 about 1 wk ago. Not checking regularly.   - foot exam:   - ophtho:   - microalbumin:   - BP goal < 130/80: near goal  - LDL goal < 70 (based on most recent guidelines):   - ACEi/ARB: yes  - SGLT2i:   - statin: refused    BOGDAN: RECALL pt referred to sleep medicine for cpap.      Dyslipidemia: , HDL 33,  on 3/1/23. After previous discussion, pt refused to start statin. On fenofibrate,   The 10-year ASCVD risk score (Olga VÁZQUEZ, et al., 2019) is: 35.8%    Values used to calculate the score:      Age: 68 years      Sex: Male      Is Non- : No      Diabetic: Yes      Tobacco smoker: No      Systolic Blood Pressure: 129 mmHg      Is BP treated: Yes      HDL Cholesterol: 33 MG/DL      Total Cholesterol: 209 MG/DL    [place] palsy:    Gout on febuxostat.     HM:  Flu shot:  refused  Covid: refused  PNA: refused  Tdap: refused  Shingles: refused  RSV: refused  Hep C Screen:   HIV screen:  LDCT: NA  AAA: NA  PSA: 1/5/23  Colonoscopy: 11/6/23, repeat 5 yrs  dentist:  MADIHAV:           SOCIAL HISTORY:    /87   Pulse 99   Temp 97 °F (36.1 °C) (Temporal)   Resp 16   Ht 1.803 m (5' 11\")   Wt 102.8 kg (226 lb 9.6 oz)   SpO2 98%   BMI 31.60 kg/m²     Physical Exam  Constitutional:       General: He is not in acute distress.     Appearance: Normal appearance. He is not toxic-appearing.   HENT:      Head: Normocephalic and atraumatic.      Mouth/Throat:      Mouth: Mucous membranes are moist.      Pharynx: No posterior oropharyngeal erythema.

## 2024-01-05 NOTE — PROGRESS NOTES
Chief Complaint   Patient presents with    Diabetes     3 month follow up         \"Have you been to the ER, urgent care clinic since your last visit?  Hospitalized since your last visit?\"    NO    “Have you seen or consulted any other health care providers outside of [facility] since your last visit?”    NO

## 2024-01-17 ENCOUNTER — TELEPHONE (OUTPATIENT)
Age: 69
End: 2024-01-17

## 2024-01-17 NOTE — TELEPHONE ENCOUNTER
He's does not NEED it for anything just wants an AWV - called him to confirm but just wanted to get it sooner then the  April appointment day

## 2024-04-09 NOTE — PROGRESS NOTES
0810 Pt admit to [facility] for Vancomycin ambulatory in stable condition. Assessment completed. No new concerns voiced. PICC flushed with positive blood return. Visit Vitals  /87   Pulse 82   Temp 99 °F (37.2 °C)   Resp 18       Medications:  Medications Administered     vancomycin (VANCOCIN) 2,000 mg in 0.9% sodium chloride 500 mL IVPB     Admin Date  08/10/2019 Action  New Bag Dose  2000 mg Rate  250 mL/hr Route  IntraVENous Administered By  Norma Beckwith, RN                  3021 Pt tolerated treatment well. PICC maintained positive blood return throughout treatment, flushed with positive blood return at conclusion and PICC heparinized and curos capes placed on end claves. D/c home ambulatory in no distress. Pt aware of next [facility] appointment scheduled for 8/11/19.
Other

## 2024-04-11 ENCOUNTER — OFFICE VISIT (OUTPATIENT)
Age: 69
End: 2024-04-11
Payer: MEDICARE

## 2024-04-11 VITALS
BODY MASS INDEX: 31.56 KG/M2 | HEART RATE: 70 BPM | SYSTOLIC BLOOD PRESSURE: 127 MMHG | WEIGHT: 225.4 LBS | HEIGHT: 71 IN | RESPIRATION RATE: 20 BRPM | OXYGEN SATURATION: 98 % | TEMPERATURE: 98 F | DIASTOLIC BLOOD PRESSURE: 85 MMHG

## 2024-04-11 DIAGNOSIS — E11.621 DIABETIC ULCER OF BOTH FEET (HCC): ICD-10-CM

## 2024-04-11 DIAGNOSIS — E72.20 DISORDER OF UREA CYCLE METABOLISM, UNSPECIFIED (HCC): ICD-10-CM

## 2024-04-11 DIAGNOSIS — K76.82 HEPATIC ENCEPHALOPATHY (HCC): ICD-10-CM

## 2024-04-11 DIAGNOSIS — I10 PRIMARY HYPERTENSION: Primary | ICD-10-CM

## 2024-04-11 DIAGNOSIS — L97.519 DIABETIC ULCER OF BOTH FEET (HCC): ICD-10-CM

## 2024-04-11 DIAGNOSIS — G47.33 OSA (OBSTRUCTIVE SLEEP APNEA): ICD-10-CM

## 2024-04-11 DIAGNOSIS — L97.529 DIABETIC ULCER OF BOTH FEET (HCC): ICD-10-CM

## 2024-04-11 DIAGNOSIS — E11.51 TYPE 2 DIABETES MELLITUS WITH DIABETIC PERIPHERAL ANGIOPATHY WITHOUT GANGRENE, WITHOUT LONG-TERM CURRENT USE OF INSULIN (HCC): ICD-10-CM

## 2024-04-11 DIAGNOSIS — E78.1 HYPERTRIGLYCERIDEMIA: ICD-10-CM

## 2024-04-11 DIAGNOSIS — E78.00 HYPERCHOLESTEROLEMIA: ICD-10-CM

## 2024-04-11 PROCEDURE — 1036F TOBACCO NON-USER: CPT | Performed by: STUDENT IN AN ORGANIZED HEALTH CARE EDUCATION/TRAINING PROGRAM

## 2024-04-11 PROCEDURE — 99214 OFFICE O/P EST MOD 30 MIN: CPT | Performed by: STUDENT IN AN ORGANIZED HEALTH CARE EDUCATION/TRAINING PROGRAM

## 2024-04-11 PROCEDURE — 2022F DILAT RTA XM EVC RTNOPTHY: CPT | Performed by: STUDENT IN AN ORGANIZED HEALTH CARE EDUCATION/TRAINING PROGRAM

## 2024-04-11 PROCEDURE — G8427 DOCREV CUR MEDS BY ELIG CLIN: HCPCS | Performed by: STUDENT IN AN ORGANIZED HEALTH CARE EDUCATION/TRAINING PROGRAM

## 2024-04-11 PROCEDURE — G8417 CALC BMI ABV UP PARAM F/U: HCPCS | Performed by: STUDENT IN AN ORGANIZED HEALTH CARE EDUCATION/TRAINING PROGRAM

## 2024-04-11 PROCEDURE — 3079F DIAST BP 80-89 MM HG: CPT | Performed by: STUDENT IN AN ORGANIZED HEALTH CARE EDUCATION/TRAINING PROGRAM

## 2024-04-11 PROCEDURE — 3017F COLORECTAL CA SCREEN DOC REV: CPT | Performed by: STUDENT IN AN ORGANIZED HEALTH CARE EDUCATION/TRAINING PROGRAM

## 2024-04-11 PROCEDURE — 1123F ACP DISCUSS/DSCN MKR DOCD: CPT | Performed by: STUDENT IN AN ORGANIZED HEALTH CARE EDUCATION/TRAINING PROGRAM

## 2024-04-11 PROCEDURE — 3051F HG A1C>EQUAL 7.0%<8.0%: CPT | Performed by: STUDENT IN AN ORGANIZED HEALTH CARE EDUCATION/TRAINING PROGRAM

## 2024-04-11 PROCEDURE — 3074F SYST BP LT 130 MM HG: CPT | Performed by: STUDENT IN AN ORGANIZED HEALTH CARE EDUCATION/TRAINING PROGRAM

## 2024-04-11 SDOH — ECONOMIC STABILITY: FOOD INSECURITY: WITHIN THE PAST 12 MONTHS, THE FOOD YOU BOUGHT JUST DIDN'T LAST AND YOU DIDN'T HAVE MONEY TO GET MORE.: NEVER TRUE

## 2024-04-11 SDOH — ECONOMIC STABILITY: INCOME INSECURITY: HOW HARD IS IT FOR YOU TO PAY FOR THE VERY BASICS LIKE FOOD, HOUSING, MEDICAL CARE, AND HEATING?: NOT HARD AT ALL

## 2024-04-11 SDOH — ECONOMIC STABILITY: FOOD INSECURITY: WITHIN THE PAST 12 MONTHS, YOU WORRIED THAT YOUR FOOD WOULD RUN OUT BEFORE YOU GOT MONEY TO BUY MORE.: NEVER TRUE

## 2024-04-11 ASSESSMENT — PATIENT HEALTH QUESTIONNAIRE - PHQ9
SUM OF ALL RESPONSES TO PHQ QUESTIONS 1-9: 0
SUM OF ALL RESPONSES TO PHQ QUESTIONS 1-9: 0
1. LITTLE INTEREST OR PLEASURE IN DOING THINGS: NOT AT ALL
2. FEELING DOWN, DEPRESSED OR HOPELESS: NOT AT ALL
SUM OF ALL RESPONSES TO PHQ9 QUESTIONS 1 & 2: 0
SUM OF ALL RESPONSES TO PHQ QUESTIONS 1-9: 0
SUM OF ALL RESPONSES TO PHQ QUESTIONS 1-9: 0

## 2024-04-11 NOTE — PROGRESS NOTES
HISTORY OF PRESENT ILLNESS   Yohan Lincoln is a 69 y.o. male who  has a past medical history of Chronic tophaceous gout, Diabetes (HCC), and Hypertension.     Pt presents for follow up for dm and hld.    HTN: on lisinopril 40 mg.     DM2: RECALL: Pt refuses metformin because he does not think that it works for him. at last visit discontinued glipizde and increased trulicity to 3.0 mg weekly. A1c 8.3% in March 2023. At 9/12/23 appt reports BG typically running around 110. A1c 7.6% on 1/5/24.   - A1c goal: <7%  - Home glucose checks: Not checking regularly. Refuses CGM but states he will start checking again.  - complications: s/p bilateral toe amputations and current ulcers.   - foot exam: 4/11/24, referred to Dr. Walden today  - ophtho:   - microalbumin: 1/5/24 w proteinuria  - BP goal < 130/80: near goal  - LDL goal < 70 (based on most recent guidelines):   - ACEi/ARB: yes  - SGLT2i:   - statin: refused    BOGDAN: RECALL pt referred to sleep medicine for cpap.      hyperTG:   Dyslipidemia: , HDL 33,  on 3/1/23. After previous discussion, pt refused to start statin. On fenofibrate.  on 1/5/24. Pt admits that he was not taking the fenofibrate prior to that check and still is not taking it. He took it for the first time again this morning and is agreeable to start taking it consistently again.   The 10-year ASCVD risk score (Eastpoint DK, et al., 2019) is: 42.6%    Values used to calculate the score:      Age: 69 years      Sex: Male      Is Non- : No      Diabetic: Yes      Tobacco smoker: No      Systolic Blood Pressure: 127 mmHg      Is BP treated: Yes      HDL Cholesterol: 28 MG/DL      Total Cholesterol: 242 MG/DL    Gout on febuxostat.     HM:  Flu shot:  refused  Covid: refused  PNA: refused  Tdap: refused  Shingles: refused  RSV: refused  Hep C Screen:   HIV screen:  LDCT: NA  AAA: NA  PSA: 1/5/23  Colonoscopy: 11/6/23, repeat 5 yrs  dentist:  MADIHAV:     SOCIAL

## 2024-04-11 NOTE — PROGRESS NOTES
HISTORY OF PRESENT ILLNESS   Yohan Lincoln is a 69 y.o. male who  has a past medical history of Chronic tophaceous gout, Diabetes (HCC), and Hypertension.     Pt presents for follow up for dm and hld.    HTN: on lisinopril 40 mg.     DM2: RECALL: Pt refuses metformin because he does not think that it works for him. at last visit discontinued glipizde and increased trulicity to 3.0 mg weekly. A1c 8.3% in March 2023. At 9/12/23 appt reports BG typically running around 110. A1c 7.6% on 1/5/24.   - A1c goal: <7%  - Home glucose checks: Not checking regularly. Refuses CGM but states he will start checking again.  - foot exam: 4/11/24  - ophtho:   - microalbumin: 1/5/24 w proteinuria  - BP goal < 130/80: near goal  - LDL goal < 70 (based on most recent guidelines):   - ACEi/ARB: yes  - SGLT2i:   - statin: refused    BOGDAN: RECALL pt referred to sleep medicine for cpap.      hyperTG:   Dyslipidemia: , HDL 33,  on 3/1/23. After previous discussion, pt refused to start statin. On fenofibrate.  on 1/5/24. Pt admits that he was not taking the fenofibrate prior to that check and still is not taking it. He took it for the first time again this morning and is agreeable to start taking it consistently again.   The 10-year ASCVD risk score (Olga DK, et al., 2019) is: 46.1%    Values used to calculate the score:      Age: 69 years      Sex: Male      Is Non- : No      Diabetic: Yes      Tobacco smoker: No      Systolic Blood Pressure: 135 mmHg      Is BP treated: Yes      HDL Cholesterol: 28 MG/DL      Total Cholesterol: 242 MG/DL    Gout on febuxostat.     HM:  Flu shot:  refused  Covid: refused  PNA: refused  Tdap: refused  Shingles: refused  RSV: refused  Hep C Screen:   HIV screen:  LDCT: NA  AAA: NA  PSA: 1/5/23  Colonoscopy: 11/6/23, repeat 5 yrs  dentist:  MAWV:     SOCIAL HISTORY:    /85 (Site: Left Upper Arm)   Pulse 70   Temp 98 °F (36.7 °C) (Temporal)   Resp 20   Ht

## 2024-04-11 NOTE — PROGRESS NOTES
\"Have you been to the ER, urgent care clinic since your last visit?  Hospitalized since your last visit?\"    NO    “Have you seen or consulted any other health care providers outside of [facility] since your last visit?”    NO      “Have you had a colorectal cancer screening such as a colonoscopy/FIT/Cologuard?    NO    No colonoscopy on file  No cologuard on file  No FIT/FOBT on file   No flexible sigmoidoscopy on file         Click Here for Release of Records Request

## 2024-04-16 ENCOUNTER — TELEPHONE (OUTPATIENT)
Age: 69
End: 2024-04-16

## 2024-04-16 DIAGNOSIS — E11.51 TYPE 2 DIABETES MELLITUS WITH DIABETIC PERIPHERAL ANGIOPATHY WITHOUT GANGRENE, WITHOUT LONG-TERM CURRENT USE OF INSULIN (HCC): ICD-10-CM

## 2024-04-16 DIAGNOSIS — E78.00 HYPERCHOLESTEROLEMIA: ICD-10-CM

## 2024-04-16 DIAGNOSIS — I10 PRIMARY HYPERTENSION: ICD-10-CM

## 2024-04-16 DIAGNOSIS — E78.1 HYPERTRIGLYCERIDEMIA: ICD-10-CM

## 2024-04-16 DIAGNOSIS — D64.9 ANEMIA, UNSPECIFIED TYPE: ICD-10-CM

## 2024-04-16 LAB
ALBUMIN SERPL-MCNC: 3.7 G/DL (ref 3.5–5)
ALBUMIN/GLOB SERPL: 1 (ref 1.1–2.2)
ALP SERPL-CCNC: 68 U/L (ref 45–117)
ALT SERPL-CCNC: 36 U/L (ref 12–78)
ANION GAP SERPL CALC-SCNC: 6 MMOL/L (ref 5–15)
AST SERPL-CCNC: 30 U/L (ref 15–37)
BASOPHILS # BLD: 0.1 K/UL (ref 0–0.1)
BASOPHILS NFR BLD: 1 % (ref 0–1)
BILIRUB SERPL-MCNC: 0.7 MG/DL (ref 0.2–1)
BUN SERPL-MCNC: 17 MG/DL (ref 6–20)
BUN/CREAT SERPL: 11 (ref 12–20)
CALCIUM SERPL-MCNC: 10.1 MG/DL (ref 8.5–10.1)
CHLORIDE SERPL-SCNC: 108 MMOL/L (ref 97–108)
CHOLEST SERPL-MCNC: 268 MG/DL
CO2 SERPL-SCNC: 26 MMOL/L (ref 21–32)
CREAT SERPL-MCNC: 1.51 MG/DL (ref 0.7–1.3)
DIFFERENTIAL METHOD BLD: ABNORMAL
EOSINOPHIL # BLD: 0.3 K/UL (ref 0–0.4)
EOSINOPHIL NFR BLD: 4 % (ref 0–7)
ERYTHROCYTE [DISTWIDTH] IN BLOOD BY AUTOMATED COUNT: 14.5 % (ref 11.5–14.5)
EST. AVERAGE GLUCOSE BLD GHB EST-MCNC: 226 MG/DL
GLOBULIN SER CALC-MCNC: 3.8 G/DL (ref 2–4)
GLUCOSE SERPL-MCNC: 166 MG/DL (ref 65–100)
HBA1C MFR BLD: 9.5 % (ref 4–5.6)
HCT VFR BLD AUTO: 47.1 % (ref 36.6–50.3)
HDLC SERPL-MCNC: 31 MG/DL
HDLC SERPL: 8.6 (ref 0–5)
HGB BLD-MCNC: 15.2 G/DL (ref 12.1–17)
IMM GRANULOCYTES # BLD AUTO: 0.1 K/UL (ref 0–0.04)
IMM GRANULOCYTES NFR BLD AUTO: 1 % (ref 0–0.5)
LDLC SERPL CALC-MCNC: ABNORMAL MG/DL (ref 0–100)
LDLC SERPL DIRECT ASSAY-MCNC: 98 MG/DL (ref 0–100)
LYMPHOCYTES # BLD: 2 K/UL (ref 0.8–3.5)
LYMPHOCYTES NFR BLD: 20 % (ref 12–49)
MCH RBC QN AUTO: 27.6 PG (ref 26–34)
MCHC RBC AUTO-ENTMCNC: 32.3 G/DL (ref 30–36.5)
MCV RBC AUTO: 85.6 FL (ref 80–99)
MONOCYTES # BLD: 0.9 K/UL (ref 0–1)
MONOCYTES NFR BLD: 9 % (ref 5–13)
NEUTS SEG # BLD: 6.2 K/UL (ref 1.8–8)
NEUTS SEG NFR BLD: 65 % (ref 32–75)
NRBC # BLD: 0 K/UL (ref 0–0.01)
NRBC BLD-RTO: 0 PER 100 WBC
PLATELET # BLD AUTO: 312 K/UL (ref 150–400)
PMV BLD AUTO: 10.8 FL (ref 8.9–12.9)
POTASSIUM SERPL-SCNC: 4.6 MMOL/L (ref 3.5–5.1)
PROT SERPL-MCNC: 7.5 G/DL (ref 6.4–8.2)
RBC # BLD AUTO: 5.5 M/UL (ref 4.1–5.7)
SODIUM SERPL-SCNC: 140 MMOL/L (ref 136–145)
TRIGL SERPL-MCNC: 801 MG/DL
VLDLC SERPL CALC-MCNC: ABNORMAL MG/DL
WBC # BLD AUTO: 9.6 K/UL (ref 4.1–11.1)

## 2024-04-16 NOTE — TELEPHONE ENCOUNTER
Patient is here to check on status of [facility] paperwork that he dropped off on Friday when he was here for orthotics. Please reach out to patient to advise of the status. Thanks!    Call [phone]

## 2024-04-26 ENCOUNTER — TELEPHONE (OUTPATIENT)
Age: 69
End: 2024-04-26

## 2024-04-26 DIAGNOSIS — E11.22 TYPE 2 DIABETES MELLITUS WITH STAGE 3A CHRONIC KIDNEY DISEASE, WITHOUT LONG-TERM CURRENT USE OF INSULIN (HCC): ICD-10-CM

## 2024-04-26 DIAGNOSIS — N18.31 TYPE 2 DIABETES MELLITUS WITH STAGE 3A CHRONIC KIDNEY DISEASE, WITHOUT LONG-TERM CURRENT USE OF INSULIN (HCC): ICD-10-CM

## 2024-04-26 DIAGNOSIS — E11.51 TYPE 2 DIABETES MELLITUS WITH DIABETIC PERIPHERAL ANGIOPATHY WITHOUT GANGRENE, WITHOUT LONG-TERM CURRENT USE OF INSULIN (HCC): ICD-10-CM

## 2024-04-26 NOTE — RESULT ENCOUNTER NOTE
Attempted to reach patient. Left message on vm to return call    Per Dr. Hirsch    Your A1c is worse, now 9.5% which is too high. I placed a referral to endocrinology, Dr. Bhagat. Please call [phone] to set up an appointment. In the meantime please schedule a 6 week follow up with me to discuss more.

## 2024-04-26 NOTE — TELEPHONE ENCOUNTER
PCP: Keven Hirsch MD    Last appt:   4/11/2024    No future appointments.    Requested Prescriptions     Pending Prescriptions Disp Refills    Dulaglutide (TRULICITY) 3 MG/0.5ML SOPN 2 mL 3     Sig: Inject 3 mg into the skin once a week

## 2024-04-26 NOTE — TELEPHONE ENCOUNTER
----- Message from Zuleyma Ambrosio sent at 4/26/2024  3:20 PM EDT -----  Subject: Appointment Request    Reason for Call: Established Patient Appointment needed: Routine Medicare   AWV    QUESTIONS    Reason for appointment request? No appointments available during search     Additional Information for Provider? Patient needs AWV. He is also having   an issue with his foot. He is currently at [facility], admitted for foot issue.   ---------------------------------------------------------------------------  --------------  CALL BACK INFO  [phone]; OK to leave message on voicemail  ---------------------------------------------------------------------------  --------------  SCRIPT ANSWERS

## 2024-04-26 NOTE — RESULT ENCOUNTER NOTE
Your A1c is worse, now 9.5% which is too high. I placed a referral to endocrinology, Dr. Bhagat. Please call [phone] to set up an appointment. In the meantime please schedule a 6 week follow up with me to discuss more.     Your triglycerides are still very elevated as expected, be sure you start taking fenofibrate every day.     Lab work is otherwise relatively stable.

## 2024-04-26 NOTE — TELEPHONE ENCOUNTER
----- Message from Zuleyma Otoole Hebert sent at 4/26/2024  3:21 PM EDT -----  Subject: Refill Request    QUESTIONS  Name of Medication? Dulaglutide (TRULICITY) 3 MG/0.5ML SOPN  Patient-reported dosage and instructions? Inject 3 mg into the skin once a   week  How many days do you have left? 5  Preferred Pharmacy? [facility]  Pharmacy phone number (if available)? [phone]  ---------------------------------------------------------------------------  --------------  CALL BACK INFO  What is the best way for the office to contact you? OK to leave message on   voicemail  Preferred Call Back Phone Number? [phone]  ---------------------------------------------------------------------------  --------------  SCRIPT ANSWERS  Relationship to Patient? Self

## 2024-04-28 RX ORDER — DULAGLUTIDE 3 MG/.5ML
3 INJECTION, SOLUTION SUBCUTANEOUS WEEKLY
Qty: 2 ML | Refills: 3 | Status: SHIPPED | OUTPATIENT
Start: 2024-04-28

## 2024-05-06 ENCOUNTER — TELEPHONE (OUTPATIENT)
Age: 69
End: 2024-05-06

## 2024-05-06 NOTE — TELEPHONE ENCOUNTER
Phoned the patient to schedule a sleep consult per Keven Hirsch MD.  Patient has declined to schedule at this time.  As he is in rehab after having LBKA.

## 2024-05-09 ENCOUNTER — TELEPHONE (OUTPATIENT)
Age: 69
End: 2024-05-09

## 2024-05-09 NOTE — TELEPHONE ENCOUNTER
Pt is discharging on 5-11-24 from [facility].      Please call Samuel to schedule hfu.    She would like a call back today please.

## 2024-05-17 ENCOUNTER — OFFICE VISIT (OUTPATIENT)
Age: 69
End: 2024-05-17
Payer: MEDICARE

## 2024-05-17 VITALS
DIASTOLIC BLOOD PRESSURE: 78 MMHG | OXYGEN SATURATION: 97 % | HEIGHT: 71 IN | BODY MASS INDEX: 31.44 KG/M2 | HEART RATE: 98 BPM | RESPIRATION RATE: 18 BRPM | SYSTOLIC BLOOD PRESSURE: 120 MMHG | TEMPERATURE: 97.5 F

## 2024-05-17 DIAGNOSIS — Z09 HOSPITAL DISCHARGE FOLLOW-UP: Primary | ICD-10-CM

## 2024-05-17 DIAGNOSIS — I10 PRIMARY HYPERTENSION: ICD-10-CM

## 2024-05-17 DIAGNOSIS — Z89.512 STATUS POST BELOW-KNEE AMPUTATION OF LEFT LOWER EXTREMITY (HCC): ICD-10-CM

## 2024-05-17 DIAGNOSIS — E11.22 TYPE 2 DIABETES MELLITUS WITH STAGE 3A CHRONIC KIDNEY DISEASE, WITHOUT LONG-TERM CURRENT USE OF INSULIN (HCC): ICD-10-CM

## 2024-05-17 DIAGNOSIS — N18.31 TYPE 2 DIABETES MELLITUS WITH STAGE 3A CHRONIC KIDNEY DISEASE, WITHOUT LONG-TERM CURRENT USE OF INSULIN (HCC): ICD-10-CM

## 2024-05-17 DIAGNOSIS — E11.51 TYPE 2 DIABETES MELLITUS WITH DIABETIC PERIPHERAL ANGIOPATHY WITHOUT GANGRENE, WITHOUT LONG-TERM CURRENT USE OF INSULIN (HCC): ICD-10-CM

## 2024-05-17 PROCEDURE — 99213 OFFICE O/P EST LOW 20 MIN: CPT | Performed by: STUDENT IN AN ORGANIZED HEALTH CARE EDUCATION/TRAINING PROGRAM

## 2024-05-17 RX ORDER — DULAGLUTIDE 3 MG/.5ML
3 INJECTION, SOLUTION SUBCUTANEOUS WEEKLY
Qty: 2 ML | Refills: 3 | Status: SHIPPED | OUTPATIENT
Start: 2024-05-17

## 2024-05-17 NOTE — PROGRESS NOTES
\"Have you been to the ER, urgent care clinic since your last visit?  Hospitalized since your last visit?\"    Yes, on file     “Have you seen or consulted any other health care providers outside of [facility] since your last visit?”    NO      “Have you had a colorectal cancer screening such as a colonoscopy/FIT/Cologuard?    Yes, [place]       No colonoscopy on file  No cologuard on file  No FIT/FOBT on file   No flexible sigmoidoscopy on file         Click Here for Release of Records Request

## 2024-06-24 ENCOUNTER — TELEPHONE (OUTPATIENT)
Age: 69
End: 2024-06-24

## 2024-06-24 NOTE — TELEPHONE ENCOUNTER
S/w patient, he has Medicare and needs a name of a podiatrist you would recommend. States he does not need to contact insurance company.  Notified pt I would call him back with a podiatrist.    Please advise.

## 2024-06-24 NOTE — TELEPHONE ENCOUNTER
Pt wife asking for recommendation of a podiatrist    Psr suggested to first contact insurance and request who is in network.    If dr referral still needed they can let pcp know which podiatrists are in network so that pcp has better knowledge of who to refer to.    Caller states understanding and states will contact insurance.

## 2024-06-28 DIAGNOSIS — E11.51 TYPE 2 DIABETES MELLITUS WITH DIABETIC PERIPHERAL ANGIOPATHY WITHOUT GANGRENE, WITHOUT LONG-TERM CURRENT USE OF INSULIN (HCC): ICD-10-CM

## 2024-06-28 DIAGNOSIS — E11.22 TYPE 2 DIABETES MELLITUS WITH STAGE 3A CHRONIC KIDNEY DISEASE, WITHOUT LONG-TERM CURRENT USE OF INSULIN (HCC): ICD-10-CM

## 2024-06-28 DIAGNOSIS — N18.31 TYPE 2 DIABETES MELLITUS WITH STAGE 3A CHRONIC KIDNEY DISEASE, WITHOUT LONG-TERM CURRENT USE OF INSULIN (HCC): ICD-10-CM

## 2024-06-28 RX ORDER — DULAGLUTIDE 3 MG/.5ML
3 INJECTION, SOLUTION SUBCUTANEOUS WEEKLY
Qty: 2 ML | Refills: 3 | Status: SHIPPED | OUTPATIENT
Start: 2024-06-28

## 2024-06-28 NOTE — TELEPHONE ENCOUNTER
Caller requests Refill of:  Dulaglutide (TRULICITY) 3 MG/0.5ML SOPN       Please send to:    [facility] - [place] - [address] - P [phone] - F [phone]  [address]  [address]  [facility] [place] [address]  Phone: [phone] Fax: [phone]         Visit / Appointment History:  Future Appointment at [facility]:  Visit date not found   Last Appointment With PCP:  5/17/2024       Caller confirmed instructions and dosages as correct.    Caller was advised that Meds will be refilled as soon as possible, however there can be a 48-72 business hour turn around on refill requests.

## 2024-09-25 NOTE — PROGRESS NOTES
HISTORY OF PRESENT ILLNESS   Yohan Lincoln is a 69 y.o. male who  has a past medical history of Chronic tophaceous gout, Diabetes (HCC), and Hypertension.     Pt presents for follow up for hospital follow up.     Per dc summary:     69 YOM w/ HTN, CKD3, DM, and multiple prior admissions for infected diabetic foot ulcers and osteonecrosis s/p amputation of bilateral 5th toes presents with left foot cellulitis/OM now s/p BKA.    Sepsis 2/2 osteomyelitis of left foot s/p BKA: Has diabetic foot ulcer with purulent drainage on the plantar aspect of the left foot. ABIs normal bilaterally, LE arterial duplex showed no significant stenosis. Xray showed ulceration on plantar aspect of left foot, no evidence of osteomyelitis. CT showed concern for osteomyelitis, no abscess. MRI done on 4/28 with abscesses and acute osteo of the distal fourth metatarsal and proximal phalanx of the fourth toe, septic joint. Blood cultures were negative. Wound debridement at bedside by ortho revealed probing to bone. Underwent BKA on 4/30, completed 48+ hours of post-operative antibiotics with vancomycin/cefepime.   - [facility]   - Pain control w/ Tylenol PRN for mild pain, oxycodone 5 - 10 mg PRN for mod - severe pain    DM2: Hgb A1c 9.1%. Takes trulicity at home. Discussed recommendations for transition to insulin for improved BG management, patient will consider in the future and agreeable to continue during rehab.  - Holding home Trulicity  - Continue lantus 24 units nightly, Lispro 6u TID with meals  - BG checks, correctional insulin, hypoglycemic protocol    HTN  - continue lisinopril    Gout  - continue home febuxostat    HLD: Lipid panel 4/25 showed , LDL 91, chol 176, HDL 19. Not on statin as outpatient, pt does not want to start this.  - Continue fenofibrate, started on atorvastatin 20 mg daily     CKD stage 3: Cr remained in baseline range.     Since dc from 5/11/24 he has cut  [Large Joint Injection] : Large joint injection [Bilateral] : bilaterally of the [Greater Trochanteric Bursa] : greater trochanteric bursa [Pain] : pain [Alcohol] : alcohol [Betadine] : betadine [Ethyl Chloride sprayed topically] : ethyl chloride sprayed topically [Sterile technique used] : sterile technique used [___ cc    1%] : Lidocaine ~Vcc of 1%  [___ cc    0.25%] : Bupivacaine (Marcaine) ~Vcc of 0.25%  [___ cc    10mg] : Triamcinolone (Kenalog) ~Vcc of 10 mg  [] : Patient tolerated procedure well [Call if redness, pain or fever occur] : call if redness, pain or fever occur [Previous OTC use and PT nontherapeutic] : patient has tried OTC's including aspirin, Ibuprofen, Aleve, etc or prescription NSAIDS, and/or exercises at home and/or physical therapy without satisfactory response [Patient had decreased mobility in the joint] : patient had decreased mobility in the joint [Risks, benefits, alternatives discussed / Verbal consent obtained] : the risks benefits, and alternatives have been discussed, and verbal consent was obtained

## 2024-10-28 ENCOUNTER — TELEPHONE (OUTPATIENT)
Age: 69
End: 2024-10-28

## 2024-10-28 NOTE — TELEPHONE ENCOUNTER
Patient states he needs a call back to discuss Plan of care for spot on Amputation Area of Concern from his Amputation that was in April. Please call to discuss. Thank you

## 2024-10-29 NOTE — TELEPHONE ENCOUNTER
S/w patient, small needlepoint area on amputated leg noticed by physical therapist today. The area is not swelling or red, states a small amount of drainage. Scheduled patient to be seen, stated the surgeon first available is Dec.

## 2024-11-04 ENCOUNTER — OFFICE VISIT (OUTPATIENT)
Age: 69
End: 2024-11-04
Payer: MEDICARE

## 2024-11-04 VITALS
WEIGHT: 217 LBS | HEART RATE: 102 BPM | OXYGEN SATURATION: 94 % | DIASTOLIC BLOOD PRESSURE: 72 MMHG | SYSTOLIC BLOOD PRESSURE: 111 MMHG | RESPIRATION RATE: 20 BRPM | TEMPERATURE: 98.1 F | HEIGHT: 71 IN | BODY MASS INDEX: 30.38 KG/M2

## 2024-11-04 DIAGNOSIS — E78.1 HYPERTRIGLYCERIDEMIA: ICD-10-CM

## 2024-11-04 DIAGNOSIS — T87.89 ULCER OF AMPUTATION STUMP OF FOOT (HCC): ICD-10-CM

## 2024-11-04 DIAGNOSIS — L97.509 ULCER OF AMPUTATION STUMP OF FOOT (HCC): ICD-10-CM

## 2024-11-04 DIAGNOSIS — E11.22 TYPE 2 DIABETES MELLITUS WITH STAGE 3A CHRONIC KIDNEY DISEASE, WITHOUT LONG-TERM CURRENT USE OF INSULIN (HCC): Primary | ICD-10-CM

## 2024-11-04 DIAGNOSIS — I10 PRIMARY HYPERTENSION: ICD-10-CM

## 2024-11-04 DIAGNOSIS — N18.31 TYPE 2 DIABETES MELLITUS WITH STAGE 3A CHRONIC KIDNEY DISEASE, WITHOUT LONG-TERM CURRENT USE OF INSULIN (HCC): Primary | ICD-10-CM

## 2024-11-04 LAB — HBA1C MFR BLD: 8.4 %

## 2024-11-04 PROCEDURE — 83036 HEMOGLOBIN GLYCOSYLATED A1C: CPT | Performed by: STUDENT IN AN ORGANIZED HEALTH CARE EDUCATION/TRAINING PROGRAM

## 2024-11-04 PROCEDURE — 99214 OFFICE O/P EST MOD 30 MIN: CPT | Performed by: STUDENT IN AN ORGANIZED HEALTH CARE EDUCATION/TRAINING PROGRAM

## 2024-11-04 RX ORDER — DULAGLUTIDE 4.5 MG/.5ML
4.5 INJECTION, SOLUTION SUBCUTANEOUS
Qty: 2 ML | Refills: 3 | Status: SHIPPED | OUTPATIENT
Start: 2024-11-04

## 2024-11-04 NOTE — PROGRESS NOTES
\"Have you been to the ER, urgent care clinic since your last visit?  Hospitalized since your last visit?\"    NO    “Have you seen or consulted any other health care providers outside our system since your last visit?”    [facility]// Dr. Restrepo// orthopedic surgeon   [facility]// [facility]  Dr. Walden// Podiatry    “Have you had a colorectal cancer screening such as a colonoscopy/FIT/Cologuard?    Approx early 2024     No colonoscopy on file  No cologuard on file  No FIT/FOBT on file   No flexible sigmoidoscopy on file     “Have you had a diabetic eye exam?”    ?    No diabetic eye exam on file

## 2024-11-04 NOTE — PROGRESS NOTES
HISTORY OF PRESENT ILLNESS   Yohan Lincoln is a 69 y.o. male who  has a past medical history of Chronic tophaceous gout, Diabetes (HCC), and Hypertension.     Pt presents for wound on amputated leg.     He is overdue for follow up since 6/2024.       DM2:   Care has been limited by patient's cooperation, not check FS BG and refuses CGM, refuses insulin, poor follow up.   S/p multiple amputations, most recently L BKA w Dr. Restrepo.   Last seen by Dr. Restrepo 10/17/24 for post op at which time he had punctate wound and small suture/stitch abscess which was drained.   Refuses metformin,   3/2023 A1c 8.3%, 1/5/24 A1c 7.6%, 4/16/24 A1c 9.5  He is drinking a lot orange juice, he is drinking occasional Dr. Pepper.       HTN: on lisinopril 40 mg.     Severe hyperTG:   Dyslipidemia: , HDL 33,  on 3/1/23.   Refuses statin and limited compliance with fenofibrate.   The 10-year ASCVD risk score (Olga VÁZQUEZ, et al., 2019) is: 39.7%    Values used to calculate the score:      Age: 69 years      Sex: Male      Is Non- : No      Diabetic: Yes      Tobacco smoker: No      Systolic Blood Pressure: 120 mmHg      Is BP treated: Yes      HDL Cholesterol: 31 MG/DL      Total Cholesterol: 268 MG/DL    BOGDAN: RECALL pt referred to sleep medicine for cpap.     Gout on febuxostat.     HM:  Flu shot:  refused  Covid: refused  PNA: refused  Tdap: refused  Shingles: refused  RSV: refused  Hep C Screen:   HIV screen:  LDCT: NA  AAA: NA  PSA: 1/5/23  Colonoscopy: 11/6/23, repeat 5 yrs  dentist:  MADIHAV:     SOCIAL HISTORY:    /72 (Site: Left Upper Arm, Position: Sitting, Cuff Size: Large Adult)   Pulse (!) 102   Temp 98.1 °F (36.7 °C) (Temporal)   Resp 20   Ht 1.803 m (5' 10.98\")   Wt 98.4 kg (217 lb)   SpO2 94%   BMI 30.28 kg/m²       Physical Exam  Constitutional:       General: He is not in acute distress.     Appearance: Normal appearance. He is not toxic-appearing.   HENT:      Head:

## 2025-05-28 DIAGNOSIS — E11.22 TYPE 2 DIABETES MELLITUS WITH STAGE 3A CHRONIC KIDNEY DISEASE, WITHOUT LONG-TERM CURRENT USE OF INSULIN (HCC): ICD-10-CM

## 2025-05-28 DIAGNOSIS — E78.1 HYPERTRIGLYCERIDEMIA: ICD-10-CM

## 2025-05-28 DIAGNOSIS — N18.31 TYPE 2 DIABETES MELLITUS WITH STAGE 3A CHRONIC KIDNEY DISEASE, WITHOUT LONG-TERM CURRENT USE OF INSULIN (HCC): ICD-10-CM

## 2025-05-28 DIAGNOSIS — I10 HYPERTENSION, UNSPECIFIED TYPE: ICD-10-CM

## 2025-05-28 RX ORDER — LISINOPRIL 40 MG/1
40 TABLET ORAL DAILY
Qty: 90 TABLET | Refills: 1 | Status: SHIPPED | OUTPATIENT
Start: 2025-05-28

## 2025-05-28 RX ORDER — DULAGLUTIDE 4.5 MG/.5ML
4.5 INJECTION, SOLUTION SUBCUTANEOUS
Qty: 6 ML | Refills: 1 | Status: SHIPPED | OUTPATIENT
Start: 2025-05-28

## 2025-05-28 RX ORDER — FENOFIBRATE 160 MG/1
TABLET ORAL
Qty: 90 TABLET | Refills: 1 | Status: SHIPPED | OUTPATIENT
Start: 2025-05-28

## 2025-05-28 NOTE — TELEPHONE ENCOUNTER
PCP: Keven Hirsch MD    Last appt: 11/4/2024  Future Appointments   Date Time Provider Department Center   7/7/2025 11:00 AM Keven Hirsch MD [facility] DEP       Requested Prescriptions     Pending Prescriptions Disp Refills    lisinopril (PRINIVIL;ZESTRIL) 40 MG tablet 90 tablet 1     Sig: Take 1 tablet by mouth daily    fenofibrate 160 MG tablet 90 tablet 1     Sig: TAKE 1 TABLET BY MOUTH EVERY DAY. One time fill from hospital. Future fills and followups by patient's outpatient provider.    empagliflozin (JARDIANCE) 10 MG tablet 90 tablet 1     Sig: Take 1 tablet by mouth daily    Dulaglutide (TRULICITY) 4.5 MG/0.5ML SOAJ 6 mL 1     Sig: Inject 4.5 mg into the skin every 7 days

## 2025-05-28 NOTE — TELEPHONE ENCOUNTER
Patient presents to the office to drop off [facility]  form for Keven Hirsch MD. Patient advised of potential 10-14 business day turnaround time for form completion & may incur a fee of $25.00. This has been fully explained to the patient, who indicates understanding.

## 2025-05-28 NOTE — TELEPHONE ENCOUNTER
Caller requests Refill of:  lisinopril (PRINIVIL;ZESTRIL) 40 MG tablet     empagliflozin (JARDIANCE) 10 MG tablet     fenofibrate (TRIGLIDE) 160 MG tablet     Dulaglutide (TRULICITY) 4.5 MG/0.5ML SOAJ     Please send to:    [facility] - [place], [place] - [address]  -  [phone] - F [phone]  [address] [facility] [address]  [facility] [address]  Phone: [phone] Fax: [phone]         Visit / Appointment History:  Future Appointment at [facility]:  Visit date not found   Last Appointment With PCP:  11/4/2024       Caller confirmed instructions and dosages as correct.    Caller was advised that Meds will be refilled as soon as possible, however there can be a 48-72 business hour turn around on refill requests.   None known

## 2025-06-30 ENCOUNTER — TELEPHONE (OUTPATIENT)
Age: 70
End: 2025-06-30

## 2025-06-30 NOTE — TELEPHONE ENCOUNTER
Patient is wondering if [facility] paperwork was signed and faxed back to [facility] Patient also states needs medications refilled please call patient to discuss [phone].

## 2025-07-01 NOTE — TELEPHONE ENCOUNTER
Updated forms for [facility] placed in Dr. Hirsch's inbox for signature.

## 2025-07-03 ENCOUNTER — TELEPHONE (OUTPATIENT)
Age: 70
End: 2025-07-03

## 2025-07-03 NOTE — TELEPHONE ENCOUNTER
Patient states he has an appointment Monday with Dr Hirsch, but is requesting a few things prior. Patient was following up regarding the prescriptions.. have they been sent in? Patient states that [facility] Form still has not been sent to [facility]. I checked patient's chart and a blank copy was scanned in 5/28; however, I do not see where a signed and faxed copy has been scanned in. Has this been faxed to [facility]? Please call patient to discuss.   25.3

## 2025-07-07 SDOH — HEALTH STABILITY: PHYSICAL HEALTH
ON AVERAGE, HOW MANY DAYS PER WEEK DO YOU ENGAGE IN MODERATE TO STRENUOUS EXERCISE (LIKE A BRISK WALK)?: PATIENT DECLINED

## 2025-07-07 SDOH — ECONOMIC STABILITY: TRANSPORTATION INSECURITY
IN THE PAST 12 MONTHS, HAS THE LACK OF TRANSPORTATION KEPT YOU FROM MEDICAL APPOINTMENTS OR FROM GETTING MEDICATIONS?: PATIENT DECLINED

## 2025-07-07 SDOH — ECONOMIC STABILITY: INCOME INSECURITY: IN THE LAST 12 MONTHS, WAS THERE A TIME WHEN YOU WERE NOT ABLE TO PAY THE MORTGAGE OR RENT ON TIME?: PATIENT DECLINED

## 2025-07-07 SDOH — ECONOMIC STABILITY: FOOD INSECURITY: WITHIN THE PAST 12 MONTHS, THE FOOD YOU BOUGHT JUST DIDN'T LAST AND YOU DIDN'T HAVE MONEY TO GET MORE.: PATIENT DECLINED

## 2025-07-07 SDOH — HEALTH STABILITY: PHYSICAL HEALTH: ON AVERAGE, HOW MANY MINUTES DO YOU ENGAGE IN EXERCISE AT THIS LEVEL?: 30 MIN

## 2025-07-07 SDOH — ECONOMIC STABILITY: TRANSPORTATION INSECURITY
IN THE PAST 12 MONTHS, HAS LACK OF TRANSPORTATION KEPT YOU FROM MEETINGS, WORK, OR FROM GETTING THINGS NEEDED FOR DAILY LIVING?: PATIENT DECLINED

## 2025-07-07 SDOH — ECONOMIC STABILITY: FOOD INSECURITY: WITHIN THE PAST 12 MONTHS, YOU WORRIED THAT YOUR FOOD WOULD RUN OUT BEFORE YOU GOT MONEY TO BUY MORE.: PATIENT DECLINED

## 2025-07-07 ASSESSMENT — PATIENT HEALTH QUESTIONNAIRE - PHQ9
1. LITTLE INTEREST OR PLEASURE IN DOING THINGS: NOT AT ALL
SUM OF ALL RESPONSES TO PHQ QUESTIONS 1-9: 0
2. FEELING DOWN, DEPRESSED OR HOPELESS: NOT AT ALL
SUM OF ALL RESPONSES TO PHQ QUESTIONS 1-9: 0

## 2025-07-07 ASSESSMENT — LIFESTYLE VARIABLES
HOW MANY STANDARD DRINKS CONTAINING ALCOHOL DO YOU HAVE ON A TYPICAL DAY: 0
HOW MANY STANDARD DRINKS CONTAINING ALCOHOL DO YOU HAVE ON A TYPICAL DAY: PATIENT DOES NOT DRINK
HOW OFTEN DO YOU HAVE SIX OR MORE DRINKS ON ONE OCCASION: 1
HOW OFTEN DO YOU HAVE A DRINK CONTAINING ALCOHOL: NEVER
HOW OFTEN DO YOU HAVE A DRINK CONTAINING ALCOHOL: 1

## 2025-07-07 NOTE — TELEPHONE ENCOUNTER
S/w pt notified forms were faxed and confirmation received. Pt rescheduled appt.   Received two pt identifiers

## 2025-07-07 NOTE — TELEPHONE ENCOUNTER
Pt states would like an update on [facility] clinic forms.    Pt states if doesn't hear anything about forms today, has an appt with [facility] clinic tomorrow and will be in office tomorrow morning to get forms.     Please call to discuss.

## 2025-07-24 ENCOUNTER — TELEPHONE (OUTPATIENT)
Age: 70
End: 2025-07-24

## 2025-07-24 NOTE — TELEPHONE ENCOUNTER
An order was received for dm shoes and dm foot statement has date of 2024 the bottom  is not signed or dated. Missing med notes from last dm treatment visit.      Fax #[phone]

## 2025-08-06 ENCOUNTER — OFFICE VISIT (OUTPATIENT)
Age: 70
End: 2025-08-06
Payer: MEDICARE

## 2025-08-06 VITALS
RESPIRATION RATE: 20 BRPM | OXYGEN SATURATION: 100 % | SYSTOLIC BLOOD PRESSURE: 110 MMHG | TEMPERATURE: 97.2 F | DIASTOLIC BLOOD PRESSURE: 76 MMHG | WEIGHT: 210 LBS | HEIGHT: 71 IN | HEART RATE: 88 BPM | BODY MASS INDEX: 29.4 KG/M2

## 2025-08-06 DIAGNOSIS — I10 PRIMARY HYPERTENSION: ICD-10-CM

## 2025-08-06 DIAGNOSIS — Z00.00 MEDICARE ANNUAL WELLNESS VISIT, SUBSEQUENT: ICD-10-CM

## 2025-08-06 DIAGNOSIS — L97.509 ULCER OF AMPUTATION STUMP OF FOOT (HCC): ICD-10-CM

## 2025-08-06 DIAGNOSIS — E11.51 TYPE 2 DIABETES MELLITUS WITH DIABETIC PERIPHERAL ANGIOPATHY WITHOUT GANGRENE, WITHOUT LONG-TERM CURRENT USE OF INSULIN (HCC): Primary | ICD-10-CM

## 2025-08-06 DIAGNOSIS — T87.89 ULCER OF AMPUTATION STUMP OF FOOT (HCC): ICD-10-CM

## 2025-08-06 DIAGNOSIS — N18.31 TYPE 2 DIABETES MELLITUS WITH STAGE 3A CHRONIC KIDNEY DISEASE, WITHOUT LONG-TERM CURRENT USE OF INSULIN (HCC): ICD-10-CM

## 2025-08-06 DIAGNOSIS — E78.1 HYPERTRIGLYCERIDEMIA: ICD-10-CM

## 2025-08-06 DIAGNOSIS — E11.22 TYPE 2 DIABETES MELLITUS WITH STAGE 3A CHRONIC KIDNEY DISEASE, WITHOUT LONG-TERM CURRENT USE OF INSULIN (HCC): ICD-10-CM

## 2025-08-06 PROCEDURE — 3074F SYST BP LT 130 MM HG: CPT | Performed by: STUDENT IN AN ORGANIZED HEALTH CARE EDUCATION/TRAINING PROGRAM

## 2025-08-06 PROCEDURE — G8427 DOCREV CUR MEDS BY ELIG CLIN: HCPCS | Performed by: STUDENT IN AN ORGANIZED HEALTH CARE EDUCATION/TRAINING PROGRAM

## 2025-08-06 PROCEDURE — 3017F COLORECTAL CA SCREEN DOC REV: CPT | Performed by: STUDENT IN AN ORGANIZED HEALTH CARE EDUCATION/TRAINING PROGRAM

## 2025-08-06 PROCEDURE — 1036F TOBACCO NON-USER: CPT | Performed by: STUDENT IN AN ORGANIZED HEALTH CARE EDUCATION/TRAINING PROGRAM

## 2025-08-06 PROCEDURE — 1123F ACP DISCUSS/DSCN MKR DOCD: CPT | Performed by: STUDENT IN AN ORGANIZED HEALTH CARE EDUCATION/TRAINING PROGRAM

## 2025-08-06 PROCEDURE — 2022F DILAT RTA XM EVC RTNOPTHY: CPT | Performed by: STUDENT IN AN ORGANIZED HEALTH CARE EDUCATION/TRAINING PROGRAM

## 2025-08-06 PROCEDURE — 99214 OFFICE O/P EST MOD 30 MIN: CPT | Performed by: STUDENT IN AN ORGANIZED HEALTH CARE EDUCATION/TRAINING PROGRAM

## 2025-08-06 PROCEDURE — G0439 PPPS, SUBSEQ VISIT: HCPCS | Performed by: STUDENT IN AN ORGANIZED HEALTH CARE EDUCATION/TRAINING PROGRAM

## 2025-08-06 PROCEDURE — G8417 CALC BMI ABV UP PARAM F/U: HCPCS | Performed by: STUDENT IN AN ORGANIZED HEALTH CARE EDUCATION/TRAINING PROGRAM

## 2025-08-06 PROCEDURE — 3046F HEMOGLOBIN A1C LEVEL >9.0%: CPT | Performed by: STUDENT IN AN ORGANIZED HEALTH CARE EDUCATION/TRAINING PROGRAM

## 2025-08-06 PROCEDURE — 3078F DIAST BP <80 MM HG: CPT | Performed by: STUDENT IN AN ORGANIZED HEALTH CARE EDUCATION/TRAINING PROGRAM

## 2025-08-06 PROCEDURE — 1159F MED LIST DOCD IN RCRD: CPT | Performed by: STUDENT IN AN ORGANIZED HEALTH CARE EDUCATION/TRAINING PROGRAM
